# Patient Record
Sex: MALE | Race: WHITE | Employment: FULL TIME | URBAN - METROPOLITAN AREA
[De-identification: names, ages, dates, MRNs, and addresses within clinical notes are randomized per-mention and may not be internally consistent; named-entity substitution may affect disease eponyms.]

---

## 2017-01-06 ENCOUNTER — HOSPITAL ENCOUNTER (OUTPATIENT)
Dept: CV DIAGNOSTICS | Facility: HOSPITAL | Age: 52
Discharge: HOME | End: 2017-01-06
Attending: INTERNAL MEDICINE
Payer: COMMERCIAL

## 2017-01-06 DIAGNOSIS — R07.2 PRECORDIAL CHEST PAIN: ICD-10-CM

## 2017-01-06 DIAGNOSIS — I71.2 ASCENDING AORTIC ANEURYSM (HCC): ICD-10-CM

## 2017-01-06 PROCEDURE — 93306 TTE W/DOPPLER COMPLETE: CPT

## 2017-01-06 PROCEDURE — 93306 TTE W/DOPPLER COMPLETE: CPT | Performed by: INTERNAL MEDICINE

## 2017-01-27 ENCOUNTER — TELEPHONE (OUTPATIENT)
Dept: FAMILY MEDICINE CLINIC | Facility: CLINIC | Age: 52
End: 2017-01-27

## 2017-01-27 DIAGNOSIS — E78.5 HYPERLIPIDEMIA, UNSPECIFIED HYPERLIPIDEMIA TYPE: ICD-10-CM

## 2017-01-27 DIAGNOSIS — E10.8 TYPE I DIABETES MELLITUS WITH MANIFESTATIONS (HCC): ICD-10-CM

## 2017-01-27 DIAGNOSIS — Z13.29 SCREENING FOR THYROID DISORDER: ICD-10-CM

## 2017-01-27 DIAGNOSIS — Z13.0 SCREENING FOR DEFICIENCY ANEMIA: Primary | ICD-10-CM

## 2017-01-27 NOTE — TELEPHONE ENCOUNTER
patient asking for lab orders. has labs done every 4 months. please call when in the system      Please see attached message   Patient last had labs done October 2016

## 2017-01-31 ENCOUNTER — LAB ENCOUNTER (OUTPATIENT)
Dept: LAB | Age: 52
End: 2017-01-31
Attending: FAMILY MEDICINE
Payer: COMMERCIAL

## 2017-01-31 DIAGNOSIS — E78.5 HYPERLIPIDEMIA, UNSPECIFIED HYPERLIPIDEMIA TYPE: ICD-10-CM

## 2017-01-31 DIAGNOSIS — E10.8 TYPE I DIABETES MELLITUS WITH MANIFESTATIONS (HCC): ICD-10-CM

## 2017-01-31 DIAGNOSIS — Z13.29 SCREENING FOR THYROID DISORDER: ICD-10-CM

## 2017-01-31 DIAGNOSIS — Z13.0 SCREENING FOR DEFICIENCY ANEMIA: ICD-10-CM

## 2017-01-31 LAB
ALBUMIN SERPL-MCNC: 4.5 G/DL (ref 3.5–4.8)
ALP LIVER SERPL-CCNC: 66 U/L (ref 45–117)
ALT SERPL-CCNC: 52 U/L (ref 17–63)
AST SERPL-CCNC: 24 U/L (ref 15–41)
BASOPHILS # BLD AUTO: 0.07 X10(3) UL (ref 0–0.1)
BASOPHILS NFR BLD AUTO: 1.1 %
BILIRUB SERPL-MCNC: 0.8 MG/DL (ref 0.1–2)
BUN BLD-MCNC: 18 MG/DL (ref 8–20)
CALCIUM BLD-MCNC: 9.6 MG/DL (ref 8.3–10.3)
CHLORIDE: 104 MMOL/L (ref 101–111)
CHOLEST SMN-MCNC: 188 MG/DL (ref ?–200)
CO2: 30 MMOL/L (ref 22–32)
CREAT BLD-MCNC: 1.1 MG/DL (ref 0.7–1.3)
EOSINOPHIL # BLD AUTO: 0.28 X10(3) UL (ref 0–0.3)
EOSINOPHIL NFR BLD AUTO: 4.3 %
ERYTHROCYTE [DISTWIDTH] IN BLOOD BY AUTOMATED COUNT: 13 % (ref 11.5–16)
EST. AVERAGE GLUCOSE BLD GHB EST-MCNC: 174 MG/DL (ref 68–126)
GLUCOSE BLD-MCNC: 92 MG/DL (ref 70–99)
HBA1C MFR BLD HPLC: 7.7 % (ref ?–5.7)
HCT VFR BLD AUTO: 46.2 % (ref 37–53)
HDLC SERPL-MCNC: 56 MG/DL (ref 45–?)
HDLC SERPL: 3.36 {RATIO} (ref ?–4.97)
HGB BLD-MCNC: 15.5 G/DL (ref 13–17)
IMMATURE GRANULOCYTE COUNT: 0.01 X10(3) UL (ref 0–1)
IMMATURE GRANULOCYTE RATIO %: 0.2 %
LDLC SERPL CALC-MCNC: 115 MG/DL (ref ?–130)
LYMPHOCYTES # BLD AUTO: 2.03 X10(3) UL (ref 0.9–4)
LYMPHOCYTES NFR BLD AUTO: 31 %
M PROTEIN MFR SERPL ELPH: 7.8 G/DL (ref 6.1–8.3)
MCH RBC QN AUTO: 30.8 PG (ref 27–33.2)
MCHC RBC AUTO-ENTMCNC: 33.5 G/DL (ref 31–37)
MCV RBC AUTO: 91.7 FL (ref 80–99)
MONOCYTES # BLD AUTO: 0.51 X10(3) UL (ref 0.1–0.6)
MONOCYTES NFR BLD AUTO: 7.8 %
NEUTROPHIL ABS PRELIM: 3.65 X10 (3) UL (ref 1.3–6.7)
NEUTROPHILS # BLD AUTO: 3.65 X10(3) UL (ref 1.3–6.7)
NEUTROPHILS NFR BLD AUTO: 55.6 %
NONHDLC SERPL-MCNC: 132 MG/DL (ref ?–130)
PLATELET # BLD AUTO: 281 10(3)UL (ref 150–450)
POTASSIUM SERPL-SCNC: 4.1 MMOL/L (ref 3.6–5.1)
RBC # BLD AUTO: 5.04 X10(6)UL (ref 4.3–5.7)
RED CELL DISTRIBUTION WIDTH-SD: 43.2 FL (ref 35.1–46.3)
SODIUM SERPL-SCNC: 140 MMOL/L (ref 136–144)
TRIGLYCERIDES: 85 MG/DL (ref ?–150)
TSI SER-ACNC: 1.07 MIU/ML (ref 0.35–5.5)
VLDL: 17 MG/DL (ref 5–40)
WBC # BLD AUTO: 6.6 X10(3) UL (ref 4–13)

## 2017-01-31 PROCEDURE — 83036 HEMOGLOBIN GLYCOSYLATED A1C: CPT

## 2017-01-31 PROCEDURE — 36415 COLL VENOUS BLD VENIPUNCTURE: CPT

## 2017-01-31 PROCEDURE — 80061 LIPID PANEL: CPT

## 2017-01-31 PROCEDURE — 80053 COMPREHEN METABOLIC PANEL: CPT

## 2017-01-31 PROCEDURE — 84443 ASSAY THYROID STIM HORMONE: CPT

## 2017-01-31 PROCEDURE — 85025 COMPLETE CBC W/AUTO DIFF WBC: CPT

## 2017-02-03 ENCOUNTER — OFFICE VISIT (OUTPATIENT)
Dept: FAMILY MEDICINE CLINIC | Facility: CLINIC | Age: 52
End: 2017-02-03

## 2017-02-03 VITALS
TEMPERATURE: 97 F | HEART RATE: 91 BPM | SYSTOLIC BLOOD PRESSURE: 120 MMHG | WEIGHT: 190.19 LBS | OXYGEN SATURATION: 99 % | RESPIRATION RATE: 16 BRPM | BODY MASS INDEX: 26.33 KG/M2 | DIASTOLIC BLOOD PRESSURE: 78 MMHG | HEIGHT: 71.25 IN

## 2017-02-03 DIAGNOSIS — I77.810 THORACIC AORTIC ECTASIA (HCC): Primary | ICD-10-CM

## 2017-02-03 DIAGNOSIS — E29.1 HYPOGONADISM IN MALE: ICD-10-CM

## 2017-02-03 DIAGNOSIS — E10.8 TYPE 1 DIABETES MELLITUS WITH COMPLICATION (HCC): ICD-10-CM

## 2017-02-03 PROCEDURE — 99213 OFFICE O/P EST LOW 20 MIN: CPT | Performed by: FAMILY MEDICINE

## 2017-02-03 NOTE — PROGRESS NOTES
Here for 2 reasons one is to follow his chronic light nearly lifelong type 1 diabetes. Most recent A1c is 7.7. Second reason he is here is because of evolving sensation in his substernal area especially when lying backwards abruptly.   There is no cough

## 2017-02-06 ENCOUNTER — APPOINTMENT (OUTPATIENT)
Dept: LAB | Age: 52
End: 2017-02-06
Attending: FAMILY MEDICINE
Payer: COMMERCIAL

## 2017-02-06 DIAGNOSIS — E29.1 HYPOGONADISM IN MALE: ICD-10-CM

## 2017-02-06 LAB
FSH: 7.3 MIU/ML (ref 1.4–18.1)
LH: 4.1 MIU/ML (ref 1.5–9.3)
PROLACTIN: 7.8 NG/ML (ref 2.1–17.7)

## 2017-02-06 PROCEDURE — 84146 ASSAY OF PROLACTIN: CPT

## 2017-02-06 PROCEDURE — 36415 COLL VENOUS BLD VENIPUNCTURE: CPT

## 2017-02-06 PROCEDURE — 84403 ASSAY OF TOTAL TESTOSTERONE: CPT

## 2017-02-06 PROCEDURE — 83002 ASSAY OF GONADOTROPIN (LH): CPT

## 2017-02-06 PROCEDURE — 84402 ASSAY OF FREE TESTOSTERONE: CPT

## 2017-02-06 PROCEDURE — 83001 ASSAY OF GONADOTROPIN (FSH): CPT

## 2017-02-09 LAB
TESTOSTERONE TOTAL: 461 NG/DL
TESTOSTERONE, FREE -MS/MS: 95.9 PG/ML

## 2017-03-22 RX ORDER — INSULIN DETEMIR 100 [IU]/ML
INJECTION, SOLUTION SUBCUTANEOUS
Qty: 45 ML | Refills: 0 | Status: SHIPPED | OUTPATIENT
Start: 2017-03-22 | End: 2017-06-08

## 2017-05-31 RX ORDER — BLOOD SUGAR DIAGNOSTIC
STRIP MISCELLANEOUS
Qty: 100 STRIP | Refills: 0 | Status: SHIPPED | OUTPATIENT
Start: 2017-05-31 | End: 2017-06-08

## 2017-05-31 RX ORDER — PEN NEEDLE, DIABETIC 31 GX5/16"
NEEDLE, DISPOSABLE MISCELLANEOUS
Qty: 100 EACH | Refills: 3 | Status: SHIPPED | OUTPATIENT
Start: 2017-05-31 | End: 2018-02-06

## 2017-06-01 ENCOUNTER — LAB ENCOUNTER (OUTPATIENT)
Dept: LAB | Age: 52
End: 2017-06-01
Attending: FAMILY MEDICINE
Payer: COMMERCIAL

## 2017-06-01 ENCOUNTER — TELEPHONE (OUTPATIENT)
Dept: FAMILY MEDICINE CLINIC | Facility: CLINIC | Age: 52
End: 2017-06-01

## 2017-06-01 DIAGNOSIS — Z13.0 SCREENING FOR ENDOCRINE, NUTRITIONAL, METABOLIC AND IMMUNITY DISORDER: ICD-10-CM

## 2017-06-01 DIAGNOSIS — Z13.228 SCREENING FOR ENDOCRINE, NUTRITIONAL, METABOLIC AND IMMUNITY DISORDER: ICD-10-CM

## 2017-06-01 DIAGNOSIS — Z13.29 SCREENING FOR ENDOCRINE, NUTRITIONAL, METABOLIC AND IMMUNITY DISORDER: Primary | ICD-10-CM

## 2017-06-01 DIAGNOSIS — Z13.21 SCREENING FOR ENDOCRINE, NUTRITIONAL, METABOLIC AND IMMUNITY DISORDER: Primary | ICD-10-CM

## 2017-06-01 DIAGNOSIS — Z13.21 SCREENING FOR ENDOCRINE, NUTRITIONAL, METABOLIC AND IMMUNITY DISORDER: ICD-10-CM

## 2017-06-01 DIAGNOSIS — Z13.0 SCREENING FOR ENDOCRINE, NUTRITIONAL, METABOLIC AND IMMUNITY DISORDER: Primary | ICD-10-CM

## 2017-06-01 DIAGNOSIS — Z13.29 SCREENING FOR ENDOCRINE, NUTRITIONAL, METABOLIC AND IMMUNITY DISORDER: ICD-10-CM

## 2017-06-01 DIAGNOSIS — Z13.228 SCREENING FOR ENDOCRINE, NUTRITIONAL, METABOLIC AND IMMUNITY DISORDER: Primary | ICD-10-CM

## 2017-06-01 PROCEDURE — 83036 HEMOGLOBIN GLYCOSYLATED A1C: CPT

## 2017-06-01 PROCEDURE — 36415 COLL VENOUS BLD VENIPUNCTURE: CPT

## 2017-06-01 PROCEDURE — 80053 COMPREHEN METABOLIC PANEL: CPT

## 2017-06-01 PROCEDURE — 84443 ASSAY THYROID STIM HORMONE: CPT

## 2017-06-01 PROCEDURE — 82570 ASSAY OF URINE CREATININE: CPT

## 2017-06-01 PROCEDURE — 85025 COMPLETE CBC W/AUTO DIFF WBC: CPT

## 2017-06-01 PROCEDURE — 80061 LIPID PANEL: CPT

## 2017-06-01 PROCEDURE — 82043 UR ALBUMIN QUANTITATIVE: CPT

## 2017-06-01 NOTE — TELEPHONE ENCOUNTER
Lab called - can't release labs from 2/3/17 and pt there to have drawn. New labs re-entered. Task complete.

## 2017-06-02 ENCOUNTER — OFFICE VISIT (OUTPATIENT)
Dept: FAMILY MEDICINE CLINIC | Facility: CLINIC | Age: 52
End: 2017-06-02

## 2017-06-02 VITALS
BODY MASS INDEX: 26 KG/M2 | WEIGHT: 190 LBS | DIASTOLIC BLOOD PRESSURE: 88 MMHG | SYSTOLIC BLOOD PRESSURE: 126 MMHG | HEART RATE: 70 BPM | TEMPERATURE: 98 F | RESPIRATION RATE: 18 BRPM

## 2017-06-02 DIAGNOSIS — E10.8 TYPE 1 DIABETES MELLITUS WITH COMPLICATION (HCC): ICD-10-CM

## 2017-06-02 DIAGNOSIS — E10.9 TYPE 1 DIABETES MELLITUS WITHOUT COMPLICATION (HCC): Primary | ICD-10-CM

## 2017-06-02 PROCEDURE — 99213 OFFICE O/P EST LOW 20 MIN: CPT | Performed by: FAMILY MEDICINE

## 2017-06-02 NOTE — PROGRESS NOTES
Patient well-known to my practice here for reevaluation of his type 1 diabetes. He admittedly has put on 10 pounds over the past year from over eating and his blood sugars are starting to climb so his most recent A1c is 8.7.   The rest of the review of loren

## 2017-06-08 ENCOUNTER — TELEPHONE (OUTPATIENT)
Dept: FAMILY MEDICINE CLINIC | Facility: CLINIC | Age: 52
End: 2017-06-08

## 2017-06-08 RX ORDER — METFORMIN HYDROCHLORIDE 500 MG/1
1000 TABLET, EXTENDED RELEASE ORAL 2 TIMES DAILY WITH MEALS
Qty: 360 TABLET | Refills: 0 | Status: SHIPPED | OUTPATIENT
Start: 2017-06-08 | End: 2017-09-11

## 2017-06-09 RX ORDER — BLOOD SUGAR DIAGNOSTIC
STRIP MISCELLANEOUS
Qty: 100 STRIP | Refills: 0 | Status: SHIPPED | OUTPATIENT
Start: 2017-06-09 | End: 2017-07-06

## 2017-06-09 RX ORDER — INSULIN ASPART 100 [IU]/ML
INJECTION, SOLUTION INTRAVENOUS; SUBCUTANEOUS
Qty: 45 ML | Refills: 0 | Status: SHIPPED | OUTPATIENT
Start: 2017-06-09 | End: 2017-06-14

## 2017-06-09 RX ORDER — INSULIN DETEMIR 100 [IU]/ML
INJECTION, SOLUTION SUBCUTANEOUS
Qty: 45 ML | Refills: 0 | Status: SHIPPED | OUTPATIENT
Start: 2017-06-09 | End: 2017-11-10

## 2017-06-13 ENCOUNTER — TELEPHONE (OUTPATIENT)
Dept: FAMILY MEDICINE CLINIC | Facility: CLINIC | Age: 52
End: 2017-06-13

## 2017-06-14 ENCOUNTER — TELEPHONE (OUTPATIENT)
Dept: FAMILY MEDICINE CLINIC | Facility: CLINIC | Age: 52
End: 2017-06-14

## 2017-06-15 ENCOUNTER — TELEPHONE (OUTPATIENT)
Dept: FAMILY MEDICINE CLINIC | Facility: CLINIC | Age: 52
End: 2017-06-15

## 2017-06-15 NOTE — TELEPHONE ENCOUNTER
Patient calling about a refill of Novolog flexpen. pharmacy had Humilog. did doctor change to the humilog?

## 2017-07-06 RX ORDER — BLOOD SUGAR DIAGNOSTIC
STRIP MISCELLANEOUS
Qty: 100 STRIP | Refills: 0 | Status: SHIPPED | OUTPATIENT
Start: 2017-07-06 | End: 2017-08-01

## 2017-08-01 RX ORDER — BLOOD SUGAR DIAGNOSTIC
STRIP MISCELLANEOUS
Qty: 100 STRIP | Refills: 0 | Status: SHIPPED | OUTPATIENT
Start: 2017-08-01 | End: 2020-02-07 | Stop reason: ALTCHOICE

## 2017-09-12 RX ORDER — METFORMIN HYDROCHLORIDE 500 MG/1
TABLET, EXTENDED RELEASE ORAL
Qty: 360 TABLET | Refills: 0 | Status: SHIPPED | OUTPATIENT
Start: 2017-09-12 | End: 2017-12-09

## 2017-09-12 RX ORDER — INSULIN LISPRO 100 [IU]/ML
INJECTION, SOLUTION INTRAVENOUS; SUBCUTANEOUS
Qty: 45 ML | Refills: 0 | Status: SHIPPED | OUTPATIENT
Start: 2017-09-12 | End: 2020-02-07

## 2017-09-25 ENCOUNTER — MED REC SCAN ONLY (OUTPATIENT)
Dept: FAMILY MEDICINE CLINIC | Facility: CLINIC | Age: 52
End: 2017-09-25

## 2017-10-18 ENCOUNTER — TELEPHONE (OUTPATIENT)
Dept: FAMILY MEDICINE CLINIC | Facility: CLINIC | Age: 52
End: 2017-10-18

## 2017-10-18 DIAGNOSIS — E10.8 CONTROLLED DIABETES MELLITUS TYPE 1 WITH COMPLICATIONS (HCC): ICD-10-CM

## 2017-10-18 DIAGNOSIS — E78.5 HYPERLIPIDEMIA WITH TARGET LDL LESS THAN 100: Primary | ICD-10-CM

## 2017-10-18 NOTE — TELEPHONE ENCOUNTER
Pt calling to see if orders are in yet. He was told we are waiting for a response from the doctor.  He is in with patients

## 2017-10-24 ENCOUNTER — LAB ENCOUNTER (OUTPATIENT)
Dept: LAB | Age: 52
End: 2017-10-24
Attending: FAMILY MEDICINE
Payer: COMMERCIAL

## 2017-10-24 DIAGNOSIS — E10.8 CONTROLLED DIABETES MELLITUS TYPE 1 WITH COMPLICATIONS (HCC): ICD-10-CM

## 2017-10-24 DIAGNOSIS — E78.5 HYPERLIPIDEMIA WITH TARGET LDL LESS THAN 100: ICD-10-CM

## 2017-10-24 PROCEDURE — 36415 COLL VENOUS BLD VENIPUNCTURE: CPT

## 2017-10-24 PROCEDURE — 84443 ASSAY THYROID STIM HORMONE: CPT

## 2017-10-24 PROCEDURE — 80061 LIPID PANEL: CPT

## 2017-10-24 PROCEDURE — 85025 COMPLETE CBC W/AUTO DIFF WBC: CPT

## 2017-10-24 PROCEDURE — 83036 HEMOGLOBIN GLYCOSYLATED A1C: CPT

## 2017-10-24 PROCEDURE — 80053 COMPREHEN METABOLIC PANEL: CPT

## 2017-10-26 ENCOUNTER — TELEPHONE (OUTPATIENT)
Dept: SURGERY | Facility: CLINIC | Age: 52
End: 2017-10-26

## 2017-10-26 DIAGNOSIS — Z86.010 HX OF COLONIC POLYPS: Primary | ICD-10-CM

## 2017-10-27 RX ORDER — POLYETHYLENE GLYCOL 3350, SODIUM CHLORIDE, SODIUM BICARBONATE, POTASSIUM CHLORIDE 420; 11.2; 5.72; 1.48 G/4L; G/4L; G/4L; G/4L
POWDER, FOR SOLUTION ORAL
Qty: 1 BOTTLE | Refills: 0 | Status: SHIPPED | OUTPATIENT
Start: 2017-10-27 | End: 2017-11-08

## 2017-11-08 ENCOUNTER — OFFICE VISIT (OUTPATIENT)
Dept: FAMILY MEDICINE CLINIC | Facility: CLINIC | Age: 52
End: 2017-11-08

## 2017-11-08 VITALS
BODY MASS INDEX: 26.48 KG/M2 | TEMPERATURE: 99 F | SYSTOLIC BLOOD PRESSURE: 126 MMHG | HEIGHT: 70 IN | RESPIRATION RATE: 16 BRPM | HEART RATE: 92 BPM | DIASTOLIC BLOOD PRESSURE: 84 MMHG | WEIGHT: 185 LBS

## 2017-11-08 DIAGNOSIS — I77.819 AORTIC ECTASIA (HCC): Primary | ICD-10-CM

## 2017-11-08 DIAGNOSIS — E10.59 TYPE 1 DIABETES MELLITUS WITH OTHER CIRCULATORY COMPLICATION (HCC): ICD-10-CM

## 2017-11-08 PROCEDURE — 99213 OFFICE O/P EST LOW 20 MIN: CPT | Performed by: FAMILY MEDICINE

## 2017-11-08 NOTE — PROGRESS NOTES
Aortic ectasia. Aortic ectasia. The plan he is on a statin he is on aspirinPatient presents for his type I diabetic update. He is a hard-working gentleman travels a great deal is familiar with his medications and their usages.   He uses To help monitor

## 2017-11-09 ENCOUNTER — NURSE ONLY (OUTPATIENT)
Dept: FAMILY MEDICINE CLINIC | Facility: CLINIC | Age: 52
End: 2017-11-09

## 2017-11-09 DIAGNOSIS — Z23 NEED FOR VACCINATION: ICD-10-CM

## 2017-11-09 PROCEDURE — 90471 IMMUNIZATION ADMIN: CPT | Performed by: FAMILY MEDICINE

## 2017-11-09 PROCEDURE — 90686 IIV4 VACC NO PRSV 0.5 ML IM: CPT | Performed by: FAMILY MEDICINE

## 2017-11-10 RX ORDER — INSULIN DETEMIR 100 [IU]/ML
INJECTION, SOLUTION SUBCUTANEOUS
Qty: 45 ML | Refills: 0 | Status: SHIPPED | OUTPATIENT
Start: 2017-11-10 | End: 2017-12-26

## 2017-11-29 ENCOUNTER — TELEPHONE (OUTPATIENT)
Dept: SURGERY | Facility: CLINIC | Age: 52
End: 2017-11-29

## 2017-11-29 DIAGNOSIS — Z86.010 HX OF COLONIC POLYPS: Primary | ICD-10-CM

## 2017-12-11 RX ORDER — INSULIN LISPRO 100 [IU]/ML
INJECTION, SOLUTION INTRAVENOUS; SUBCUTANEOUS
Qty: 45 ML | Refills: 0 | Status: SHIPPED | OUTPATIENT
Start: 2017-12-11 | End: 2018-02-13

## 2017-12-11 RX ORDER — METFORMIN HYDROCHLORIDE 500 MG/1
TABLET, EXTENDED RELEASE ORAL
Qty: 360 TABLET | Refills: 0 | Status: SHIPPED | OUTPATIENT
Start: 2017-12-11 | End: 2017-12-14 | Stop reason: DRUGHIGH

## 2017-12-18 ENCOUNTER — ANESTHESIA EVENT (OUTPATIENT)
Dept: ENDOSCOPY | Facility: HOSPITAL | Age: 52
End: 2017-12-18

## 2017-12-18 NOTE — OR PREOP
Chart reviewed by , anesthesia for abnormal EKG. Order received to obtain cardiac clearance. I faxed to and spoke with Chalo Anthony in 's office regarding need for cardiac clearance. I faxed FYI to surgeon. Fax confirmations received.  I left mess

## 2017-12-26 RX ORDER — INSULIN DETEMIR 100 [IU]/ML
INJECTION, SOLUTION SUBCUTANEOUS
Qty: 45 ML | Refills: 0 | Status: SHIPPED | OUTPATIENT
Start: 2017-12-26 | End: 2018-10-29

## 2018-01-08 ENCOUNTER — ANESTHESIA (OUTPATIENT)
Dept: ENDOSCOPY | Facility: HOSPITAL | Age: 53
End: 2018-01-08

## 2018-01-08 ENCOUNTER — HOSPITAL ENCOUNTER (OUTPATIENT)
Facility: HOSPITAL | Age: 53
Setting detail: HOSPITAL OUTPATIENT SURGERY
Discharge: HOME | End: 2018-01-08
Attending: SURGERY | Admitting: SURGERY
Payer: COMMERCIAL

## 2018-01-08 ENCOUNTER — SURGERY (OUTPATIENT)
Age: 53
End: 2018-01-08

## 2018-01-08 VITALS
SYSTOLIC BLOOD PRESSURE: 142 MMHG | WEIGHT: 184.25 LBS | BODY MASS INDEX: 26.38 KG/M2 | HEART RATE: 59 BPM | TEMPERATURE: 98 F | HEIGHT: 70 IN | DIASTOLIC BLOOD PRESSURE: 78 MMHG | RESPIRATION RATE: 16 BRPM | OXYGEN SATURATION: 99 %

## 2018-01-08 DIAGNOSIS — Z86.010 HX OF COLONIC POLYPS: ICD-10-CM

## 2018-01-08 LAB — GLUCOSE BLD-MCNC: 193 MG/DL (ref 65–99)

## 2018-01-08 PROCEDURE — 45385 COLONOSCOPY W/LESION REMOVAL: CPT | Performed by: SURGERY

## 2018-01-08 PROCEDURE — 0DBL8ZX EXCISION OF TRANSVERSE COLON, VIA NATURAL OR ARTIFICIAL OPENING ENDOSCOPIC, DIAGNOSTIC: ICD-10-PCS | Performed by: SURGERY

## 2018-01-08 RX ORDER — DEXTROSE MONOHYDRATE 25 G/50ML
50 INJECTION, SOLUTION INTRAVENOUS
Status: DISCONTINUED | OUTPATIENT
Start: 2018-01-08 | End: 2018-01-08

## 2018-01-08 RX ORDER — SODIUM CHLORIDE, SODIUM LACTATE, POTASSIUM CHLORIDE, CALCIUM CHLORIDE 600; 310; 30; 20 MG/100ML; MG/100ML; MG/100ML; MG/100ML
INJECTION, SOLUTION INTRAVENOUS CONTINUOUS
Status: DISCONTINUED | OUTPATIENT
Start: 2018-01-08 | End: 2018-01-08

## 2018-01-08 RX ORDER — NALOXONE HYDROCHLORIDE 0.4 MG/ML
80 INJECTION, SOLUTION INTRAMUSCULAR; INTRAVENOUS; SUBCUTANEOUS AS NEEDED
Status: CANCELLED | OUTPATIENT
Start: 2018-01-08 | End: 2018-01-08

## 2018-01-08 RX ORDER — SODIUM CHLORIDE, SODIUM LACTATE, POTASSIUM CHLORIDE, CALCIUM CHLORIDE 600; 310; 30; 20 MG/100ML; MG/100ML; MG/100ML; MG/100ML
INJECTION, SOLUTION INTRAVENOUS CONTINUOUS
Status: CANCELLED | OUTPATIENT
Start: 2018-01-08

## 2018-01-08 RX ORDER — DEXTROSE MONOHYDRATE 25 G/50ML
50 INJECTION, SOLUTION INTRAVENOUS
Status: CANCELLED | OUTPATIENT
Start: 2018-01-08

## 2018-01-08 RX ORDER — ONDANSETRON 2 MG/ML
4 INJECTION INTRAMUSCULAR; INTRAVENOUS AS NEEDED
Status: CANCELLED | OUTPATIENT
Start: 2018-01-08 | End: 2018-01-08

## 2018-01-08 NOTE — ANESTHESIA PREPROCEDURE EVALUATION
PRE-OP EVALUATION    Patient Name: Leatha Peres    Pre-op Diagnosis: Hx of colonic polyps [Z86.010]    Procedure(s):  COLONOSCOPY    Surgeon(s) and Role:     * Mulugeta Cody MD - Primary    Pre-op vitals reviewed.         Body mass index is 26.44 kg/ GI/Hepatic/Renal                                 Cardiovascular  Comment: 1. Left ventricle: The cavity size was normal. Wall thickness was normal.     Systolic function was normal. The estimated ejection fraction was 55-60%.      There was no diagnostic ev oz/week     Comment: SOCIAL       Drug use: No     Available pre-op labs reviewed.     Lab Results  Component Value Date   WBC 6.5 10/24/2017   RBC 5.28 10/24/2017   HGB 16.2 10/24/2017   HCT 47.1 10/24/2017   MCV 89.2 10/24/2017   MCH 30.7 10/24/2017   MCH

## 2018-01-08 NOTE — OPERATIVE REPORT
COLONOSCOPY REPORT      PREOPERATIVE DIAGNOSIS:  History of colon polyp  POSTOPERATIVE DIAGNOSIS:  Colon polyp at hepatic flexure; internal hemorrhoids  PROCEDURE PERFORMED: Colonoscopy  with collection of specimen with cold forceps.     ASSISTANT: OR staff abnormalities. PROCEDURE: The patient is brought to the endoscopy suite and after vital signs are obtained the patient is placed in the lateral decubitus position.  Digital rectal examination is performed, which reveals normal rectal tone, no masses, and

## 2018-01-08 NOTE — ANESTHESIA POSTPROCEDURE EVALUATION
[address] Patient Status:  Hospital Outpatient Surgery   Age/Gender 47year old male MRN [id number]   Location [facility]. Attending Juan José Ortez, Kelly Rey MD   Hosp Day # 0 PCP Malini Tijerina DO       Anesthesia Post-op

## 2018-01-08 NOTE — H&P
[facility]  Progress Note    Delphine Leandrokirstin Patient Status:  Hospital Outpatient Surgery    1965 MRN [id number]   [facility] ENDOSCOPY Attending Nixon Palomo MD   Hosp Day # 0 PCP Leon Sorenson DO     Subjective:   The pat 1, controlled ([facility] [address]     Thoracic aortic ectasia (HCC)     Ascending aortic aneurysm [facility])     Cardiovascular function study, abnormal     Encounter for screening colonoscopy     Pre-operative cardiovascular examination     Hyperlipidemia with target LDL le

## 2018-01-10 NOTE — PROGRESS NOTES
Normal results. Notify patient and forward copy of results. Needs repeat colonoscopy in three years. Please inform.   PP

## 2018-01-31 ENCOUNTER — APPOINTMENT (OUTPATIENT)
Dept: LAB | Age: 53
End: 2018-01-31
Attending: FAMILY MEDICINE
Payer: COMMERCIAL

## 2018-01-31 DIAGNOSIS — I77.819 AORTIC ECTASIA (HCC): ICD-10-CM

## 2018-01-31 LAB
25-HYDROXYVITAMIN D (TOTAL): 32.2 NG/ML (ref 30–100)
ALBUMIN SERPL-MCNC: 4.4 G/DL (ref 3.5–4.8)
ALP LIVER SERPL-CCNC: 75 U/L (ref 45–117)
ALT SERPL-CCNC: 42 U/L (ref 17–63)
AST SERPL-CCNC: 19 U/L (ref 15–41)
BILIRUB SERPL-MCNC: 0.6 MG/DL (ref 0.1–2)
BUN BLD-MCNC: 22 MG/DL (ref 8–20)
CALCIUM BLD-MCNC: 9 MG/DL (ref 8.3–10.3)
CHLORIDE: 103 MMOL/L (ref 101–111)
CHOLEST SMN-MCNC: 180 MG/DL (ref ?–200)
CO2: 27 MMOL/L (ref 22–32)
CREAT BLD-MCNC: 1.1 MG/DL (ref 0.7–1.3)
EST. AVERAGE GLUCOSE BLD GHB EST-MCNC: 174 MG/DL (ref 68–126)
GLUCOSE BLD-MCNC: 140 MG/DL (ref 70–99)
HBA1C MFR BLD HPLC: 7.7 % (ref ?–5.7)
HDLC SERPL-MCNC: 41 MG/DL (ref 45–?)
HDLC SERPL: 4.39 {RATIO} (ref ?–4.97)
LDLC SERPL CALC-MCNC: 112 MG/DL (ref ?–130)
M PROTEIN MFR SERPL ELPH: 7.6 G/DL (ref 6.1–8.3)
NONHDLC SERPL-MCNC: 139 MG/DL (ref ?–130)
POTASSIUM SERPL-SCNC: 4 MMOL/L (ref 3.6–5.1)
SODIUM SERPL-SCNC: 138 MMOL/L (ref 136–144)
TRIGL SERPL-MCNC: 136 MG/DL (ref ?–150)
VLDLC SERPL CALC-MCNC: 27 MG/DL (ref 5–40)

## 2018-01-31 PROCEDURE — 36415 COLL VENOUS BLD VENIPUNCTURE: CPT | Performed by: FAMILY MEDICINE

## 2018-01-31 PROCEDURE — 82306 VITAMIN D 25 HYDROXY: CPT | Performed by: FAMILY MEDICINE

## 2018-01-31 PROCEDURE — 80053 COMPREHEN METABOLIC PANEL: CPT | Performed by: FAMILY MEDICINE

## 2018-01-31 PROCEDURE — 83036 HEMOGLOBIN GLYCOSYLATED A1C: CPT | Performed by: FAMILY MEDICINE

## 2018-01-31 PROCEDURE — 80061 LIPID PANEL: CPT | Performed by: FAMILY MEDICINE

## 2018-02-13 ENCOUNTER — OFFICE VISIT (OUTPATIENT)
Dept: FAMILY MEDICINE CLINIC | Facility: CLINIC | Age: 53
End: 2018-02-13

## 2018-02-13 ENCOUNTER — HOSPITAL ENCOUNTER (OUTPATIENT)
Dept: GENERAL RADIOLOGY | Age: 53
Discharge: HOME | End: 2018-02-13
Attending: FAMILY MEDICINE
Payer: COMMERCIAL

## 2018-02-13 VITALS
RESPIRATION RATE: 18 BRPM | SYSTOLIC BLOOD PRESSURE: 110 MMHG | BODY MASS INDEX: 26.48 KG/M2 | HEART RATE: 101 BPM | WEIGHT: 185 LBS | HEIGHT: 70 IN | TEMPERATURE: 97 F | DIASTOLIC BLOOD PRESSURE: 86 MMHG | OXYGEN SATURATION: 98 %

## 2018-02-13 DIAGNOSIS — M70.61 TROCHANTERIC BURSITIS OF BOTH HIPS: ICD-10-CM

## 2018-02-13 DIAGNOSIS — M70.62 TROCHANTERIC BURSITIS OF BOTH HIPS: ICD-10-CM

## 2018-02-13 DIAGNOSIS — E10.65 TYPE 1 DIABETES MELLITUS WITH HYPERGLYCEMIA (HCC): Primary | ICD-10-CM

## 2018-02-13 PROCEDURE — 99214 OFFICE O/P EST MOD 30 MIN: CPT | Performed by: FAMILY MEDICINE

## 2018-02-13 PROCEDURE — 73523 X-RAY EXAM HIPS BI 5/> VIEWS: CPT | Performed by: FAMILY MEDICINE

## 2018-02-13 RX ORDER — METFORMIN HYDROCHLORIDE 500 MG/1
1000 TABLET, EXTENDED RELEASE ORAL 2 TIMES DAILY WITH MEALS
COMMUNITY
End: 2019-08-16

## 2018-02-13 NOTE — PROGRESS NOTES
Assessment #1 trochanteric bursitis left hip    Assessment #2 type 1 diabetes uncontrolled    The plan introduce the Manfreduvia is told he was advised as part far as possible side effects.   We will see him back in 2 months I would like to see him in physi

## 2018-03-13 PROBLEM — M70.62 TROCHANTERIC BURSITIS, LEFT HIP: Status: ACTIVE | Noted: 2018-03-13

## 2018-06-21 ENCOUNTER — TELEPHONE (OUTPATIENT)
Dept: FAMILY MEDICINE CLINIC | Facility: CLINIC | Age: 53
End: 2018-06-21

## 2018-06-21 DIAGNOSIS — E10.9 TYPE 1 DIABETES MELLITUS NOT AT GOAL (HCC): ICD-10-CM

## 2018-06-21 DIAGNOSIS — E78.5 HYPERLIPIDEMIA, UNSPECIFIED HYPERLIPIDEMIA TYPE: Primary | ICD-10-CM

## 2018-07-13 ENCOUNTER — APPOINTMENT (OUTPATIENT)
Dept: LAB | Age: 53
End: 2018-07-13
Attending: FAMILY MEDICINE
Payer: COMMERCIAL

## 2018-07-13 DIAGNOSIS — E78.5 HYPERLIPIDEMIA, UNSPECIFIED HYPERLIPIDEMIA TYPE: ICD-10-CM

## 2018-07-13 DIAGNOSIS — E10.9 TYPE 1 DIABETES MELLITUS NOT AT GOAL (HCC): ICD-10-CM

## 2018-07-13 LAB
ALBUMIN SERPL-MCNC: 4 G/DL (ref 3.5–4.8)
ALP LIVER SERPL-CCNC: 62 U/L (ref 45–117)
ALT SERPL-CCNC: 35 U/L (ref 17–63)
AST SERPL-CCNC: 15 U/L (ref 15–41)
BILIRUB SERPL-MCNC: 0.6 MG/DL (ref 0.1–2)
BUN BLD-MCNC: 11 MG/DL (ref 8–20)
CALCIUM BLD-MCNC: 9.3 MG/DL (ref 8.3–10.3)
CHLORIDE: 108 MMOL/L (ref 101–111)
CHOLEST SMN-MCNC: 116 MG/DL (ref ?–200)
CO2: 27 MMOL/L (ref 22–32)
CREAT BLD-MCNC: 0.92 MG/DL (ref 0.7–1.3)
EST. AVERAGE GLUCOSE BLD GHB EST-MCNC: 154 MG/DL (ref 68–126)
GLUCOSE BLD-MCNC: 89 MG/DL (ref 70–99)
HBA1C MFR BLD HPLC: 7 % (ref ?–5.7)
HDLC SERPL-MCNC: 49 MG/DL (ref 45–?)
HDLC SERPL: 2.37 {RATIO} (ref ?–4.97)
LDLC SERPL CALC-MCNC: 58 MG/DL (ref ?–130)
M PROTEIN MFR SERPL ELPH: 7.1 G/DL (ref 6.1–8.3)
NONHDLC SERPL-MCNC: 67 MG/DL (ref ?–130)
POTASSIUM SERPL-SCNC: 4 MMOL/L (ref 3.6–5.1)
SODIUM SERPL-SCNC: 142 MMOL/L (ref 136–144)
TRIGL SERPL-MCNC: 43 MG/DL (ref ?–150)
VLDLC SERPL CALC-MCNC: 9 MG/DL (ref 5–40)

## 2018-07-13 PROCEDURE — 83036 HEMOGLOBIN GLYCOSYLATED A1C: CPT | Performed by: FAMILY MEDICINE

## 2018-07-13 PROCEDURE — 80053 COMPREHEN METABOLIC PANEL: CPT | Performed by: FAMILY MEDICINE

## 2018-07-13 PROCEDURE — 80061 LIPID PANEL: CPT | Performed by: FAMILY MEDICINE

## 2018-07-13 PROCEDURE — 36415 COLL VENOUS BLD VENIPUNCTURE: CPT | Performed by: FAMILY MEDICINE

## 2018-07-19 ENCOUNTER — OFFICE VISIT (OUTPATIENT)
Dept: FAMILY MEDICINE CLINIC | Facility: CLINIC | Age: 53
End: 2018-07-19
Payer: COMMERCIAL

## 2018-07-19 VITALS
SYSTOLIC BLOOD PRESSURE: 130 MMHG | OXYGEN SATURATION: 97 % | BODY MASS INDEX: 27 KG/M2 | RESPIRATION RATE: 16 BRPM | DIASTOLIC BLOOD PRESSURE: 80 MMHG | HEART RATE: 82 BPM | WEIGHT: 188 LBS

## 2018-07-19 DIAGNOSIS — Z12.5 ENCOUNTER FOR SCREENING FOR MALIGNANT NEOPLASM OF PROSTATE: ICD-10-CM

## 2018-07-19 DIAGNOSIS — E78.5 HYPERLIPIDEMIA WITH TARGET LDL LESS THAN 100: ICD-10-CM

## 2018-07-19 DIAGNOSIS — E10.65 UNCONTROLLED TYPE 1 DIABETES MELLITUS WITH COMPLICATION (HCC): Primary | ICD-10-CM

## 2018-07-19 DIAGNOSIS — Z13.29 SCREENING FOR THYROID DISORDER: ICD-10-CM

## 2018-07-19 DIAGNOSIS — E10.8 UNCONTROLLED TYPE 1 DIABETES MELLITUS WITH COMPLICATION (HCC): Primary | ICD-10-CM

## 2018-07-19 PROCEDURE — 99214 OFFICE O/P EST MOD 30 MIN: CPT | Performed by: FAMILY MEDICINE

## 2018-07-19 NOTE — PROGRESS NOTES
Type I diabetic here for recheck. Father just  this past year of the complications from poorly controlled diabetes.   This patient has been working very hard is witnessed by an A1c of 7.0 total cholesterol of 154 GFR of 95 triglycerides of 43 and LDL o

## 2018-10-02 ENCOUNTER — LAB ENCOUNTER (OUTPATIENT)
Dept: LAB | Age: 53
End: 2018-10-02
Attending: FAMILY MEDICINE
Payer: COMMERCIAL

## 2018-10-02 DIAGNOSIS — E78.5 HYPERLIPIDEMIA WITH TARGET LDL LESS THAN 100: ICD-10-CM

## 2018-10-02 DIAGNOSIS — E10.65 UNCONTROLLED TYPE 1 DIABETES MELLITUS WITH COMPLICATION (HCC): ICD-10-CM

## 2018-10-02 DIAGNOSIS — Z13.29 SCREENING FOR THYROID DISORDER: ICD-10-CM

## 2018-10-02 DIAGNOSIS — Z12.5 ENCOUNTER FOR SCREENING FOR MALIGNANT NEOPLASM OF PROSTATE: ICD-10-CM

## 2018-10-02 DIAGNOSIS — E10.8 UNCONTROLLED TYPE 1 DIABETES MELLITUS WITH COMPLICATION (HCC): ICD-10-CM

## 2018-10-02 PROCEDURE — 80061 LIPID PANEL: CPT | Performed by: FAMILY MEDICINE

## 2018-10-02 PROCEDURE — 84153 ASSAY OF PSA TOTAL: CPT | Performed by: FAMILY MEDICINE

## 2018-10-02 PROCEDURE — 36415 COLL VENOUS BLD VENIPUNCTURE: CPT | Performed by: FAMILY MEDICINE

## 2018-10-02 PROCEDURE — 82570 ASSAY OF URINE CREATININE: CPT | Performed by: FAMILY MEDICINE

## 2018-10-02 PROCEDURE — 80050 GENERAL HEALTH PANEL: CPT | Performed by: FAMILY MEDICINE

## 2018-10-02 PROCEDURE — 83036 HEMOGLOBIN GLYCOSYLATED A1C: CPT | Performed by: FAMILY MEDICINE

## 2018-10-02 PROCEDURE — 82043 UR ALBUMIN QUANTITATIVE: CPT | Performed by: FAMILY MEDICINE

## 2018-10-04 ENCOUNTER — OFFICE VISIT (OUTPATIENT)
Dept: FAMILY MEDICINE CLINIC | Facility: CLINIC | Age: 53
End: 2018-10-04
Payer: COMMERCIAL

## 2018-10-04 VITALS
SYSTOLIC BLOOD PRESSURE: 118 MMHG | WEIGHT: 186 LBS | BODY MASS INDEX: 26.63 KG/M2 | HEIGHT: 70 IN | OXYGEN SATURATION: 98 % | RESPIRATION RATE: 16 BRPM | DIASTOLIC BLOOD PRESSURE: 84 MMHG | HEART RATE: 82 BPM

## 2018-10-04 DIAGNOSIS — Z23 NEED FOR VACCINATION: ICD-10-CM

## 2018-10-04 PROCEDURE — 90471 IMMUNIZATION ADMIN: CPT | Performed by: FAMILY MEDICINE

## 2018-10-04 PROCEDURE — 99214 OFFICE O/P EST MOD 30 MIN: CPT | Performed by: FAMILY MEDICINE

## 2018-10-04 PROCEDURE — 90686 IIV4 VACC NO PRSV 0.5 ML IM: CPT | Performed by: FAMILY MEDICINE

## 2018-10-04 NOTE — PROGRESS NOTES
This is his routine visit to determine overall control of his type 1 diabetes.   I also took advantage of his appointment to redo his diabetic foot exam which was totally normal.Bilateral barefoot skin diabetic exam is normal, visualized feet and the appear

## 2018-10-19 ENCOUNTER — OFFICE VISIT (OUTPATIENT)
Dept: FAMILY MEDICINE CLINIC | Facility: CLINIC | Age: 53
End: 2018-10-19
Payer: COMMERCIAL

## 2018-10-19 VITALS
DIASTOLIC BLOOD PRESSURE: 86 MMHG | SYSTOLIC BLOOD PRESSURE: 116 MMHG | WEIGHT: 189 LBS | OXYGEN SATURATION: 98 % | HEART RATE: 86 BPM | RESPIRATION RATE: 16 BRPM | BODY MASS INDEX: 27 KG/M2

## 2018-10-19 DIAGNOSIS — E10.9 TYPE 1 DIABETES MELLITUS WITHOUT COMPLICATION (HCC): Primary | ICD-10-CM

## 2018-10-19 PROCEDURE — 99213 OFFICE O/P EST LOW 20 MIN: CPT | Performed by: FAMILY MEDICINE

## 2018-10-19 NOTE — PROGRESS NOTES
Here on my request to further run down the early experience with him using Januvia. He finds that it has been enhancing the effectiveness of his Januvia and not necessarily increasing his frequency of hypoglycemic events.   We spent a great deal of time in

## 2018-10-29 ENCOUNTER — HOSPITAL ENCOUNTER (OUTPATIENT)
Dept: CV DIAGNOSTICS | Facility: HOSPITAL | Age: 53
Discharge: HOME | End: 2018-10-29
Attending: INTERNAL MEDICINE
Payer: COMMERCIAL

## 2018-10-29 DIAGNOSIS — I71.2 ASCENDING AORTIC ANEURYSM (HCC): ICD-10-CM

## 2018-10-29 DIAGNOSIS — R07.2 PRECORDIAL CHEST PAIN: ICD-10-CM

## 2018-10-29 DIAGNOSIS — E78.5 HYPERLIPIDEMIA WITH TARGET LDL LESS THAN 100: ICD-10-CM

## 2018-10-29 PROCEDURE — 93306 TTE W/DOPPLER COMPLETE: CPT | Performed by: INTERNAL MEDICINE

## 2018-10-29 RX ORDER — INSULIN DETEMIR 100 [IU]/ML
INJECTION, SOLUTION SUBCUTANEOUS
Qty: 45 ML | Refills: 0 | Status: SHIPPED | OUTPATIENT
Start: 2018-10-29 | End: 2018-12-17

## 2018-11-13 ENCOUNTER — HOSPITAL ENCOUNTER (OUTPATIENT)
Dept: CT IMAGING | Facility: HOSPITAL | Age: 53
Discharge: HOME | End: 2018-11-13
Attending: INTERNAL MEDICINE
Payer: COMMERCIAL

## 2018-11-13 DIAGNOSIS — R07.2 PRECORDIAL CHEST PAIN: ICD-10-CM

## 2018-11-13 DIAGNOSIS — E78.5 HYPERLIPIDEMIA WITH TARGET LDL LESS THAN 100: ICD-10-CM

## 2018-11-13 DIAGNOSIS — I71.2 ASCENDING AORTIC ANEURYSM (HCC): ICD-10-CM

## 2018-11-13 PROCEDURE — 71275 CT ANGIOGRAPHY CHEST: CPT | Performed by: INTERNAL MEDICINE

## 2018-11-13 PROCEDURE — 82565 ASSAY OF CREATININE: CPT

## 2018-11-13 RX ORDER — NITROGLYCERIN 0.4 MG/1
TABLET SUBLINGUAL
Status: COMPLETED
Start: 2018-11-13 | End: 2018-11-13

## 2018-11-13 RX ADMIN — NITROGLYCERIN 0.4 MG: 0.4 TABLET SUBLINGUAL at 09:05:00

## 2018-12-17 RX ORDER — INSULIN DETEMIR 100 [IU]/ML
INJECTION, SOLUTION SUBCUTANEOUS
Qty: 45 ML | Refills: 3 | Status: SHIPPED | OUTPATIENT
Start: 2018-12-17 | End: 2019-10-04

## 2019-01-11 ENCOUNTER — OFFICE VISIT (OUTPATIENT)
Dept: FAMILY MEDICINE CLINIC | Facility: CLINIC | Age: 54
End: 2019-01-11
Payer: COMMERCIAL

## 2019-01-11 VITALS
WEIGHT: 186 LBS | TEMPERATURE: 98 F | HEART RATE: 86 BPM | DIASTOLIC BLOOD PRESSURE: 74 MMHG | SYSTOLIC BLOOD PRESSURE: 118 MMHG | HEIGHT: 70 IN | RESPIRATION RATE: 16 BRPM | BODY MASS INDEX: 26.63 KG/M2 | OXYGEN SATURATION: 98 %

## 2019-01-11 DIAGNOSIS — L91.8 SKIN TAG, ACQUIRED: Primary | ICD-10-CM

## 2019-01-11 PROBLEM — D22.9 NEVUS: Status: ACTIVE | Noted: 2019-01-11

## 2019-01-11 PROCEDURE — 88305 TISSUE EXAM BY PATHOLOGIST: CPT | Performed by: FAMILY MEDICINE

## 2019-01-11 PROCEDURE — 11302 SHAVE SKIN LESION 1.1-2.0 CM: CPT | Performed by: FAMILY MEDICINE

## 2019-01-11 NOTE — PROGRESS NOTES
Here for removal of 2 enlarging chronic lesions both are well demarcated flesh-colored both in the upper anterior chest wall.   The one on the left of midline is a mulberry nevus the one to the right is a simple flat skin tag    He is up-to-date on tetanus

## 2019-02-07 ENCOUNTER — PATIENT OUTREACH (OUTPATIENT)
Dept: FAMILY MEDICINE CLINIC | Facility: CLINIC | Age: 54
End: 2019-02-07

## 2019-03-15 RX ORDER — SITAGLIPTIN 100 MG/1
TABLET, FILM COATED ORAL
Qty: 90 TABLET | Refills: 0 | Status: SHIPPED | OUTPATIENT
Start: 2019-03-15 | End: 2019-08-20

## 2019-05-14 ENCOUNTER — OFFICE VISIT (OUTPATIENT)
Dept: FAMILY MEDICINE CLINIC | Facility: CLINIC | Age: 54
End: 2019-05-14
Payer: COMMERCIAL

## 2019-05-14 VITALS
SYSTOLIC BLOOD PRESSURE: 124 MMHG | HEART RATE: 83 BPM | BODY MASS INDEX: 26.92 KG/M2 | OXYGEN SATURATION: 98 % | HEIGHT: 70 IN | DIASTOLIC BLOOD PRESSURE: 80 MMHG | RESPIRATION RATE: 18 BRPM | WEIGHT: 188 LBS

## 2019-05-14 DIAGNOSIS — E10.9 TYPE 1 DIABETES MELLITUS WITHOUT COMPLICATION (HCC): Primary | ICD-10-CM

## 2019-05-14 PROCEDURE — 99213 OFFICE O/P EST LOW 20 MIN: CPT | Performed by: FAMILY MEDICINE

## 2019-05-14 RX ORDER — FLASH GLUCOSE SENSOR
1 KIT MISCELLANEOUS
Qty: 6 EACH | Refills: 3 | Status: SHIPPED | OUTPATIENT
Start: 2019-05-14 | End: 2019-11-26

## 2019-05-14 RX ORDER — FLASH GLUCOSE SCANNING READER
1 EACH MISCELLANEOUS
Qty: 1 DEVICE | Refills: 0 | Status: SHIPPED | OUTPATIENT
Start: 2019-05-14 | End: 2020-02-07 | Stop reason: ALTCHOICE

## 2019-05-14 NOTE — PROGRESS NOTES
Patient is here for discussion and management of his type 1 diabetes. He is still trying hard but the numbers are varying up and down. It seems most mostly related to his lifestyle habits.   It also seemed clear that he would like a vacation from his dise

## 2019-06-14 RX ORDER — METFORMIN HYDROCHLORIDE 500 MG/1
TABLET, EXTENDED RELEASE ORAL
Qty: 360 TABLET | Refills: 0 | Status: SHIPPED | OUTPATIENT
Start: 2019-06-14 | End: 2020-03-06

## 2019-08-09 ENCOUNTER — APPOINTMENT (OUTPATIENT)
Dept: LAB | Age: 54
End: 2019-08-09
Attending: FAMILY MEDICINE
Payer: COMMERCIAL

## 2019-08-09 ENCOUNTER — TELEPHONE (OUTPATIENT)
Dept: FAMILY MEDICINE CLINIC | Facility: CLINIC | Age: 54
End: 2019-08-09

## 2019-08-09 DIAGNOSIS — Z12.5 ENCOUNTER FOR SCREENING FOR MALIGNANT NEOPLASM OF PROSTATE: ICD-10-CM

## 2019-08-09 DIAGNOSIS — Z13.29 SCREENING FOR THYROID DISORDER: ICD-10-CM

## 2019-08-09 DIAGNOSIS — E78.5 HYPERLIPIDEMIA WITH TARGET LDL LESS THAN 100: ICD-10-CM

## 2019-08-09 DIAGNOSIS — E10.9 TYPE 1 DIABETES MELLITUS WITHOUT COMPLICATION (HCC): Primary | ICD-10-CM

## 2019-08-09 LAB
ALBUMIN SERPL-MCNC: 4.4 G/DL (ref 3.4–5)
ALBUMIN/GLOB SERPL: 1.3 {RATIO} (ref 1–2)
ALP LIVER SERPL-CCNC: 62 U/L (ref 45–117)
ALT SERPL-CCNC: 33 U/L (ref 16–61)
ANION GAP SERPL CALC-SCNC: 7 MMOL/L (ref 0–18)
AST SERPL-CCNC: 18 U/L (ref 15–37)
BASOPHILS # BLD AUTO: 0.04 X10(3) UL (ref 0–0.2)
BASOPHILS NFR BLD AUTO: 0.7 %
BILIRUB SERPL-MCNC: 1 MG/DL (ref 0.1–2)
BUN BLD-MCNC: 17 MG/DL (ref 7–18)
BUN/CREAT SERPL: 13.8 (ref 10–20)
CALCIUM BLD-MCNC: 9.4 MG/DL (ref 8.5–10.1)
CHLORIDE SERPL-SCNC: 105 MMOL/L (ref 98–112)
CHOLEST SMN-MCNC: 137 MG/DL (ref ?–200)
CO2 SERPL-SCNC: 26 MMOL/L (ref 21–32)
COMPLEXED PSA SERPL-MCNC: 0.94 NG/ML (ref ?–4)
CREAT BLD-MCNC: 1.23 MG/DL (ref 0.7–1.3)
CREAT UR-SCNC: 177 MG/DL
DEPRECATED RDW RBC AUTO: 44.7 FL (ref 35.1–46.3)
EOSINOPHIL # BLD AUTO: 0.13 X10(3) UL (ref 0–0.7)
EOSINOPHIL NFR BLD AUTO: 2.2 %
ERYTHROCYTE [DISTWIDTH] IN BLOOD BY AUTOMATED COUNT: 13.2 % (ref 11–15)
EST. AVERAGE GLUCOSE BLD GHB EST-MCNC: 169 MG/DL (ref 68–126)
GLOBULIN PLAS-MCNC: 3.4 G/DL (ref 2.8–4.4)
GLUCOSE BLD-MCNC: 151 MG/DL (ref 70–99)
HBA1C MFR BLD HPLC: 7.5 % (ref ?–5.7)
HCT VFR BLD AUTO: 49.7 % (ref 39–53)
HDLC SERPL-MCNC: 40 MG/DL (ref 40–59)
HGB BLD-MCNC: 16.5 G/DL (ref 13–17.5)
IMM GRANULOCYTES # BLD AUTO: 0.01 X10(3) UL (ref 0–1)
IMM GRANULOCYTES NFR BLD: 0.2 %
LDLC SERPL CALC-MCNC: 82 MG/DL (ref ?–100)
LYMPHOCYTES # BLD AUTO: 1.62 X10(3) UL (ref 1–4)
LYMPHOCYTES NFR BLD AUTO: 27.5 %
M PROTEIN MFR SERPL ELPH: 7.8 G/DL (ref 6.4–8.2)
MCH RBC QN AUTO: 30.6 PG (ref 26–34)
MCHC RBC AUTO-ENTMCNC: 33.2 G/DL (ref 31–37)
MCV RBC AUTO: 92 FL (ref 80–100)
MICROALBUMIN UR-MCNC: 4.89 MG/DL
MICROALBUMIN/CREAT 24H UR-RTO: 27.6 UG/MG (ref ?–30)
MONOCYTES # BLD AUTO: 0.51 X10(3) UL (ref 0.1–1)
MONOCYTES NFR BLD AUTO: 8.7 %
NEUTROPHILS # BLD AUTO: 3.58 X10 (3) UL (ref 1.5–7.7)
NEUTROPHILS # BLD AUTO: 3.58 X10(3) UL (ref 1.5–7.7)
NEUTROPHILS NFR BLD AUTO: 60.7 %
NONHDLC SERPL-MCNC: 97 MG/DL (ref ?–130)
OSMOLALITY SERPL CALC.SUM OF ELEC: 290 MOSM/KG (ref 275–295)
PLATELET # BLD AUTO: 296 10(3)UL (ref 150–450)
POTASSIUM SERPL-SCNC: 4.1 MMOL/L (ref 3.5–5.1)
RBC # BLD AUTO: 5.4 X10(6)UL (ref 4.3–5.7)
SODIUM SERPL-SCNC: 138 MMOL/L (ref 136–145)
TRIGL SERPL-MCNC: 73 MG/DL (ref 30–149)
TSI SER-ACNC: 0.85 MIU/ML (ref 0.36–3.74)
VLDLC SERPL CALC-MCNC: 15 MG/DL (ref 0–30)
WBC # BLD AUTO: 5.9 X10(3) UL (ref 4–11)

## 2019-08-09 PROCEDURE — 36415 COLL VENOUS BLD VENIPUNCTURE: CPT | Performed by: FAMILY MEDICINE

## 2019-08-09 PROCEDURE — 82043 UR ALBUMIN QUANTITATIVE: CPT | Performed by: FAMILY MEDICINE

## 2019-08-09 PROCEDURE — 85025 COMPLETE CBC W/AUTO DIFF WBC: CPT | Performed by: FAMILY MEDICINE

## 2019-08-09 PROCEDURE — 84443 ASSAY THYROID STIM HORMONE: CPT | Performed by: FAMILY MEDICINE

## 2019-08-09 PROCEDURE — 80061 LIPID PANEL: CPT | Performed by: FAMILY MEDICINE

## 2019-08-09 PROCEDURE — 82570 ASSAY OF URINE CREATININE: CPT | Performed by: FAMILY MEDICINE

## 2019-08-09 PROCEDURE — 80053 COMPREHEN METABOLIC PANEL: CPT | Performed by: FAMILY MEDICINE

## 2019-08-09 PROCEDURE — 83036 HEMOGLOBIN GLYCOSYLATED A1C: CPT | Performed by: FAMILY MEDICINE

## 2019-08-16 ENCOUNTER — OFFICE VISIT (OUTPATIENT)
Dept: FAMILY MEDICINE CLINIC | Facility: CLINIC | Age: 54
End: 2019-08-16
Payer: COMMERCIAL

## 2019-08-16 VITALS
WEIGHT: 187 LBS | RESPIRATION RATE: 16 BRPM | HEART RATE: 86 BPM | OXYGEN SATURATION: 98 % | HEIGHT: 70 IN | TEMPERATURE: 99 F | SYSTOLIC BLOOD PRESSURE: 132 MMHG | DIASTOLIC BLOOD PRESSURE: 86 MMHG | BODY MASS INDEX: 26.77 KG/M2

## 2019-08-16 DIAGNOSIS — E10.8 CONTROLLED DIABETES MELLITUS TYPE 1 WITH COMPLICATIONS (HCC): Primary | ICD-10-CM

## 2019-08-16 DIAGNOSIS — E78.5 HYPERLIPIDEMIA WITH TARGET LDL LESS THAN 100: ICD-10-CM

## 2019-08-16 PROCEDURE — 99213 OFFICE O/P EST LOW 20 MIN: CPT | Performed by: FAMILY MEDICINE

## 2019-08-16 NOTE — PROGRESS NOTES
Tabetic here for diabetic follow-up. Unfortunately and in spite of his hard work and the use of the transdermal system his A1c's have been disappointing. Most recent of which was 7.5. His fastings are generally around less than 150.   He has understand

## 2019-08-20 RX ORDER — SITAGLIPTIN 100 MG/1
TABLET, FILM COATED ORAL
Qty: 90 TABLET | Refills: 0 | Status: SHIPPED | OUTPATIENT
Start: 2019-08-20 | End: 2020-04-29

## 2019-09-18 ENCOUNTER — TELEPHONE (OUTPATIENT)
Dept: FAMILY MEDICINE CLINIC | Facility: CLINIC | Age: 54
End: 2019-09-18

## 2019-09-18 DIAGNOSIS — E10.8 CONTROLLED DIABETES MELLITUS TYPE 1 WITH COMPLICATIONS (HCC): Primary | ICD-10-CM

## 2019-09-18 NOTE — TELEPHONE ENCOUNTER
Pt states he has stopped using Freestyle She due to faulty readings and would like Dexcom ordered ,pt states he contacted insurance and they stated it was covered. Okay to order Dexcom continuous monitor? Please advise.

## 2019-09-18 NOTE — TELEPHONE ENCOUNTER
Patient called requesting if a new script can be sent to [facility] for the Dexcom continuous glucose monitor. Please advise.

## 2019-09-30 RX ORDER — BLOOD-GLUCOSE SENSOR
1 EACH MISCELLANEOUS CONTINUOUS
Qty: 3 EACH | Refills: 3 | Status: SHIPPED | OUTPATIENT
Start: 2019-09-30 | End: 2019-10-30 | Stop reason: WASHOUT

## 2019-09-30 RX ORDER — BLOOD-GLUCOSE TRANSMITTER
1 EACH MISCELLANEOUS CONTINUOUS
Qty: 1 EACH | Refills: 0 | Status: SHIPPED | OUTPATIENT
Start: 2019-09-30 | End: 2019-11-26

## 2019-09-30 RX ORDER — BLOOD-GLUCOSE,RECEIVER,CONT
1 EACH MISCELLANEOUS CONTINUOUS
Qty: 1 DEVICE | Refills: 0 | Status: SHIPPED | OUTPATIENT
Start: 2019-09-30 | End: 2021-04-25 | Stop reason: ALTCHOICE

## 2019-10-01 ENCOUNTER — TELEPHONE (OUTPATIENT)
Dept: FAMILY MEDICINE CLINIC | Facility: CLINIC | Age: 54
End: 2019-10-01

## 2019-10-04 RX ORDER — INSULIN DETEMIR 100 [IU]/ML
INJECTION, SOLUTION SUBCUTANEOUS
Qty: 45 ML | Refills: 3 | Status: SHIPPED | OUTPATIENT
Start: 2019-10-04 | End: 2020-03-09

## 2019-11-15 ENCOUNTER — OFFICE VISIT (OUTPATIENT)
Dept: FAMILY MEDICINE CLINIC | Facility: CLINIC | Age: 54
End: 2019-11-15
Payer: COMMERCIAL

## 2019-11-15 VITALS
RESPIRATION RATE: 16 BRPM | BODY MASS INDEX: 27.63 KG/M2 | HEART RATE: 80 BPM | TEMPERATURE: 98 F | DIASTOLIC BLOOD PRESSURE: 80 MMHG | HEIGHT: 70 IN | SYSTOLIC BLOOD PRESSURE: 128 MMHG | OXYGEN SATURATION: 98 % | WEIGHT: 193 LBS

## 2019-11-15 DIAGNOSIS — E78.5 HYPERLIPIDEMIA WITH TARGET LDL LESS THAN 100: ICD-10-CM

## 2019-11-15 DIAGNOSIS — Z12.5 SCREENING FOR MALIGNANT NEOPLASM OF PROSTATE: ICD-10-CM

## 2019-11-15 DIAGNOSIS — E55.9 HYPOVITAMINOSIS D: ICD-10-CM

## 2019-11-15 DIAGNOSIS — Z13.29 SCREENING FOR THYROID DISORDER: ICD-10-CM

## 2019-11-15 DIAGNOSIS — E10.9 TYPE 1 DIABETES MELLITUS WITHOUT COMPLICATION (HCC): Primary | ICD-10-CM

## 2019-11-15 PROCEDURE — 99213 OFFICE O/P EST LOW 20 MIN: CPT | Performed by: FAMILY MEDICINE

## 2019-11-15 PROCEDURE — 90686 IIV4 VACC NO PRSV 0.5 ML IM: CPT | Performed by: FAMILY MEDICINE

## 2019-11-15 PROCEDURE — 90471 IMMUNIZATION ADMIN: CPT | Performed by: FAMILY MEDICINE

## 2019-11-15 NOTE — PROGRESS NOTES
Patient is a type I diabetic here for his usual evaluation. He has no complaints. Socially he may be moving out [address] is going reasonably well.   Non-smoker very modest drinker today's exam complete diabetic foot exam was performed and found to

## 2019-11-26 DIAGNOSIS — E10.8 CONTROLLED DIABETES MELLITUS TYPE 1 WITH COMPLICATIONS (HCC): ICD-10-CM

## 2019-11-26 RX ORDER — BLOOD-GLUCOSE SENSOR
EACH MISCELLANEOUS
Qty: 9 EACH | Refills: 3 | Status: SHIPPED | OUTPATIENT
Start: 2019-11-26 | End: 2020-10-26

## 2019-11-26 RX ORDER — BLOOD-GLUCOSE TRANSMITTER
EACH MISCELLANEOUS
Qty: 1 EACH | Refills: 0 | Status: SHIPPED | OUTPATIENT
Start: 2019-11-26 | End: 2020-04-06

## 2019-11-28 ENCOUNTER — HOSPITAL ENCOUNTER (EMERGENCY)
Facility: HOSPITAL | Age: 54
Discharge: HOME | End: 2019-11-28
Attending: EMERGENCY MEDICINE
Payer: COMMERCIAL

## 2019-11-28 ENCOUNTER — APPOINTMENT (OUTPATIENT)
Dept: ULTRASOUND IMAGING | Facility: HOSPITAL | Age: 54
End: 2019-11-28
Attending: EMERGENCY MEDICINE
Payer: COMMERCIAL

## 2019-11-28 ENCOUNTER — APPOINTMENT (OUTPATIENT)
Dept: CT IMAGING | Facility: HOSPITAL | Age: 54
End: 2019-11-28
Attending: EMERGENCY MEDICINE
Payer: COMMERCIAL

## 2019-11-28 VITALS
SYSTOLIC BLOOD PRESSURE: 130 MMHG | HEART RATE: 78 BPM | HEIGHT: 70 IN | BODY MASS INDEX: 26.48 KG/M2 | TEMPERATURE: 98 F | RESPIRATION RATE: 18 BRPM | OXYGEN SATURATION: 97 % | WEIGHT: 185 LBS | DIASTOLIC BLOOD PRESSURE: 78 MMHG

## 2019-11-28 DIAGNOSIS — K80.20 CALCULUS OF GALLBLADDER WITHOUT CHOLECYSTITIS WITHOUT OBSTRUCTION: ICD-10-CM

## 2019-11-28 DIAGNOSIS — R10.9 ABDOMINAL PAIN OF UNKNOWN ETIOLOGY: Primary | ICD-10-CM

## 2019-11-28 PROCEDURE — 99284 EMERGENCY DEPT VISIT MOD MDM: CPT

## 2019-11-28 PROCEDURE — 81001 URINALYSIS AUTO W/SCOPE: CPT | Performed by: EMERGENCY MEDICINE

## 2019-11-28 PROCEDURE — 74176 CT ABD & PELVIS W/O CONTRAST: CPT | Performed by: EMERGENCY MEDICINE

## 2019-11-28 PROCEDURE — 80053 COMPREHEN METABOLIC PANEL: CPT | Performed by: EMERGENCY MEDICINE

## 2019-11-28 PROCEDURE — 85025 COMPLETE CBC W/AUTO DIFF WBC: CPT | Performed by: EMERGENCY MEDICINE

## 2019-11-28 PROCEDURE — 76700 US EXAM ABDOM COMPLETE: CPT | Performed by: EMERGENCY MEDICINE

## 2019-11-28 PROCEDURE — 96360 HYDRATION IV INFUSION INIT: CPT

## 2019-11-28 PROCEDURE — 96361 HYDRATE IV INFUSION ADD-ON: CPT

## 2019-11-28 PROCEDURE — 83690 ASSAY OF LIPASE: CPT | Performed by: EMERGENCY MEDICINE

## 2019-11-28 RX ORDER — SODIUM CHLORIDE 9 MG/ML
INJECTION, SOLUTION INTRAVENOUS CONTINUOUS
Status: DISCONTINUED | OUTPATIENT
Start: 2019-11-28 | End: 2019-11-28

## 2019-11-28 NOTE — ED PROVIDER NOTES
Patient Seen in: [facility] Emergency Department      History   Patient presents with:  Abdomen/Flank Pain (GI/)    Stated Complaint: pt states he has mid right abd pain. HPI    50-year-old male presents with abdominal pain.   He reports sharp, History    Tobacco Use      Smoking status: Never Smoker      Smokeless tobacco: Never Used    Alcohol use: Yes      Alcohol/week: 0.0 standard drinks      Comment: SOCIAL    Drug use:  No             Review of Systems    Positive for stated complaint: pt s DIFFERENTIAL WITH PLATELET.   Procedure                               Abnormality         Status                     ---------                               -----------         ------                     CBC W/ DIFFERENTIAL[[id number]] Abdomen+pelvis Kidneystone 2d Rndr(no Iv,no Oral)(cpt=74176)    Result Date: 11/28/2019  PROCEDURE:  CT ABDOMEN/PELVIS KIDNEYSTONE 2D RNDR (NO IV,NO ORAL) (CPT=74176)  COMPARISON:  DASHAWN MELARA, CT ABDOMEN/PELVIS KIDNEYSTONE WITH 3D (KOU=17364/92154), 8/07/ of the prostate with some central calcifications. There is calcifications within the base of the penis. LUNG BASES:  No visible pulmonary or pleural disease. OTHER:  Negative. CONCLUSION:  1. Uncomplicated nephrolithiasis.  2. Cholelithiasis without

## 2020-02-05 ENCOUNTER — LAB ENCOUNTER (OUTPATIENT)
Dept: LAB | Age: 55
End: 2020-02-05
Attending: FAMILY MEDICINE
Payer: COMMERCIAL

## 2020-02-05 DIAGNOSIS — E55.9 HYPOVITAMINOSIS D: ICD-10-CM

## 2020-02-05 DIAGNOSIS — Z12.5 SCREENING FOR MALIGNANT NEOPLASM OF PROSTATE: ICD-10-CM

## 2020-02-05 DIAGNOSIS — E78.5 HYPERLIPIDEMIA WITH TARGET LDL LESS THAN 100: ICD-10-CM

## 2020-02-05 DIAGNOSIS — Z13.29 SCREENING FOR THYROID DISORDER: ICD-10-CM

## 2020-02-05 DIAGNOSIS — E10.8 CONTROLLED DIABETES MELLITUS TYPE 1 WITH COMPLICATIONS (HCC): ICD-10-CM

## 2020-02-05 DIAGNOSIS — E10.9 TYPE 1 DIABETES MELLITUS WITHOUT COMPLICATION (HCC): ICD-10-CM

## 2020-02-05 LAB
ALBUMIN SERPL-MCNC: 4.1 G/DL (ref 3.4–5)
ALBUMIN/GLOB SERPL: 1.2 {RATIO} (ref 1–2)
ALP LIVER SERPL-CCNC: 88 U/L (ref 45–117)
ALT SERPL-CCNC: 42 U/L (ref 16–61)
ANION GAP SERPL CALC-SCNC: 4 MMOL/L (ref 0–18)
AST SERPL-CCNC: 18 U/L (ref 15–37)
BASOPHILS # BLD AUTO: 0.06 X10(3) UL (ref 0–0.2)
BASOPHILS NFR BLD AUTO: 1.1 %
BILIRUB SERPL-MCNC: 0.5 MG/DL (ref 0.1–2)
BUN BLD-MCNC: 21 MG/DL (ref 7–18)
BUN/CREAT SERPL: 19.6 (ref 10–20)
CALCIUM BLD-MCNC: 10.7 MG/DL (ref 8.5–10.1)
CHLORIDE SERPL-SCNC: 106 MMOL/L (ref 98–112)
CHOLEST SMN-MCNC: 169 MG/DL (ref ?–200)
CO2 SERPL-SCNC: 27 MMOL/L (ref 21–32)
COMPLEXED PSA SERPL-MCNC: 0.94 NG/ML (ref ?–4)
CREAT BLD-MCNC: 1.07 MG/DL (ref 0.7–1.3)
DEPRECATED RDW RBC AUTO: 44.1 FL (ref 35.1–46.3)
EOSINOPHIL # BLD AUTO: 0.14 X10(3) UL (ref 0–0.7)
EOSINOPHIL NFR BLD AUTO: 2.6 %
ERYTHROCYTE [DISTWIDTH] IN BLOOD BY AUTOMATED COUNT: 13.2 % (ref 11–15)
EST. AVERAGE GLUCOSE BLD GHB EST-MCNC: 146 MG/DL (ref 68–126)
GLOBULIN PLAS-MCNC: 3.4 G/DL (ref 2.8–4.4)
GLUCOSE BLD-MCNC: 181 MG/DL (ref 70–99)
HBA1C MFR BLD HPLC: 6.7 % (ref ?–5.7)
HCT VFR BLD AUTO: 49.4 % (ref 39–53)
HDLC SERPL-MCNC: 38 MG/DL (ref 40–59)
HGB BLD-MCNC: 16.8 G/DL (ref 13–17.5)
IMM GRANULOCYTES # BLD AUTO: 0.01 X10(3) UL (ref 0–1)
IMM GRANULOCYTES NFR BLD: 0.2 %
LDLC SERPL CALC-MCNC: 111 MG/DL (ref ?–100)
LYMPHOCYTES # BLD AUTO: 1.41 X10(3) UL (ref 1–4)
LYMPHOCYTES NFR BLD AUTO: 26.1 %
M PROTEIN MFR SERPL ELPH: 7.5 G/DL (ref 6.4–8.2)
MCH RBC QN AUTO: 30.8 PG (ref 26–34)
MCHC RBC AUTO-ENTMCNC: 34 G/DL (ref 31–37)
MCV RBC AUTO: 90.6 FL (ref 80–100)
MONOCYTES # BLD AUTO: 0.41 X10(3) UL (ref 0.1–1)
MONOCYTES NFR BLD AUTO: 7.6 %
NEUTROPHILS # BLD AUTO: 3.38 X10 (3) UL (ref 1.5–7.7)
NEUTROPHILS # BLD AUTO: 3.38 X10(3) UL (ref 1.5–7.7)
NEUTROPHILS NFR BLD AUTO: 62.4 %
NONHDLC SERPL-MCNC: 131 MG/DL (ref ?–130)
OSMOLALITY SERPL CALC.SUM OF ELEC: 292 MOSM/KG (ref 275–295)
PATIENT FASTING Y/N/NP: YES
PATIENT FASTING Y/N/NP: YES
PLATELET # BLD AUTO: 282 10(3)UL (ref 150–450)
POTASSIUM SERPL-SCNC: 3.9 MMOL/L (ref 3.5–5.1)
RBC # BLD AUTO: 5.45 X10(6)UL (ref 4.3–5.7)
SODIUM SERPL-SCNC: 137 MMOL/L (ref 136–145)
TRIGL SERPL-MCNC: 101 MG/DL (ref 30–149)
TSI SER-ACNC: 1.13 MIU/ML (ref 0.36–3.74)
VIT D+METAB SERPL-MCNC: 27.1 NG/ML (ref 30–100)
VLDLC SERPL CALC-MCNC: 20 MG/DL (ref 0–30)
WBC # BLD AUTO: 5.4 X10(3) UL (ref 4–11)

## 2020-02-05 PROCEDURE — 82306 VITAMIN D 25 HYDROXY: CPT

## 2020-02-05 PROCEDURE — 80061 LIPID PANEL: CPT

## 2020-02-05 PROCEDURE — 84443 ASSAY THYROID STIM HORMONE: CPT

## 2020-02-05 PROCEDURE — 83036 HEMOGLOBIN GLYCOSYLATED A1C: CPT

## 2020-02-05 PROCEDURE — 36415 COLL VENOUS BLD VENIPUNCTURE: CPT

## 2020-02-05 PROCEDURE — 80053 COMPREHEN METABOLIC PANEL: CPT

## 2020-02-05 PROCEDURE — 85025 COMPLETE CBC W/AUTO DIFF WBC: CPT

## 2020-02-07 ENCOUNTER — OFFICE VISIT (OUTPATIENT)
Dept: FAMILY MEDICINE CLINIC | Facility: CLINIC | Age: 55
End: 2020-02-07
Payer: COMMERCIAL

## 2020-02-07 VITALS
HEART RATE: 84 BPM | TEMPERATURE: 98 F | BODY MASS INDEX: 27.92 KG/M2 | HEIGHT: 70 IN | OXYGEN SATURATION: 98 % | SYSTOLIC BLOOD PRESSURE: 118 MMHG | WEIGHT: 195 LBS | RESPIRATION RATE: 16 BRPM | DIASTOLIC BLOOD PRESSURE: 78 MMHG

## 2020-02-07 DIAGNOSIS — E10.9 TYPE 1 DIABETES MELLITUS WITHOUT COMPLICATION (HCC): Primary | ICD-10-CM

## 2020-02-07 PROCEDURE — 99213 OFFICE O/P EST LOW 20 MIN: CPT | Performed by: FAMILY MEDICINE

## 2020-02-07 NOTE — PROGRESS NOTES
Patient presents for follow-up of his type 1 diabetes he continues to work hard at this. 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 This challenges his nutrition but for the first time in quite some time his A1c level is 6.7.   He has no other chi

## 2020-02-12 DIAGNOSIS — R07.2 PRECORDIAL CHEST PAIN: ICD-10-CM

## 2020-02-12 DIAGNOSIS — I71.2 ASCENDING AORTIC ANEURYSM (HCC): ICD-10-CM

## 2020-02-12 DIAGNOSIS — E78.5 HYPERLIPIDEMIA WITH TARGET LDL LESS THAN 100: ICD-10-CM

## 2020-02-12 RX ORDER — SIMVASTATIN 10 MG
TABLET ORAL
Qty: 90 TABLET | Refills: 3 | Status: SHIPPED | OUTPATIENT
Start: 2020-02-12 | End: 2020-10-13 | Stop reason: ALTCHOICE

## 2020-03-06 RX ORDER — METFORMIN HYDROCHLORIDE 500 MG/1
TABLET, EXTENDED RELEASE ORAL
Qty: 120 TABLET | Refills: 3 | Status: SHIPPED | OUTPATIENT
Start: 2020-03-06 | End: 2020-06-01

## 2020-03-09 RX ORDER — INSULIN DETEMIR 100 [IU]/ML
INJECTION, SOLUTION SUBCUTANEOUS
Qty: 60 PEN | Refills: 3 | Status: SHIPPED | OUTPATIENT
Start: 2020-03-09 | End: 2021-04-09

## 2020-04-06 DIAGNOSIS — E10.8 CONTROLLED DIABETES MELLITUS TYPE 1 WITH COMPLICATIONS (HCC): ICD-10-CM

## 2020-04-06 RX ORDER — BLOOD-GLUCOSE TRANSMITTER
EACH MISCELLANEOUS
Qty: 1 EACH | Refills: 0 | Status: SHIPPED | OUTPATIENT
Start: 2020-04-06 | End: 2020-07-10

## 2020-04-29 RX ORDER — SITAGLIPTIN 100 MG/1
TABLET, FILM COATED ORAL
Qty: 90 TABLET | Refills: 0 | Status: SHIPPED | OUTPATIENT
Start: 2020-04-29 | End: 2020-07-21

## 2020-06-01 RX ORDER — METFORMIN HYDROCHLORIDE 500 MG/1
TABLET, EXTENDED RELEASE ORAL
Qty: 360 TABLET | Refills: 1 | Status: SHIPPED | OUTPATIENT
Start: 2020-06-01 | End: 2021-08-30

## 2020-06-05 ENCOUNTER — OFFICE VISIT (OUTPATIENT)
Dept: FAMILY MEDICINE CLINIC | Facility: CLINIC | Age: 55
End: 2020-06-05
Payer: COMMERCIAL

## 2020-06-05 VITALS
TEMPERATURE: 98 F | HEART RATE: 70 BPM | RESPIRATION RATE: 18 BRPM | SYSTOLIC BLOOD PRESSURE: 128 MMHG | HEIGHT: 70 IN | DIASTOLIC BLOOD PRESSURE: 78 MMHG | WEIGHT: 197 LBS | OXYGEN SATURATION: 98 % | BODY MASS INDEX: 28.2 KG/M2

## 2020-06-05 DIAGNOSIS — N20.0 KIDNEY STONE: Primary | ICD-10-CM

## 2020-06-05 PROCEDURE — 99213 OFFICE O/P EST LOW 20 MIN: CPT | Performed by: FAMILY MEDICINE

## 2020-06-05 NOTE — PROGRESS NOTES
Patient well-known to me is here for his type I diabetic check and to discuss management of his recurrent kidney stones he is currently asymptomatic taking his usual excellent care to monitor his blood sugars.     Diabetic foot exam was normal.Bilateral bar

## 2020-07-10 ENCOUNTER — TELEPHONE (OUTPATIENT)
Dept: FAMILY MEDICINE CLINIC | Facility: CLINIC | Age: 55
End: 2020-07-10

## 2020-07-10 DIAGNOSIS — E10.8 CONTROLLED DIABETES MELLITUS TYPE 1 WITH COMPLICATIONS (HCC): ICD-10-CM

## 2020-07-10 RX ORDER — INSULIN LISPRO 100 [IU]/ML
INJECTION, SOLUTION INTRAVENOUS; SUBCUTANEOUS
Qty: 30 PEN | Refills: 5 | Status: SHIPPED | OUTPATIENT
Start: 2020-07-10 | End: 2021-08-30

## 2020-07-10 RX ORDER — BLOOD-GLUCOSE TRANSMITTER
EACH MISCELLANEOUS
Qty: 1 EACH | Refills: 0 | Status: SHIPPED | OUTPATIENT
Start: 2020-07-10 | End: 2020-10-26

## 2020-07-21 RX ORDER — SITAGLIPTIN 100 MG/1
TABLET, FILM COATED ORAL
Qty: 90 TABLET | Refills: 0 | Status: SHIPPED | OUTPATIENT
Start: 2020-07-21 | End: 2021-07-30

## 2020-07-29 PROBLEM — N20.0 KIDNEY STONE: Status: ACTIVE | Noted: 2020-07-29

## 2020-08-12 ENCOUNTER — TELEPHONE (OUTPATIENT)
Dept: FAMILY MEDICINE CLINIC | Facility: CLINIC | Age: 55
End: 2020-08-12

## 2020-08-12 NOTE — TELEPHONE ENCOUNTER
RECEIVED A FAX PT IS HAVING SURGERY BUT THERE IS NO SURGERY DATE. LVM FOR THEIR SURGERY SCHEDULER TO REFAX OVER A CORRECTED COPY WITH THE SURGERY DATE.     FAX IN  TRIAGE BIN

## 2020-08-13 NOTE — TELEPHONE ENCOUNTER
Received corrected fax. Called patient and scheduled with [employer] for his presurgical visit.     Put paperwork in [employer] upcoming appt folder

## 2020-09-08 ENCOUNTER — LAB ENCOUNTER (OUTPATIENT)
Dept: LAB | Age: 55
End: 2020-09-08
Attending: FAMILY MEDICINE
Payer: COMMERCIAL

## 2020-09-08 DIAGNOSIS — N20.0 KIDNEY STONE: ICD-10-CM

## 2020-09-08 DIAGNOSIS — E10.65 UNCONTROLLED TYPE 1 DIABETES MELLITUS WITH HYPERGLYCEMIA (HCC): ICD-10-CM

## 2020-09-08 DIAGNOSIS — E10.9 TYPE 1 DIABETES MELLITUS WITHOUT COMPLICATION (HCC): ICD-10-CM

## 2020-09-08 LAB
ALBUMIN SERPL-MCNC: 3.8 G/DL (ref 3.4–5)
ALBUMIN/GLOB SERPL: 1.2 {RATIO} (ref 1–2)
ALP LIVER SERPL-CCNC: 87 U/L (ref 45–117)
ALT SERPL-CCNC: 49 U/L (ref 16–61)
ANION GAP SERPL CALC-SCNC: 6 MMOL/L (ref 0–18)
AST SERPL-CCNC: 24 U/L (ref 15–37)
BASOPHILS # BLD AUTO: 0.05 X10(3) UL (ref 0–0.2)
BASOPHILS NFR BLD AUTO: 0.8 %
BILIRUB SERPL-MCNC: 0.7 MG/DL (ref 0.1–2)
BUN BLD-MCNC: 19 MG/DL (ref 7–18)
BUN/CREAT SERPL: 17.8 (ref 10–20)
CALCIUM BLD-MCNC: 10.6 MG/DL (ref 8.5–10.1)
CHLORIDE SERPL-SCNC: 104 MMOL/L (ref 98–112)
CO2 SERPL-SCNC: 27 MMOL/L (ref 21–32)
CREAT BLD-MCNC: 1.07 MG/DL (ref 0.7–1.3)
CREAT UR-SCNC: 124 MG/DL
DEPRECATED RDW RBC AUTO: 44.9 FL (ref 35.1–46.3)
EOSINOPHIL # BLD AUTO: 0.21 X10(3) UL (ref 0–0.7)
EOSINOPHIL NFR BLD AUTO: 3.4 %
ERYTHROCYTE [DISTWIDTH] IN BLOOD BY AUTOMATED COUNT: 13.4 % (ref 11–15)
EST. AVERAGE GLUCOSE BLD GHB EST-MCNC: 169 MG/DL (ref 68–126)
GLOBULIN PLAS-MCNC: 3.3 G/DL (ref 2.8–4.4)
GLUCOSE BLD-MCNC: 146 MG/DL (ref 70–99)
HBA1C MFR BLD HPLC: 7.5 % (ref ?–5.7)
HCT VFR BLD AUTO: 47.8 % (ref 39–53)
HGB BLD-MCNC: 15.9 G/DL (ref 13–17.5)
IMM GRANULOCYTES # BLD AUTO: 0.02 X10(3) UL (ref 0–1)
IMM GRANULOCYTES NFR BLD: 0.3 %
LYMPHOCYTES # BLD AUTO: 1.45 X10(3) UL (ref 1–4)
LYMPHOCYTES NFR BLD AUTO: 23.4 %
M PROTEIN MFR SERPL ELPH: 7.1 G/DL (ref 6.4–8.2)
MCH RBC QN AUTO: 30.3 PG (ref 26–34)
MCHC RBC AUTO-ENTMCNC: 33.3 G/DL (ref 31–37)
MCV RBC AUTO: 91.2 FL (ref 80–100)
MICROALBUMIN UR-MCNC: 4.92 MG/DL
MICROALBUMIN/CREAT 24H UR-RTO: 39.7 UG/MG (ref ?–30)
MONOCYTES # BLD AUTO: 0.44 X10(3) UL (ref 0.1–1)
MONOCYTES NFR BLD AUTO: 7.1 %
NEUTROPHILS # BLD AUTO: 4.03 X10 (3) UL (ref 1.5–7.7)
NEUTROPHILS # BLD AUTO: 4.03 X10(3) UL (ref 1.5–7.7)
NEUTROPHILS NFR BLD AUTO: 65 %
OSMOLALITY SERPL CALC.SUM OF ELEC: 289 MOSM/KG (ref 275–295)
PATIENT FASTING Y/N/NP: YES
PLATELET # BLD AUTO: 290 10(3)UL (ref 150–450)
POTASSIUM SERPL-SCNC: 4.2 MMOL/L (ref 3.5–5.1)
RBC # BLD AUTO: 5.24 X10(6)UL (ref 4.3–5.7)
SODIUM SERPL-SCNC: 137 MMOL/L (ref 136–145)
WBC # BLD AUTO: 6.2 X10(3) UL (ref 4–11)

## 2020-09-08 PROCEDURE — 82570 ASSAY OF URINE CREATININE: CPT

## 2020-09-08 PROCEDURE — 36415 COLL VENOUS BLD VENIPUNCTURE: CPT

## 2020-09-08 PROCEDURE — 80053 COMPREHEN METABOLIC PANEL: CPT

## 2020-09-08 PROCEDURE — 83036 HEMOGLOBIN GLYCOSYLATED A1C: CPT

## 2020-09-08 PROCEDURE — 85025 COMPLETE CBC W/AUTO DIFF WBC: CPT

## 2020-09-08 PROCEDURE — 82043 UR ALBUMIN QUANTITATIVE: CPT

## 2020-09-10 ENCOUNTER — OFFICE VISIT (OUTPATIENT)
Dept: FAMILY MEDICINE CLINIC | Facility: CLINIC | Age: 55
End: 2020-09-10
Payer: COMMERCIAL

## 2020-09-10 VITALS
TEMPERATURE: 97 F | DIASTOLIC BLOOD PRESSURE: 78 MMHG | HEART RATE: 74 BPM | HEIGHT: 70 IN | RESPIRATION RATE: 18 BRPM | WEIGHT: 199.19 LBS | BODY MASS INDEX: 28.52 KG/M2 | OXYGEN SATURATION: 98 % | SYSTOLIC BLOOD PRESSURE: 130 MMHG

## 2020-09-10 DIAGNOSIS — E78.5 HYPERLIPIDEMIA WITH TARGET LDL LESS THAN 100: ICD-10-CM

## 2020-09-10 DIAGNOSIS — Z01.818 PREOP TESTING: Primary | ICD-10-CM

## 2020-09-10 DIAGNOSIS — E10.8 CONTROLLED DIABETES MELLITUS TYPE 1 WITH COMPLICATIONS (HCC): ICD-10-CM

## 2020-09-10 PROCEDURE — 3008F BODY MASS INDEX DOCD: CPT | Performed by: FAMILY MEDICINE

## 2020-09-10 PROCEDURE — 99214 OFFICE O/P EST MOD 30 MIN: CPT | Performed by: FAMILY MEDICINE

## 2020-09-10 PROCEDURE — 3078F DIAST BP <80 MM HG: CPT | Performed by: FAMILY MEDICINE

## 2020-09-10 PROCEDURE — 3075F SYST BP GE 130 - 139MM HG: CPT | Performed by: FAMILY MEDICINE

## 2020-09-10 NOTE — PROGRESS NOTES
I have been asked to serve as medical consultant for patient Austin Mckeon a patient well-known to my practice and well-controlled type I diabetic.   He will have a right extracorporeal shockwave lithotripsy performed later in September by Dr. Freida Snellen at the pa

## 2020-09-11 ENCOUNTER — PATIENT MESSAGE (OUTPATIENT)
Dept: FAMILY MEDICINE CLINIC | Facility: CLINIC | Age: 55
End: 2020-09-11

## 2020-09-12 NOTE — TELEPHONE ENCOUNTER
From: Melissa Daniel  To: Jolanta Schroeder DO  Sent: 9/11/2020 3:49 PM CDT  Subject: Test Results Question    Hi Dr Davina Alba.     I noticed that I did not get the Lipid Panel on the last set of tests and that it looks like it isn't ordered for the next time

## 2020-09-17 ENCOUNTER — NURSE ONLY (OUTPATIENT)
Dept: FAMILY MEDICINE CLINIC | Facility: CLINIC | Age: 55
End: 2020-09-17
Payer: COMMERCIAL

## 2020-09-17 PROCEDURE — 90686 IIV4 VACC NO PRSV 0.5 ML IM: CPT | Performed by: FAMILY MEDICINE

## 2020-09-17 PROCEDURE — 90471 IMMUNIZATION ADMIN: CPT | Performed by: FAMILY MEDICINE

## 2020-09-17 NOTE — PROGRESS NOTES
[phone] (home)   Spoke to pt advised him of the below MD recommendations .  He verbalized understanding

## 2020-09-24 PROBLEM — E10.9 TYPE 1 DIABETES (HCC): Status: RESOLVED | Noted: 2019-05-14 | Resolved: 2020-09-24

## 2020-09-29 PROCEDURE — 82340 ASSAY OF CALCIUM IN URINE: CPT

## 2020-09-29 PROCEDURE — 82436 ASSAY OF URINE CHLORIDE: CPT

## 2020-09-29 PROCEDURE — 84392 ASSAY OF URINE SULFATE: CPT

## 2020-09-29 PROCEDURE — 83945 ASSAY OF OXALATE: CPT

## 2020-09-29 PROCEDURE — 82507 ASSAY OF CITRATE: CPT

## 2020-09-30 ENCOUNTER — LAB ENCOUNTER (OUTPATIENT)
Dept: LAB | Age: 55
End: 2020-09-30
Attending: UROLOGY
Payer: COMMERCIAL

## 2020-09-30 DIAGNOSIS — N20.0 KIDNEY STONE: ICD-10-CM

## 2020-09-30 PROCEDURE — 82365 CALCULUS SPECTROSCOPY: CPT

## 2020-10-09 NOTE — PROGRESS NOTES
Please check in with patient that he collected correctly. 5000cc uop in 24h is an extremely high volume  And minerals are not in correct ranges.    Please prepare him for a follow up visit discussion  Future Appointments  10/14/2020 10:30 AM   Hever Nelson

## 2020-10-23 ENCOUNTER — HOSPITAL ENCOUNTER (OUTPATIENT)
Dept: CV DIAGNOSTICS | Facility: HOSPITAL | Age: 55
Discharge: HOME | End: 2020-10-23
Attending: INTERNAL MEDICINE
Payer: COMMERCIAL

## 2020-10-23 DIAGNOSIS — I71.2 ASCENDING AORTIC ANEURYSM (HCC): ICD-10-CM

## 2020-10-23 PROCEDURE — 93306 TTE W/DOPPLER COMPLETE: CPT | Performed by: INTERNAL MEDICINE

## 2020-10-26 DIAGNOSIS — E10.8 CONTROLLED DIABETES MELLITUS TYPE 1 WITH COMPLICATIONS (HCC): ICD-10-CM

## 2020-10-26 RX ORDER — BLOOD-GLUCOSE SENSOR
EACH MISCELLANEOUS
Qty: 9 EACH | Refills: 3 | Status: SHIPPED | OUTPATIENT
Start: 2020-10-26 | End: 2021-04-25 | Stop reason: ALTCHOICE

## 2020-10-26 RX ORDER — BLOOD-GLUCOSE TRANSMITTER
EACH MISCELLANEOUS
Qty: 1 EACH | Refills: 0 | Status: SHIPPED | OUTPATIENT
Start: 2020-10-26 | End: 2021-02-03

## 2020-12-11 ENCOUNTER — OFFICE VISIT (OUTPATIENT)
Dept: FAMILY MEDICINE CLINIC | Facility: CLINIC | Age: 55
End: 2020-12-11
Payer: COMMERCIAL

## 2020-12-11 VITALS
BODY MASS INDEX: 28.92 KG/M2 | HEART RATE: 88 BPM | RESPIRATION RATE: 16 BRPM | DIASTOLIC BLOOD PRESSURE: 100 MMHG | WEIGHT: 202 LBS | OXYGEN SATURATION: 98 % | SYSTOLIC BLOOD PRESSURE: 148 MMHG | HEIGHT: 70 IN

## 2020-12-11 DIAGNOSIS — E10.8 CONTROLLED DIABETES MELLITUS TYPE 1 WITH COMPLICATIONS (HCC): Primary | ICD-10-CM

## 2020-12-11 DIAGNOSIS — E78.5 HYPERLIPIDEMIA WITH TARGET LDL LESS THAN 100: ICD-10-CM

## 2020-12-11 PROCEDURE — 3077F SYST BP >= 140 MM HG: CPT | Performed by: FAMILY MEDICINE

## 2020-12-11 PROCEDURE — 3080F DIAST BP >= 90 MM HG: CPT | Performed by: FAMILY MEDICINE

## 2020-12-11 PROCEDURE — 3008F BODY MASS INDEX DOCD: CPT | Performed by: FAMILY MEDICINE

## 2020-12-11 PROCEDURE — 99213 OFFICE O/P EST LOW 20 MIN: CPT | Performed by: FAMILY MEDICINE

## 2020-12-11 NOTE — PROGRESS NOTES
[facility] Progress Note    SUBJECTIVE: Estella Rockford 48year old male is here today for Patient presents with:  Diabetes  Annual Physical    Reviewed cholesterol and looked good. Needs to move his labs to [facility], will go to [facility]. at [address] Right 9/24/2020    Performed by Randall Morton MD at [address] [place] [address]   • ESWL LITHOTRIPSY WITH CYSTOSCOPY, STENT PLACEMENT Right 9/24/2020    Performed by Randall Morton MD at [address] not apply Misc USE WITH INSULIN AS DIRECTED 3 Box 3   • Continuous Blood Gluc  (DEXCOM G6 ) Does not apply Device 1 Device by Does not apply route continuous.  1 Device 0   • Probiotic Product (PROBIOTIC-10) Oral Cap Take 1 capsule by mouth

## 2021-02-03 DIAGNOSIS — E10.8 CONTROLLED DIABETES MELLITUS TYPE 1 WITH COMPLICATIONS (HCC): ICD-10-CM

## 2021-02-03 RX ORDER — BLOOD-GLUCOSE TRANSMITTER
1 EACH MISCELLANEOUS AS DIRECTED
Qty: 1 EACH | Refills: 0 | Status: SHIPPED | OUTPATIENT
Start: 2021-02-03 | End: 2021-05-05

## 2021-04-09 RX ORDER — INSULIN DETEMIR 100 [IU]/ML
INJECTION, SOLUTION SUBCUTANEOUS
Qty: 60 PEN | Refills: 3 | Status: SHIPPED | OUTPATIENT
Start: 2021-04-09 | End: 2021-08-12 | Stop reason: SDUPTHER

## 2021-04-09 NOTE — TELEPHONE ENCOUNTER
Last ov 12/11/2020    Next ov 4/16/2021    Last refill 3/9/2020    .   HGBA1C:    Lab Results   Component Value Date    A1C 8.3 (H) 12/16/2020    A1C 7.5 (H) 09/08/2020    A1C 6.7 (H) 02/05/2020     (H) 09/08/2020

## 2021-04-11 ENCOUNTER — HOSPITAL ENCOUNTER (EMERGENCY)
Facility: HOSPITAL | Age: 56
Discharge: HOME | End: 2021-04-11
Attending: EMERGENCY MEDICINE
Payer: COMMERCIAL

## 2021-04-11 ENCOUNTER — APPOINTMENT (OUTPATIENT)
Dept: CT IMAGING | Facility: HOSPITAL | Age: 56
End: 2021-04-11
Attending: EMERGENCY MEDICINE
Payer: COMMERCIAL

## 2021-04-11 ENCOUNTER — APPOINTMENT (OUTPATIENT)
Dept: ULTRASOUND IMAGING | Facility: HOSPITAL | Age: 56
End: 2021-04-11
Attending: EMERGENCY MEDICINE
Payer: COMMERCIAL

## 2021-04-11 VITALS
OXYGEN SATURATION: 97 % | HEART RATE: 80 BPM | DIASTOLIC BLOOD PRESSURE: 96 MMHG | RESPIRATION RATE: 16 BRPM | BODY MASS INDEX: 29 KG/M2 | SYSTOLIC BLOOD PRESSURE: 144 MMHG | TEMPERATURE: 97 F | WEIGHT: 201.94 LBS

## 2021-04-11 DIAGNOSIS — K80.50 BILIARY COLIC: Primary | ICD-10-CM

## 2021-04-11 PROCEDURE — 99284 EMERGENCY DEPT VISIT MOD MDM: CPT

## 2021-04-11 PROCEDURE — 80053 COMPREHEN METABOLIC PANEL: CPT | Performed by: EMERGENCY MEDICINE

## 2021-04-11 PROCEDURE — 85025 COMPLETE CBC W/AUTO DIFF WBC: CPT | Performed by: EMERGENCY MEDICINE

## 2021-04-11 PROCEDURE — 74176 CT ABD & PELVIS W/O CONTRAST: CPT | Performed by: EMERGENCY MEDICINE

## 2021-04-11 PROCEDURE — 96375 TX/PRO/DX INJ NEW DRUG ADDON: CPT

## 2021-04-11 PROCEDURE — 81001 URINALYSIS AUTO W/SCOPE: CPT | Performed by: EMERGENCY MEDICINE

## 2021-04-11 PROCEDURE — 96374 THER/PROPH/DIAG INJ IV PUSH: CPT

## 2021-04-11 PROCEDURE — 76700 US EXAM ABDOM COMPLETE: CPT | Performed by: EMERGENCY MEDICINE

## 2021-04-11 PROCEDURE — 96361 HYDRATE IV INFUSION ADD-ON: CPT

## 2021-04-11 PROCEDURE — 83690 ASSAY OF LIPASE: CPT | Performed by: EMERGENCY MEDICINE

## 2021-04-11 RX ORDER — ONDANSETRON 2 MG/ML
4 INJECTION INTRAMUSCULAR; INTRAVENOUS ONCE
Status: COMPLETED | OUTPATIENT
Start: 2021-04-11 | End: 2021-04-11

## 2021-04-11 RX ORDER — HYDROMORPHONE HYDROCHLORIDE 1 MG/ML
0.5 INJECTION, SOLUTION INTRAMUSCULAR; INTRAVENOUS; SUBCUTANEOUS ONCE
Status: COMPLETED | OUTPATIENT
Start: 2021-04-11 | End: 2021-04-11

## 2021-04-11 RX ORDER — SODIUM CHLORIDE 9 MG/ML
INJECTION, SOLUTION INTRAVENOUS CONTINUOUS
Status: DISCONTINUED | OUTPATIENT
Start: 2021-04-11 | End: 2021-04-11

## 2021-04-11 RX ORDER — ONDANSETRON 4 MG/1
4 TABLET, ORALLY DISINTEGRATING ORAL EVERY 4 HOURS PRN
Qty: 10 TABLET | Refills: 0 | Status: SHIPPED | OUTPATIENT
Start: 2021-04-11 | End: 2021-04-16 | Stop reason: ALTCHOICE

## 2021-04-11 NOTE — ED PROVIDER NOTES
Patient Seen in: [facility] Emergency Department      History   Patient presents with:  Abdomen/Flank Pain    Stated Complaint: Pt states he thinks he has a kidney stone.      HPI/Subjective:   HPI    71-year-old male who presents to the emergency de WITH CYSTOSCOPY, STENT PLACEMENT Right 9/24/2020    Performed by Sailaja Zaldivar MD at [place] [address] N/A 12/2/2013    Performed by Genaro Feliz MD at [address] [place] [address]   • LITHOTRIPSY Right 09/24/2020    right ESWL with Neurologically intact.        ED Course     Labs Reviewed   COMP METABOLIC PANEL (14) - Abnormal; Notable for the following components:       Result Value    Glucose 182 (*)     BUN 23 (*)     BUN/CREA Ratio 24.2 (*)     All other components within normal l quadrant pain.            FINDINGS:     KIDNEYS:  Few punctate calcifications within the kidneys bilaterally measure up to 3 mm.  No hydronephrosis, hydroureter, perinephric or periureteral stranding.     ADRENALS:  No mass or enlargement.     LIVER:  Smal common bile duct, pancreas, spleen, kidneys, IVC, and aorta.       PATIENT STATED HISTORY: (As transcribed by Technologist)              FINDINGS:     LIVER:  Normal size and echogenicity. No significant masses.    BILIARY:  Cholelithiasis.  No wall thicken mild common duct dilation to 6.8 mm. Negative Garcia sign. Pancreas abdominal aorta and inferior vena cava are obscured limiting evaluation but he had a normal lipase.   Bilateral punctate nonobstructing renal calcifications  The patient was pain-free on

## 2021-04-11 NOTE — ED INITIAL ASSESSMENT (HPI)
Pt to ed with RLQ abdominal pain 8/10 with associated bile spit up. Pt has hx of gallstones and kidney stones. Had lithroscopy 9/24/20. Pt feels like it is kidney stones again. Received J&J Covid vaccine on Tuesday this week.

## 2021-04-16 ENCOUNTER — OFFICE VISIT (OUTPATIENT)
Dept: FAMILY MEDICINE CLINIC | Facility: CLINIC | Age: 56
End: 2021-04-16
Payer: COMMERCIAL

## 2021-04-16 VITALS
SYSTOLIC BLOOD PRESSURE: 132 MMHG | RESPIRATION RATE: 16 BRPM | OXYGEN SATURATION: 98 % | HEART RATE: 84 BPM | WEIGHT: 199 LBS | BODY MASS INDEX: 28.49 KG/M2 | HEIGHT: 70 IN | DIASTOLIC BLOOD PRESSURE: 78 MMHG

## 2021-04-16 DIAGNOSIS — E10.8 CONTROLLED DIABETES MELLITUS TYPE 1 WITH COMPLICATIONS (HCC): Primary | ICD-10-CM

## 2021-04-16 PROCEDURE — 99213 OFFICE O/P EST LOW 20 MIN: CPT | Performed by: FAMILY MEDICINE

## 2021-04-16 PROCEDURE — 3052F HG A1C>EQUAL 8.0%<EQUAL 9.0%: CPT | Performed by: FAMILY MEDICINE

## 2021-04-16 PROCEDURE — 3078F DIAST BP <80 MM HG: CPT | Performed by: FAMILY MEDICINE

## 2021-04-16 PROCEDURE — 3008F BODY MASS INDEX DOCD: CPT | Performed by: FAMILY MEDICINE

## 2021-04-16 PROCEDURE — 83036 HEMOGLOBIN GLYCOSYLATED A1C: CPT | Performed by: FAMILY MEDICINE

## 2021-04-16 PROCEDURE — 3075F SYST BP GE 130 - 139MM HG: CPT | Performed by: FAMILY MEDICINE

## 2021-04-16 RX ORDER — AMOXICILLIN 500 MG/1
CAPSULE ORAL
COMMUNITY
Start: 2021-04-14 | End: 2021-04-23

## 2021-04-16 NOTE — PROGRESS NOTES
[facility] Progress Note    SUBJECTIVE: Eleno Flores 55year old male is here today for Patient presents with:  Diabetes      Was seen in ER a few days ago, and likely with biliary colic. Not infected, but symptomatic cholelithiasis.     Thinks Meds:   Current Outpatient Medications   Medication Sig Dispense Refill   • insulin detemir (LEVEMIR FLEXTOUCH) 100 UNIT/ML Subcutaneous Solution Pen-injector INJECT 30-35 UNITS UNDER THE SKIN EVERY MORNING AND EVERY EVENING 60 pen 3   • VALSARTAN 16 GLYCEMIC CONTROL/CHANGE TREATMENT 3 HRS         Blood pressure back down in normal range. a1c 8.2 today. Will have him see diabetes center to help with better control and frankly pump initiation.          Total Time spent with patient and coordinating ca

## 2021-04-21 ENCOUNTER — OFFICE VISIT (OUTPATIENT)
Dept: SURGERY | Facility: CLINIC | Age: 56
End: 2021-04-21
Payer: COMMERCIAL

## 2021-04-21 VITALS — TEMPERATURE: 97 F | HEART RATE: 62 BPM | DIASTOLIC BLOOD PRESSURE: 78 MMHG | SYSTOLIC BLOOD PRESSURE: 119 MMHG

## 2021-04-21 DIAGNOSIS — K80.12 CALCULUS OF GALLBLADDER WITH ACUTE ON CHRONIC CHOLECYSTITIS WITHOUT OBSTRUCTION: Primary | ICD-10-CM

## 2021-04-21 PROCEDURE — 3078F DIAST BP <80 MM HG: CPT | Performed by: SURGERY

## 2021-04-21 PROCEDURE — 99204 OFFICE O/P NEW MOD 45 MIN: CPT | Performed by: SURGERY

## 2021-04-21 PROCEDURE — 3074F SYST BP LT 130 MM HG: CPT | Performed by: SURGERY

## 2021-04-21 NOTE — H&P
New Patient Visit Note       Active Problems      No diagnosis found. Chief Complaint   Patient presents with:  Gallbladder: gallbladder consult -- Pain and discomfort. States n/v about 10 days ago, not today. Denies fever or chills.        History of Pr study, abnormal    • Coronary atherosclerosis    • Gall stone    • Kidney stone 7/29/2020   • Type I (juvenile type) diabetes mellitus without mention of complication, not stated as uncontrolled    • Type II or unspecified type diabetes mellitus without me Solution Pen-injector, INJECT UP TO 10 UNITS UNDER THE SKIN 3 TIMES A DAY AS DIRECTED  METFORMIN HCL  MG Oral Tablet 24 Hr, TAKE 2 TABLETS BY MOUTH TWICE A DAY WITH MEALS  Insulin Pen Needle (BD PEN NEEDLE MINI U/F) 31G X 5 MM Does not apply Misc, US disturbance. Physical Findings   /78   Pulse 62   Temp 97.2 °F (36.2 °C)   Physical Exam  Vitals and nursing note reviewed. Constitutional:       Appearance: He is well-developed. HENT:      Head: Normocephalic and atraumatic.    Eyes:

## 2021-04-26 ENCOUNTER — TELEPHONE (OUTPATIENT)
Dept: SURGERY | Facility: CLINIC | Age: 56
End: 2021-04-26

## 2021-04-26 DIAGNOSIS — Z86.010 PERSONAL HISTORY OF COLONIC POLYPS: Primary | ICD-10-CM

## 2021-04-26 NOTE — TELEPHONE ENCOUNTER
Called pt to r/s Colonoscopy from 6-111 at [facility] to 6-18 due to ins issues

## 2021-04-27 ENCOUNTER — LAB ENCOUNTER (OUTPATIENT)
Dept: LAB | Age: 56
End: 2021-04-27
Attending: SURGERY
Payer: COMMERCIAL

## 2021-04-27 DIAGNOSIS — K80.20 CALCULUS OF GALLBLADDER: ICD-10-CM

## 2021-04-29 ENCOUNTER — ANESTHESIA EVENT (OUTPATIENT)
Dept: SURGERY | Facility: HOSPITAL | Age: 56
End: 2021-04-29
Payer: COMMERCIAL

## 2021-04-30 ENCOUNTER — HOSPITAL ENCOUNTER (OUTPATIENT)
Facility: HOSPITAL | Age: 56
Setting detail: HOSPITAL OUTPATIENT SURGERY
Discharge: HOME | End: 2021-04-30
Attending: SURGERY | Admitting: SURGERY
Payer: COMMERCIAL

## 2021-04-30 ENCOUNTER — ANESTHESIA (OUTPATIENT)
Dept: SURGERY | Facility: HOSPITAL | Age: 56
End: 2021-04-30
Payer: COMMERCIAL

## 2021-04-30 ENCOUNTER — APPOINTMENT (OUTPATIENT)
Dept: GENERAL RADIOLOGY | Facility: HOSPITAL | Age: 56
End: 2021-04-30
Attending: SURGERY
Payer: COMMERCIAL

## 2021-04-30 VITALS
HEART RATE: 71 BPM | WEIGHT: 199 LBS | HEIGHT: 70 IN | SYSTOLIC BLOOD PRESSURE: 140 MMHG | BODY MASS INDEX: 28.49 KG/M2 | TEMPERATURE: 98 F | DIASTOLIC BLOOD PRESSURE: 97 MMHG | OXYGEN SATURATION: 93 % | RESPIRATION RATE: 18 BRPM

## 2021-04-30 DIAGNOSIS — K80.20 CALCULUS OF GALLBLADDER: Primary | ICD-10-CM

## 2021-04-30 DIAGNOSIS — K80.12 CALCULUS OF GALLBLADDER WITH ACUTE ON CHRONIC CHOLECYSTITIS WITHOUT OBSTRUCTION: ICD-10-CM

## 2021-04-30 PROCEDURE — 47563 LAPARO CHOLECYSTECTOMY/GRAPH: CPT | Performed by: PHYSICIAN ASSISTANT

## 2021-04-30 PROCEDURE — 0FT44ZZ RESECTION OF GALLBLADDER, PERCUTANEOUS ENDOSCOPIC APPROACH: ICD-10-PCS | Performed by: SURGERY

## 2021-04-30 PROCEDURE — 47563 LAPARO CHOLECYSTECTOMY/GRAPH: CPT | Performed by: SURGERY

## 2021-04-30 PROCEDURE — BF10YZZ FLUOROSCOPY OF BILE DUCTS USING OTHER CONTRAST: ICD-10-PCS | Performed by: SURGERY

## 2021-04-30 PROCEDURE — 74300 X-RAY BILE DUCTS/PANCREAS: CPT | Performed by: SURGERY

## 2021-04-30 RX ORDER — DEXTROSE MONOHYDRATE 25 G/50ML
50 INJECTION, SOLUTION INTRAVENOUS
Status: DISCONTINUED | OUTPATIENT
Start: 2021-04-30 | End: 2021-04-30

## 2021-04-30 RX ORDER — SODIUM CHLORIDE, SODIUM LACTATE, POTASSIUM CHLORIDE, CALCIUM CHLORIDE 600; 310; 30; 20 MG/100ML; MG/100ML; MG/100ML; MG/100ML
INJECTION, SOLUTION INTRAVENOUS CONTINUOUS
Status: DISCONTINUED | OUTPATIENT
Start: 2021-04-30 | End: 2021-04-30

## 2021-04-30 RX ORDER — HYDROCODONE BITARTRATE AND ACETAMINOPHEN 5; 325 MG/1; MG/1
2 TABLET ORAL AS NEEDED
Status: COMPLETED | OUTPATIENT
Start: 2021-04-30 | End: 2021-04-30

## 2021-04-30 RX ORDER — GLYCOPYRROLATE 0.2 MG/ML
INJECTION, SOLUTION INTRAMUSCULAR; INTRAVENOUS AS NEEDED
Status: DISCONTINUED | OUTPATIENT
Start: 2021-04-30 | End: 2021-04-30 | Stop reason: SURG

## 2021-04-30 RX ORDER — HEPARIN SODIUM 5000 [USP'U]/ML
5000 INJECTION, SOLUTION INTRAVENOUS; SUBCUTANEOUS ONCE
Status: COMPLETED | OUTPATIENT
Start: 2021-04-30 | End: 2021-04-30

## 2021-04-30 RX ORDER — ONDANSETRON 2 MG/ML
4 INJECTION INTRAMUSCULAR; INTRAVENOUS AS NEEDED
Status: DISCONTINUED | OUTPATIENT
Start: 2021-04-30 | End: 2021-04-30

## 2021-04-30 RX ORDER — HYDROCODONE BITARTRATE AND ACETAMINOPHEN 5; 325 MG/1; MG/1
1 TABLET ORAL AS NEEDED
Status: COMPLETED | OUTPATIENT
Start: 2021-04-30 | End: 2021-04-30

## 2021-04-30 RX ORDER — BUPIVACAINE HYDROCHLORIDE 5 MG/ML
INJECTION, SOLUTION EPIDURAL; INTRACAUDAL AS NEEDED
Status: DISCONTINUED | OUTPATIENT
Start: 2021-04-30 | End: 2021-04-30 | Stop reason: HOSPADM

## 2021-04-30 RX ORDER — DEXTROSE MONOHYDRATE 25 G/50ML
50 INJECTION, SOLUTION INTRAVENOUS
Status: DISCONTINUED | OUTPATIENT
Start: 2021-04-30 | End: 2021-04-30 | Stop reason: HOSPADM

## 2021-04-30 RX ORDER — ROCURONIUM BROMIDE 10 MG/ML
INJECTION, SOLUTION INTRAVENOUS AS NEEDED
Status: DISCONTINUED | OUTPATIENT
Start: 2021-04-30 | End: 2021-04-30 | Stop reason: SURG

## 2021-04-30 RX ORDER — NALOXONE HYDROCHLORIDE 0.4 MG/ML
80 INJECTION, SOLUTION INTRAMUSCULAR; INTRAVENOUS; SUBCUTANEOUS AS NEEDED
Status: DISCONTINUED | OUTPATIENT
Start: 2021-04-30 | End: 2021-04-30

## 2021-04-30 RX ORDER — HYDROMORPHONE HYDROCHLORIDE 1 MG/ML
0.4 INJECTION, SOLUTION INTRAMUSCULAR; INTRAVENOUS; SUBCUTANEOUS EVERY 5 MIN PRN
Status: DISCONTINUED | OUTPATIENT
Start: 2021-04-30 | End: 2021-04-30

## 2021-04-30 RX ORDER — LIDOCAINE HYDROCHLORIDE 10 MG/ML
INJECTION, SOLUTION EPIDURAL; INFILTRATION; INTRACAUDAL; PERINEURAL AS NEEDED
Status: DISCONTINUED | OUTPATIENT
Start: 2021-04-30 | End: 2021-04-30 | Stop reason: SURG

## 2021-04-30 RX ORDER — HYDROCODONE BITARTRATE AND ACETAMINOPHEN 5; 325 MG/1; MG/1
1 TABLET ORAL EVERY 6 HOURS PRN
Qty: 20 TABLET | Refills: 0 | Status: SHIPPED | OUTPATIENT
Start: 2021-04-30 | End: 2021-07-29

## 2021-04-30 RX ORDER — ACETAMINOPHEN 500 MG
1000 TABLET ORAL ONCE
Status: DISCONTINUED | OUTPATIENT
Start: 2021-04-30 | End: 2021-04-30

## 2021-04-30 RX ORDER — NEOSTIGMINE METHYLSULFATE 1 MG/ML
INJECTION INTRAVENOUS AS NEEDED
Status: DISCONTINUED | OUTPATIENT
Start: 2021-04-30 | End: 2021-04-30 | Stop reason: SURG

## 2021-04-30 RX ADMIN — GLYCOPYRROLATE 0.4 MG: 0.2 INJECTION, SOLUTION INTRAMUSCULAR; INTRAVENOUS at 08:49:00

## 2021-04-30 RX ADMIN — LIDOCAINE HYDROCHLORIDE 25 MG: 10 INJECTION, SOLUTION EPIDURAL; INFILTRATION; INTRACAUDAL; PERINEURAL at 07:36:00

## 2021-04-30 RX ADMIN — NEOSTIGMINE METHYLSULFATE 4 MG: 1 INJECTION INTRAVENOUS at 08:49:00

## 2021-04-30 RX ADMIN — ROCURONIUM BROMIDE 20 MG: 10 INJECTION, SOLUTION INTRAVENOUS at 08:03:00

## 2021-04-30 RX ADMIN — SODIUM CHLORIDE, SODIUM LACTATE, POTASSIUM CHLORIDE, CALCIUM CHLORIDE: 600; 310; 30; 20 INJECTION, SOLUTION INTRAVENOUS at 09:02:00

## 2021-04-30 RX ADMIN — ROCURONIUM BROMIDE 30 MG: 10 INJECTION, SOLUTION INTRAVENOUS at 07:46:00

## 2021-04-30 NOTE — ANESTHESIA PREPROCEDURE EVALUATION
PRE-OP EVALUATION    Patient Name: Eleno Flores    Admit Diagnosis: Calculus of gallbladder with acute on chronic cholecystitis without obstruction [K80.12]    Pre-op Diagnosis: Calculus of gallbladder with acute on chronic cholecystitis without obstruct UNDER THE SKIN 3 TIMES A DAY AS DIRECTED (Patient taking differently: Pt stated he adjusts his insulin depending on BS and carbohydrates consumed. ), Disp: 30 pen, Rfl: 5, 4/30/2021 at 0300  METFORMIN HCL  MG Oral Tablet 24 Hr, TAKE 2 TABLETS BY MOUT AORTA REPLACEMENT;  Surgeon: Evelina Ng MD;  Location: [facility] CVOR   • COLONOSCOPY     • COLONOSCOPY N/A 1/8/2018    Procedure: COLONOSCOPY;  Surgeon: Umberto Barr MD;  Location: [facility] ENDOSCOPY   • LITHOTRIPSY Right 09/24/2020    right ESWL with right R

## 2021-04-30 NOTE — PROGRESS NOTES
Millie Wilkins is a 55year old male referred by [facility] emergency department for evaluation of cholelithiasis.     The patient states he presented to [facility] emergency department on 4/11/2021 for evaluation of abdominal pain.   Patient stat without mention of complication, not stated as uncontrolled              Past Surgical History:   Procedure Laterality Date   • ANGIOGRAM   FALL 2013   • AORTIC ANEURYSM 5-5.4CM DIAM       • APPENDECTOMY   1/2006   • CABG       • CARDIAC CATHETERIZATION   MINI U/F) 31G X 5 MM Does not apply Misc, USE WITH INSULIN AS DIRECTED  Continuous Blood Gluc  (DEXCOM G6 ) Does not apply Device, 1 Device by Does not apply route continuous.   Probiotic Product (PROBIOTIC-10) Oral Cap, Take 1 capsule by mo Head: Normocephalic and atraumatic. Eyes:      Extraocular Movements: Extraocular movements intact. Pupils: Pupils are equal, round, and reactive to light. Cardiovascular:      Rate and Rhythm: Normal rate and regular rhythm.    Pulmonary:      Eff

## 2021-04-30 NOTE — ANESTHESIA PROCEDURE NOTES
Airway  Urgency: elective      General Information and Staff    Patient location during procedure: OR  Anesthesiologist: Rachel Gloria DO  Performed: anesthesiologist     Indications and Patient Condition  Indications for airway management: anesthesia  Sedat

## 2021-04-30 NOTE — ANESTHESIA POSTPROCEDURE EVALUATION
[address] Patient Status:  Hospital Outpatient Surgery   Age/Gender 55year old male MRN [id number]   [facility] SURGERY Attending [facility] Officer, [facility] Day # 0 PCP Marianna Raphael MD       Anesthesia Post-op Note

## 2021-04-30 NOTE — OPERATIVE REPORT
[facility]  Operative Note     Divya Muckle Location: OR   CSN [id number] MRN [id number]   Admission Date 4/30/2021 Operation Date 4/30/2021   Attending Physician Jaycee Mendiola DO Operating Physician Bruna Mckenna DO      Preoperative Diagnosis: Ch guidewire was passed into the cystic duct followed by a ureteral catheter. This was followed by C-arm cholangiography demonstrating normal intra-and extrahepatic biliary ductal anatomy with easy flow of contrast into the duodenum.   Cholangiography was ter

## 2021-05-03 NOTE — H&P
The patient states he presented to [facility] emergency department on 4/11/2021 for evaluation of abdominal pain.   Patient states that on the evening prior to his admission he developed a sudden onset of right upper quadrant abdominal pain that radiat ANGIOGRAM   FALL 2013   • AORTIC ANEURYSM 5-5.4CM DIAM       • APPENDECTOMY   1/2006   • CABG       • CARDIAC CATHETERIZATION   12/2/2013     Procedure: HEART ASCENDING AORTA REPLACEMENT;  Surgeon: Dmitriy Gaxiola MD;  Location: [facility] CVOR   • COLONOSCOPY     not apply Device, 1 Device by Does not apply route continuous. Probiotic Product (PROBIOTIC-10) Oral Cap, Take 1 capsule by mouth daily. Magnesium 100 MG Oral Tab, Take  by mouth. Omega-3 Fatty Acids (FISH OIL) 435 MG Oral Cap, Take  by mouth.   B Comple round, and reactive to light. Cardiovascular:      Rate and Rhythm: Normal rate and regular rhythm. Pulmonary:      Effort: Pulmonary effort is normal.      Breath sounds: Normal breath sounds.    Abdominal:      General: Bowel sounds are normal. There

## 2021-05-05 DIAGNOSIS — E10.8 CONTROLLED DIABETES MELLITUS TYPE 1 WITH COMPLICATIONS (HCC): ICD-10-CM

## 2021-05-05 RX ORDER — BLOOD-GLUCOSE TRANSMITTER
EACH MISCELLANEOUS
Qty: 1 EACH | Refills: 3 | Status: SHIPPED | OUTPATIENT
Start: 2021-05-05

## 2021-05-11 ENCOUNTER — LAB ENCOUNTER (OUTPATIENT)
Dept: LAB | Age: 56
End: 2021-05-11
Attending: UROLOGY
Payer: COMMERCIAL

## 2021-05-11 DIAGNOSIS — R82.993 HYPERURICOSURIA: ICD-10-CM

## 2021-05-11 DIAGNOSIS — R82.994 HYPERCALCIURIA: ICD-10-CM

## 2021-05-11 DIAGNOSIS — N20.0 KIDNEY STONE: ICD-10-CM

## 2021-05-11 PROCEDURE — 82340 ASSAY OF CALCIUM IN URINE: CPT

## 2021-05-11 PROCEDURE — 82436 ASSAY OF URINE CHLORIDE: CPT

## 2021-05-11 PROCEDURE — 82507 ASSAY OF CITRATE: CPT

## 2021-05-11 PROCEDURE — 83945 ASSAY OF OXALATE: CPT

## 2021-05-11 PROCEDURE — 84392 ASSAY OF URINE SULFATE: CPT

## 2021-05-19 NOTE — PROGRESS NOTES
Ca is much better but chlorthalidone is not on the med list anymore/ messaged to clarify with Julianna Hernandez and rec a routine fu

## 2021-06-02 RX ORDER — SODIUM CHLORIDE, SODIUM LACTATE, POTASSIUM CHLORIDE, CALCIUM CHLORIDE 600; 310; 30; 20 MG/100ML; MG/100ML; MG/100ML; MG/100ML
INJECTION, SOLUTION INTRAVENOUS CONTINUOUS
Status: CANCELLED | OUTPATIENT
Start: 2021-06-02

## 2021-06-17 ENCOUNTER — LAB ENCOUNTER (OUTPATIENT)
Dept: LAB | Age: 56
End: 2021-06-17
Attending: FAMILY MEDICINE
Payer: COMMERCIAL

## 2021-06-17 DIAGNOSIS — Z86.010 PERSONAL HISTORY OF COLONIC POLYPS: Primary | ICD-10-CM

## 2021-06-18 ENCOUNTER — HOSPITAL ENCOUNTER (OUTPATIENT)
Facility: HOSPITAL | Age: 56
Setting detail: HOSPITAL OUTPATIENT SURGERY
Discharge: HOME | End: 2021-06-18
Attending: SURGERY | Admitting: SURGERY
Payer: COMMERCIAL

## 2021-06-18 ENCOUNTER — ANESTHESIA (OUTPATIENT)
Dept: ENDOSCOPY | Facility: HOSPITAL | Age: 56
End: 2021-06-18
Payer: COMMERCIAL

## 2021-06-18 ENCOUNTER — ANESTHESIA EVENT (OUTPATIENT)
Dept: ENDOSCOPY | Facility: HOSPITAL | Age: 56
End: 2021-06-18
Payer: COMMERCIAL

## 2021-06-18 VITALS
OXYGEN SATURATION: 98 % | TEMPERATURE: 98 F | HEIGHT: 70 IN | DIASTOLIC BLOOD PRESSURE: 76 MMHG | WEIGHT: 191.38 LBS | BODY MASS INDEX: 27.4 KG/M2 | RESPIRATION RATE: 20 BRPM | HEART RATE: 62 BPM | SYSTOLIC BLOOD PRESSURE: 110 MMHG

## 2021-06-18 DIAGNOSIS — Z86.010 PERSONAL HISTORY OF COLONIC POLYPS: Primary | ICD-10-CM

## 2021-06-18 PROCEDURE — 88305 TISSUE EXAM BY PATHOLOGIST: CPT | Performed by: SURGERY

## 2021-06-18 PROCEDURE — 3E0H8KZ INTRODUCTION OF OTHER DIAGNOSTIC SUBSTANCE INTO LOWER GI, VIA NATURAL OR ARTIFICIAL OPENING ENDOSCOPIC: ICD-10-PCS | Performed by: SURGERY

## 2021-06-18 PROCEDURE — 0DBH8ZX EXCISION OF CECUM, VIA NATURAL OR ARTIFICIAL OPENING ENDOSCOPIC, DIAGNOSTIC: ICD-10-PCS | Performed by: SURGERY

## 2021-06-18 RX ORDER — DEXTROSE MONOHYDRATE 25 G/50ML
50 INJECTION, SOLUTION INTRAVENOUS
Status: DISCONTINUED | OUTPATIENT
Start: 2021-06-18 | End: 2021-06-18

## 2021-06-18 RX ORDER — SODIUM CHLORIDE, SODIUM LACTATE, POTASSIUM CHLORIDE, CALCIUM CHLORIDE 600; 310; 30; 20 MG/100ML; MG/100ML; MG/100ML; MG/100ML
INJECTION, SOLUTION INTRAVENOUS CONTINUOUS
Status: DISCONTINUED | OUTPATIENT
Start: 2021-06-18 | End: 2021-06-18

## 2021-06-18 RX ORDER — NALOXONE HYDROCHLORIDE 0.4 MG/ML
80 INJECTION, SOLUTION INTRAMUSCULAR; INTRAVENOUS; SUBCUTANEOUS AS NEEDED
Status: DISCONTINUED | OUTPATIENT
Start: 2021-06-18 | End: 2021-06-18

## 2021-06-18 RX ADMIN — SODIUM CHLORIDE, SODIUM LACTATE, POTASSIUM CHLORIDE, CALCIUM CHLORIDE: 600; 310; 30; 20 INJECTION, SOLUTION INTRAVENOUS at 10:46:00

## 2021-06-18 RX ADMIN — SODIUM CHLORIDE, SODIUM LACTATE, POTASSIUM CHLORIDE, CALCIUM CHLORIDE: 600; 310; 30; 20 INJECTION, SOLUTION INTRAVENOUS at 11:24:00

## 2021-06-18 NOTE — ANESTHESIA POSTPROCEDURE EVALUATION
[address] Patient Status:  Hospital Outpatient Surgery   Age/Gender 55year old male MRN [id number]   Location [id number] [place] [address] [facility] Attending Nehemias Guevara DO   Hosp Day # 0 PCP Shelton Winter MD       Anesthesia Po

## 2021-06-18 NOTE — ANESTHESIA PREPROCEDURE EVALUATION
PRE-OP EVALUATION    Patient Name: Zac Farah    Admit Diagnosis: Personal history of colonic polyps [Z86.010]    Pre-op Diagnosis: Personal history of colonic polyps [Z86.010]    COLONOSCOPY    Anesthesia Procedure: COLONOSCOPY (N/A )    Surgeon(s) an UNDER THE SKIN 3 TIMES A DAY AS DIRECTED (Patient taking differently: Pt stated he adjusts his insulin depending on BS and carbohydrates consumed. ), Disp: 30 pen, Rfl: 5  METFORMIN HCL  MG Oral Tablet 24 Hr, TAKE 2 TABLETS BY MOUTH TWICE A DAY WITH ASCENDING AORTA REPLACEMENT;  Surgeon: Kailee Quigley MD;  Location: [facility] CVOR   • CHOLECYSTECTOMY  04/30/2021   • COLONOSCOPY     • COLONOSCOPY N/A 1/8/2018    Procedure: COLONOSCOPY;  Surgeon: Nasim Coppola MD;  Location: [facility] ENDOSCOPY   • LITHOTRIPSY R

## 2021-06-18 NOTE — H&P
The patient reports that he is also to be scheduled for a colonoscopy.  The patient states that his last colonoscopy was in 2018 with findings of a polyp.  Pathology showed tubular adenoma. Eugenia Aguirre states he was recommended to have repeat colonoscopy in 3 year Yes      Alcohol/week: 0.0 standard drinks      Comment: SOCIAL    Drug use:  No     insulin detemir (LEVEMIR FLEXTOUCH) 100 UNIT/ML Subcutaneous Solution Pen-injector, INJECT 30-35 UNITS UNDER THE SKIN EVERY MORNING AND EVERY EVENING  VALSARTAN 160 MG Oral for apnea, cough, shortness of breath and wheezing.    Cardiovascular: Negative for chest pain, palpitations and leg swelling. Gastrointestinal: Positive for abdominal pain, nausea and vomiting.  Negative for abdominal distention, anal bleeding, blood in

## 2021-06-19 NOTE — OPERATIVE REPORT
[facility]    PATIENT'S NAME: Trinidad CorettaTARAN hunt   ATTENDING PHYSICIAN: Yamel Fajardo D.O.   OPERATING PHYSICIAN: Yamel Fajardo D.O.   PATIENT ACCOUNT#:   [de-identified]    LOCATION:  [facility] ENDO POOL ROOMS 6 EDWP 10  MEDICAL RECORD #:   [id number]       DA evidence of mucosal disease, masses, polypoid lesions, AVMs, or other abnormalities. Within the transverse colon, there was a location which appeared to be prior polypectomy site as a blue tattoo was seen at this location.   There was no evidence of recurr

## 2021-06-23 ENCOUNTER — TELEPHONE (OUTPATIENT)
Dept: SURGERY | Facility: CLINIC | Age: 56
End: 2021-06-23

## 2021-06-23 DIAGNOSIS — Z86.010 PERSONAL HISTORY OF COLONIC POLYPS: Primary | ICD-10-CM

## 2021-06-25 ENCOUNTER — NURSE ONLY (OUTPATIENT)
Dept: ENDOCRINOLOGY CLINIC | Facility: CLINIC | Age: 56
End: 2021-06-25
Payer: COMMERCIAL

## 2021-06-25 DIAGNOSIS — E10.8 CONTROLLED DIABETES MELLITUS TYPE 1 WITH COMPLICATIONS (HCC): ICD-10-CM

## 2021-06-25 PROCEDURE — G0108 DIAB MANAGE TRN  PER INDIV: HCPCS | Performed by: DIETITIAN, REGISTERED

## 2021-06-27 VITALS — BODY MASS INDEX: 28 KG/M2 | WEIGHT: 194.63 LBS

## 2021-06-28 NOTE — PROGRESS NOTES
Cyndy Arenas  BNO1[birth year]51/1207 was seen for Intro to Pump Education:    Date: 6/25/2021  Referring Provider: Dr. Henry Abdi  Start time: 12:30pm End time: 1:00pm    DM Dx:  Date - 2013    Type: DANI    SELF MANAGEMENT SKILLS:  What does pt. expect insulin pu

## 2021-07-21 ENCOUNTER — PATIENT OUTREACH (OUTPATIENT)
Dept: SURGERY | Facility: CLINIC | Age: 56
End: 2021-07-21

## 2021-07-21 NOTE — PROGRESS NOTES
Recall placed. Patient was already notified by Dr. Leno Ponce of 6 month recall. Patient currently scheduled for repeat colonoscopy on 12/20/2021.

## 2021-07-22 ENCOUNTER — PATIENT OUTREACH (OUTPATIENT)
Dept: SURGERY | Facility: CLINIC | Age: 56
End: 2021-07-22

## 2021-07-28 ENCOUNTER — LAB ENCOUNTER (OUTPATIENT)
Dept: LAB | Age: 56
End: 2021-07-28
Attending: FAMILY MEDICINE
Payer: COMMERCIAL

## 2021-07-28 DIAGNOSIS — E10.8 CONTROLLED DIABETES MELLITUS TYPE 1 WITH COMPLICATIONS (HCC): ICD-10-CM

## 2021-07-28 LAB
ALBUMIN SERPL-MCNC: 4 G/DL (ref 3.4–5)
ALBUMIN/GLOB SERPL: 1.2 {RATIO} (ref 1–2)
ALP LIVER SERPL-CCNC: 96 U/L
ALT SERPL-CCNC: 43 U/L
ANION GAP SERPL CALC-SCNC: 6 MMOL/L (ref 0–18)
AST SERPL-CCNC: 20 U/L (ref 15–37)
BILIRUB SERPL-MCNC: 0.8 MG/DL (ref 0.1–2)
BUN BLD-MCNC: 16 MG/DL (ref 7–18)
BUN/CREAT SERPL: 15.8 (ref 10–20)
CALCIUM BLD-MCNC: 9.5 MG/DL (ref 8.5–10.1)
CHLORIDE SERPL-SCNC: 103 MMOL/L (ref 98–112)
CHOLEST SMN-MCNC: 210 MG/DL (ref ?–200)
CO2 SERPL-SCNC: 28 MMOL/L (ref 21–32)
CREAT BLD-MCNC: 1.01 MG/DL
CREAT UR-SCNC: 166 MG/DL
EST. AVERAGE GLUCOSE BLD GHB EST-MCNC: 232 MG/DL (ref 68–126)
GLOBULIN PLAS-MCNC: 3.4 G/DL (ref 2.8–4.4)
GLUCOSE BLD-MCNC: 122 MG/DL (ref 70–99)
HBA1C MFR BLD HPLC: 9.7 % (ref ?–5.7)
HDLC SERPL-MCNC: 42 MG/DL (ref 40–59)
LDLC SERPL CALC-MCNC: 145 MG/DL (ref ?–100)
M PROTEIN MFR SERPL ELPH: 7.4 G/DL (ref 6.4–8.2)
MICROALBUMIN UR-MCNC: 9.63 MG/DL
MICROALBUMIN/CREAT 24H UR-RTO: 58 UG/MG (ref ?–30)
NONHDLC SERPL-MCNC: 168 MG/DL (ref ?–130)
OSMOLALITY SERPL CALC.SUM OF ELEC: 286 MOSM/KG (ref 275–295)
PATIENT FASTING Y/N/NP: YES
PATIENT FASTING Y/N/NP: YES
POTASSIUM SERPL-SCNC: 3.7 MMOL/L (ref 3.5–5.1)
SODIUM SERPL-SCNC: 137 MMOL/L (ref 136–145)
TRIGL SERPL-MCNC: 128 MG/DL (ref 30–149)
VLDLC SERPL CALC-MCNC: 24 MG/DL (ref 0–30)

## 2021-07-28 PROCEDURE — 80053 COMPREHEN METABOLIC PANEL: CPT | Performed by: FAMILY MEDICINE

## 2021-07-28 PROCEDURE — 83036 HEMOGLOBIN GLYCOSYLATED A1C: CPT | Performed by: FAMILY MEDICINE

## 2021-07-28 PROCEDURE — 3061F NEG MICROALBUMINURIA REV: CPT | Performed by: FAMILY MEDICINE

## 2021-07-28 PROCEDURE — 82570 ASSAY OF URINE CREATININE: CPT

## 2021-07-28 PROCEDURE — 3060F POS MICROALBUMINURIA REV: CPT | Performed by: FAMILY MEDICINE

## 2021-07-28 PROCEDURE — 82043 UR ALBUMIN QUANTITATIVE: CPT

## 2021-07-28 PROCEDURE — 80061 LIPID PANEL: CPT | Performed by: FAMILY MEDICINE

## 2021-07-28 PROCEDURE — 36415 COLL VENOUS BLD VENIPUNCTURE: CPT | Performed by: FAMILY MEDICINE

## 2021-07-28 PROCEDURE — 3046F HEMOGLOBIN A1C LEVEL >9.0%: CPT | Performed by: FAMILY MEDICINE

## 2021-07-30 ENCOUNTER — OFFICE VISIT (OUTPATIENT)
Dept: FAMILY MEDICINE CLINIC | Facility: CLINIC | Age: 56
End: 2021-07-30
Payer: COMMERCIAL

## 2021-07-30 VITALS
BODY MASS INDEX: 28.2 KG/M2 | RESPIRATION RATE: 16 BRPM | HEART RATE: 78 BPM | HEIGHT: 70 IN | WEIGHT: 197 LBS | SYSTOLIC BLOOD PRESSURE: 126 MMHG | DIASTOLIC BLOOD PRESSURE: 84 MMHG | OXYGEN SATURATION: 98 %

## 2021-07-30 DIAGNOSIS — E10.9 TYPE 1 DIABETES MELLITUS WITHOUT COMPLICATION (HCC): Primary | ICD-10-CM

## 2021-07-30 PROCEDURE — 99213 OFFICE O/P EST LOW 20 MIN: CPT | Performed by: FAMILY MEDICINE

## 2021-07-30 PROCEDURE — 3074F SYST BP LT 130 MM HG: CPT | Performed by: FAMILY MEDICINE

## 2021-07-30 PROCEDURE — 3079F DIAST BP 80-89 MM HG: CPT | Performed by: FAMILY MEDICINE

## 2021-07-30 PROCEDURE — 3008F BODY MASS INDEX DOCD: CPT | Performed by: FAMILY MEDICINE

## 2021-07-30 NOTE — PROGRESS NOTES
[facility] Progress Note    SUBJECTIVE: Zac Farah 55year old male is here today for Patient presents with:  Diabetes      Feels fine, blood sugar has been up. Had a colonoscopy and cholecystectomy since we last met and went fine.     Feels l OTHER SURGICAL HISTORY  4/2013    staph infection        Social Hx:  No changes, busy     ROS  Constitutional: No fevers, chills or night sweats  Cardio: No sob or chest pain, no palpitations  Respiratory: No cough, no sob, or pain on breathing  GI: No [place] Probiotic Product (PROBIOTIC-10) Oral Cap Take 1 capsule by mouth daily. • Magnesium 100 MG Oral Tab Take by mouth daily. • Omega-3 Fatty Acids (FISH OIL) 435 MG Oral Cap Take by mouth daily.        • B Complex Vitamins (VITAMIN B COMPLEX) Oral Ta

## 2021-08-03 ENCOUNTER — OFFICE VISIT (OUTPATIENT)
Dept: ENDOCRINOLOGY CLINIC | Facility: CLINIC | Age: 56
End: 2021-08-03
Payer: COMMERCIAL

## 2021-08-03 VITALS
SYSTOLIC BLOOD PRESSURE: 120 MMHG | DIASTOLIC BLOOD PRESSURE: 68 MMHG | RESPIRATION RATE: 16 BRPM | WEIGHT: 198 LBS | HEART RATE: 88 BPM | BODY MASS INDEX: 28.35 KG/M2 | HEIGHT: 70 IN

## 2021-08-03 DIAGNOSIS — I10 ESSENTIAL HYPERTENSION: ICD-10-CM

## 2021-08-03 DIAGNOSIS — E10.65 TYPE 1 DIABETES MELLITUS WITH HYPERGLYCEMIA (HCC): Primary | ICD-10-CM

## 2021-08-03 DIAGNOSIS — E78.2 MIXED HYPERLIPIDEMIA: ICD-10-CM

## 2021-08-03 PROCEDURE — 3074F SYST BP LT 130 MM HG: CPT | Performed by: NURSE PRACTITIONER

## 2021-08-03 PROCEDURE — 95251 CONT GLUC MNTR ANALYSIS I&R: CPT | Performed by: NURSE PRACTITIONER

## 2021-08-03 PROCEDURE — 3078F DIAST BP <80 MM HG: CPT | Performed by: NURSE PRACTITIONER

## 2021-08-03 PROCEDURE — 3008F BODY MASS INDEX DOCD: CPT | Performed by: NURSE PRACTITIONER

## 2021-08-03 PROCEDURE — 99215 OFFICE O/P EST HI 40 MIN: CPT | Performed by: NURSE PRACTITIONER

## 2021-08-03 RX ORDER — SEMAGLUTIDE 1.34 MG/ML
0.25 INJECTION, SOLUTION SUBCUTANEOUS
Qty: 1.5 ML | Refills: 0 | COMMUNITY
Start: 2021-08-03 | End: 2021-08-30 | Stop reason: DRUGHIGH

## 2021-08-03 RX ORDER — CHLORTHALIDONE 25 MG/1
TABLET ORAL
COMMUNITY
Start: 2021-07-30

## 2021-08-03 RX ORDER — BLOOD-GLUCOSE SENSOR
EACH MISCELLANEOUS
COMMUNITY
Start: 2021-07-31 | End: 2021-10-25

## 2021-08-03 RX ORDER — INSULIN DEGLUDEC 200 U/ML
54 INJECTION, SOLUTION SUBCUTANEOUS DAILY
Qty: 6 ML | Refills: 0 | COMMUNITY
Start: 2021-08-03 | End: 2021-08-12 | Stop reason: SDUPTHER

## 2021-08-03 NOTE — PROGRESS NOTES
HPI:   Leno Humphrey is a 55year old male who presents for initial visit with provider for the management of his diabetes. Patient interested in insulin pump therapy.      Patient presents with:  Diabetes: new patient diabetes management Dexcom     Patie aortic aneurysm ([facility] [address] Back problem, Cardiovascular function study, abnormal, Coronary atherosclerosis, Essential hypertension (8/3/2021), Gall stone, High blood pressure, High cholesterol, Kidney stone (7/29/2020), and Type I (juvenile type) diabetes alycia Vitamins (VITAMIN B COMPLEX) Oral Tab, Take by mouth daily. aspirin 81 MG Oral Chew Tab, Chew 81 mg by mouth daily. Take 4 am of procedure    No current facility-administered medications on file prior to visit.       CMP  (most recent labs)   Lab Results Physical Exam  Vitals reviewed. Pulmonary:      Effort: Pulmonary effort is normal.   Neurological:      Mental Status: He is alert and oriented to person, place, and time.    Psychiatric:         Mood and Affect: Mood normal.         Behavior: Behavior no significant medication side effects noted. PLAN: will continue present medications, reviewed diet, exercise and weight control     As for his cholesterol, Lipids are needs further observation, needs improvement, needs to follow diet more regularly. counseling and coordinating care related to their office visit.       Miley Beard APRN, BC-ADM, [facility]

## 2021-08-03 NOTE — PATIENT INSTRUCTIONS
We are here to support you with Diabetes but please remember that you still need your primary care doctor for your routine health maintenance. Your current A1C: 9.7%  This test provides us with your average blood sugar for the past 3 months.    The main g are not acceptable to reach your goal of improving diabetes      Health Maintenance:   1. LABS: It is important to monitor your kidney function (blood and urine protein levels) , liver function tests and cholesterol levels when you have diabetes.      2. FO

## 2021-08-04 PROCEDURE — 3046F HEMOGLOBIN A1C LEVEL >9.0%: CPT | Performed by: FAMILY MEDICINE

## 2021-08-05 LAB
ALBUMIN/GLOBULIN RATIO: 2 (CALC) (ref 1–2.5)
ALBUMIN: 4.5 G/DL (ref 3.6–5.1)
ALKALINE PHOSPHATASE: 82 U/L (ref 35–144)
ALT: 30 U/L (ref 9–46)
AST: 18 U/L (ref 10–35)
BILIRUBIN, TOTAL: 0.8 MG/DL (ref 0.2–1.2)
BUN/CREATININE RATIO: 29 (CALC) (ref 6–22)
BUN: 29 MG/DL (ref 7–25)
CALCIUM: 10.3 MG/DL (ref 8.6–10.3)
CARBON DIOXIDE: 30 MMOL/L (ref 20–32)
CHLORIDE: 102 MMOL/L (ref 98–110)
CHOL/HDLC RATIO: 4.5 (CALC)
CHOLESTEROL, TOTAL: 193 MG/DL
CREATININE: 0.99 MG/DL (ref 0.7–1.33)
EGFR IF AFRICN AM: 99 ML/MIN/1.73M2
EGFR IF NONAFRICN AM: 85 ML/MIN/1.73M2
GLOBULIN: 2.3 G/DL (CALC) (ref 1.9–3.7)
GLUCOSE: 132 MG/DL (ref 65–99)
HDL CHOLESTEROL: 43 MG/DL
HEMOGLOBIN A1C: 9.7 % OF TOTAL HGB
LDL-CHOLESTEROL: 129 MG/DL (CALC)
NON-HDL CHOLESTEROL: 150 MG/DL (CALC)
POTASSIUM: 3.9 MMOL/L (ref 3.5–5.3)
PROTEIN, TOTAL: 6.8 G/DL (ref 6.1–8.1)
SODIUM: 139 MMOL/L (ref 135–146)
TRIGLYCERIDES: 106 MG/DL

## 2021-08-12 ENCOUNTER — PATIENT MESSAGE (OUTPATIENT)
Dept: ENDOCRINOLOGY CLINIC | Facility: CLINIC | Age: 56
End: 2021-08-12

## 2021-08-12 DIAGNOSIS — E10.65 TYPE 1 DIABETES MELLITUS WITH HYPERGLYCEMIA (HCC): Primary | ICD-10-CM

## 2021-08-12 RX ORDER — INSULIN DEGLUDEC 200 U/ML
54 INJECTION, SOLUTION SUBCUTANEOUS DAILY
Qty: 9 ML | Refills: 1 | Status: SHIPPED | OUTPATIENT
Start: 2021-08-12 | End: 2021-08-30

## 2021-08-12 NOTE — TELEPHONE ENCOUNTER
From: Chaim Mariscal  To: PALMIRA Zamudio  Sent: 8/12/2021 11:41 AM CDT  Subject: Non-Urgent Medical Question    George Cardoza,     You should see my blood test results shortly (took them yesterday after they did the incorrect tests last week).  I will be tra

## 2021-08-17 LAB
GLUCOSE: 119 MG/DL (ref 65–99)
GLUTAMIC ACID DECARBOXYLASE 65 AB: <5 IU/ML
ISLET CELL ANTIBODY SCREEN: NEGATIVE
NORTRIPTYLINE: <5 MCG/L (ref 50–150)

## 2021-08-18 ENCOUNTER — TELEPHONE (OUTPATIENT)
Dept: ENDOCRINOLOGY CLINIC | Facility: CLINIC | Age: 56
End: 2021-08-18

## 2021-08-18 NOTE — TELEPHONE ENCOUNTER
[facility] [place], APRN since incorrect lab was drawn and they did not draw c-peptide. Kenny Ospina @ [facility] will add c-peptide. Since this is day 7, there is a possibility they will need a new specimen.   Kenny Ospina will call our office tomorrow, i

## 2021-08-21 LAB — C-PEPTIDE: 0.18 NG/ML (ref 0.8–3.85)

## 2021-08-30 ENCOUNTER — OFFICE VISIT (OUTPATIENT)
Dept: ENDOCRINOLOGY CLINIC | Facility: CLINIC | Age: 56
End: 2021-08-30
Payer: COMMERCIAL

## 2021-08-30 VITALS
WEIGHT: 194 LBS | DIASTOLIC BLOOD PRESSURE: 74 MMHG | BODY MASS INDEX: 27.77 KG/M2 | HEART RATE: 90 BPM | SYSTOLIC BLOOD PRESSURE: 112 MMHG | HEIGHT: 70 IN | RESPIRATION RATE: 16 BRPM

## 2021-08-30 DIAGNOSIS — E10.65 TYPE 1 DIABETES MELLITUS WITH HYPERGLYCEMIA (HCC): Primary | ICD-10-CM

## 2021-08-30 PROCEDURE — 3074F SYST BP LT 130 MM HG: CPT | Performed by: NURSE PRACTITIONER

## 2021-08-30 PROCEDURE — 99214 OFFICE O/P EST MOD 30 MIN: CPT | Performed by: NURSE PRACTITIONER

## 2021-08-30 PROCEDURE — 3078F DIAST BP <80 MM HG: CPT | Performed by: NURSE PRACTITIONER

## 2021-08-30 PROCEDURE — 3008F BODY MASS INDEX DOCD: CPT | Performed by: NURSE PRACTITIONER

## 2021-08-30 PROCEDURE — 95251 CONT GLUC MNTR ANALYSIS I&R: CPT | Performed by: NURSE PRACTITIONER

## 2021-08-30 RX ORDER — INSULIN LISPRO 100 [IU]/ML
INJECTION, SOLUTION INTRAVENOUS; SUBCUTANEOUS
Qty: 15 ML | Refills: 0 | COMMUNITY
Start: 2021-08-30 | End: 2021-11-08 | Stop reason: ALTCHOICE

## 2021-08-30 RX ORDER — SEMAGLUTIDE 1.34 MG/ML
0.5 INJECTION, SOLUTION SUBCUTANEOUS
Qty: 1.5 ML | Refills: 1 | Status: SHIPPED | OUTPATIENT
Start: 2021-08-30 | End: 2021-09-13

## 2021-08-30 RX ORDER — METFORMIN HYDROCHLORIDE 500 MG/1
500 TABLET, EXTENDED RELEASE ORAL 2 TIMES DAILY
Qty: 90 TABLET | Refills: 1 | COMMUNITY
Start: 2021-08-30

## 2021-08-30 RX ORDER — INSULIN DEGLUDEC 200 U/ML
48 INJECTION, SOLUTION SUBCUTANEOUS DAILY
Qty: 12 ML | Refills: 1 | Status: SHIPPED | OUTPATIENT
Start: 2021-08-30 | End: 2021-09-13

## 2021-08-30 NOTE — PATIENT INSTRUCTIONS
We are here to support you with Diabetes but please remember that you still need your primary care doctor for your routine health maintenance. Your current A1C: 9.7%  This test provides us with your average blood sugar for the past 3 months.    The main g  (preferably < 110)  2 hours After meals: less than 180 (preferably less than 150)   Call for persistent blood sugars < 75 or > 200.    Blood sugars greater than 200 are not acceptable to reach your goal of improving diabetes      Health Maintenance:

## 2021-08-30 NOTE — PROGRESS NOTES
HPI:   Mario Del Rio is a 55year old male who presents for follow up for the management of his diabetes and medication evaluation.      Patient presents with:  Diabetes: follow up   Patient is using personal continuous glucose monitoring or would be testi (7/29/2020), and Type I (juvenile type) diabetes mellitus without mention of complication, not stated as uncontrolled.    His family history includes Dementia in his maternal grandmother; Diabetes in his father; Heart Disorder in his mother; High Cholestero 28 days.   [DISCONTINUED] METFORMIN HCL  MG Oral Tablet 24 Hr, TAKE 2 TABLETS BY MOUTH TWICE A DAY WITH MEALS (Patient taking differently: Take 500 mg by mouth daily with breakfast. Once daily )    No current facility-administered medications on file Physical Exam  Vitals reviewed. Pulmonary:      Effort: Pulmonary effort is normal.   Neurological:      Mental Status: He is alert and oriented to person, place, and time.    Psychiatric:         Mood and Affect: Mood normal.         Behavior: Behavior screen: PHQ-2 - Date of last depression screenin/3/2021  Patient  reports that he has never smoked. He has never used smokeless tobacco.  When is flu vaccine due? No recommendations at this time  When is pneumonia vaccine due?  No recommendations at [facility]

## 2021-09-13 ENCOUNTER — PATIENT MESSAGE (OUTPATIENT)
Dept: ENDOCRINOLOGY CLINIC | Facility: CLINIC | Age: 56
End: 2021-09-13

## 2021-09-13 DIAGNOSIS — E10.65 TYPE 1 DIABETES MELLITUS WITH HYPERGLYCEMIA (HCC): ICD-10-CM

## 2021-09-13 RX ORDER — INSULIN DEGLUDEC 200 U/ML
48 INJECTION, SOLUTION SUBCUTANEOUS DAILY
Qty: 21 ML | Refills: 1 | Status: SHIPPED | OUTPATIENT
Start: 2021-09-13 | End: 2021-10-08

## 2021-09-13 RX ORDER — SEMAGLUTIDE 1.34 MG/ML
0.5 INJECTION, SOLUTION SUBCUTANEOUS
Qty: 4.5 ML | Refills: 1 | Status: SHIPPED | OUTPATIENT
Start: 2021-09-13 | End: 2021-10-08 | Stop reason: DRUGHIGH

## 2021-09-13 NOTE — TELEPHONE ENCOUNTER
From: Ophelia Blankenship  To: PALMIRA Casas  Sent: 9/13/2021 12:49 PM CDT  Subject: Prescriptions    Hi Nani,  FRANCISCA didn't get the prescription for Tresiba and I only go one pen of Ozempic. Can you put in 90 prescriptions for both please.     Also, if you

## 2021-09-13 NOTE — TELEPHONE ENCOUNTER
Spoke with pt pharmacy, they did received tresiba u200 script. Insurance will allow a 90 day supply of both ozempic and tresiba u200, can we re-send for 90 day script per patient request?  Will pend if provider agrees.     LOV: 08/30/2021  Future Appointme

## 2021-10-08 ENCOUNTER — VIRTUAL PHONE E/M (OUTPATIENT)
Dept: ENDOCRINOLOGY CLINIC | Facility: CLINIC | Age: 56
End: 2021-10-08
Payer: COMMERCIAL

## 2021-10-08 DIAGNOSIS — R80.9 TYPE 2 DIABETES MELLITUS WITH MICROALBUMINURIA, WITH LONG-TERM CURRENT USE OF INSULIN (HCC): Primary | ICD-10-CM

## 2021-10-08 DIAGNOSIS — E11.29 TYPE 2 DIABETES MELLITUS WITH MICROALBUMINURIA, WITH LONG-TERM CURRENT USE OF INSULIN (HCC): Primary | ICD-10-CM

## 2021-10-08 DIAGNOSIS — Z79.4 TYPE 2 DIABETES MELLITUS WITH MICROALBUMINURIA, WITH LONG-TERM CURRENT USE OF INSULIN (HCC): Primary | ICD-10-CM

## 2021-10-08 PROCEDURE — 95251 CONT GLUC MNTR ANALYSIS I&R: CPT | Performed by: NURSE PRACTITIONER

## 2021-10-08 PROCEDURE — 99214 OFFICE O/P EST MOD 30 MIN: CPT | Performed by: NURSE PRACTITIONER

## 2021-10-08 RX ORDER — INSULIN DEGLUDEC 200 U/ML
38 INJECTION, SOLUTION SUBCUTANEOUS DAILY
Qty: 21 ML | Refills: 1 | COMMUNITY
Start: 2021-10-08 | End: 2021-12-14

## 2021-10-08 RX ORDER — SEMAGLUTIDE 1.34 MG/ML
1 INJECTION, SOLUTION SUBCUTANEOUS WEEKLY
Qty: 9 ML | Refills: 1 | Status: SHIPPED | OUTPATIENT
Start: 2021-10-08

## 2021-10-08 NOTE — PROGRESS NOTES
HPI:   Jaskaran Ornelas is a 55year old male for his management of diabetes. This visit is conducted using Telemedicine with live, interactive audio due to COVID-19. Patient verbally consents to Telemedicine visit.   Patient understands and accepts financ kg)  07/30/21 : 197 lb (89.4 kg)    BP Readings from Last 3 Encounters:  08/30/21 : 112/74  08/03/21 : 120/68  07/30/21 : 126/84         Past History:   He  has a past medical history of Ascending aortic aneurysm ([address] Back problem, Cardiovascular functio mouth nightly.  Replaces 5mg dose  VALSARTAN 160 MG Oral Tab, TAKE 1 TABLET BY MOUTH EVERY DAY  DEXCOM G6 TRANSMITTER Does not apply Misc, USE AS DIRECTED FOR 90 DAYS  Insulin Pen Needle (BD PEN NEEDLE MINI U/F) 31G X 5 MM Does not apply Misc, USE WITH INSU exertion  CARDIOVASCULAR: denies chest pain on exertion  GI: denies abdominal pain and denies heartburn  NEURO: denies headaches     EXAM:   Physical Exam  Pulmonary:      Comments: Able to speak in complete sentences without difficulty  Neurological: mellitus with microalbuminuria, with long-term current use of insulin (HCC)  -     Semaglutide, 1 MG/DOSE, (OZEMPIC, 1 MG/DOSE,) 4 MG/3ML Subcutaneous Solution Pen-injector; Inject 1 mg into the skin once a week.   -     GLUC MNTR CONT REC FROM NTRSTL TISS

## 2021-10-25 RX ORDER — BLOOD-GLUCOSE SENSOR
EACH MISCELLANEOUS
Qty: 3 EACH | Refills: 3 | Status: SHIPPED | OUTPATIENT
Start: 2021-10-25

## 2021-11-08 ENCOUNTER — OFFICE VISIT (OUTPATIENT)
Dept: ENDOCRINOLOGY CLINIC | Facility: CLINIC | Age: 56
End: 2021-11-08
Payer: COMMERCIAL

## 2021-11-08 VITALS
HEIGHT: 70 IN | BODY MASS INDEX: 27.2 KG/M2 | WEIGHT: 190 LBS | HEART RATE: 87 BPM | DIASTOLIC BLOOD PRESSURE: 76 MMHG | SYSTOLIC BLOOD PRESSURE: 122 MMHG | RESPIRATION RATE: 16 BRPM

## 2021-11-08 DIAGNOSIS — Z79.4 TYPE 2 DIABETES MELLITUS WITH MICROALBUMINURIA, WITH LONG-TERM CURRENT USE OF INSULIN (HCC): Primary | ICD-10-CM

## 2021-11-08 DIAGNOSIS — E11.29 TYPE 2 DIABETES MELLITUS WITH MICROALBUMINURIA, WITH LONG-TERM CURRENT USE OF INSULIN (HCC): Primary | ICD-10-CM

## 2021-11-08 DIAGNOSIS — I10 ESSENTIAL HYPERTENSION: ICD-10-CM

## 2021-11-08 DIAGNOSIS — E78.2 MIXED HYPERLIPIDEMIA: ICD-10-CM

## 2021-11-08 DIAGNOSIS — R80.9 TYPE 2 DIABETES MELLITUS WITH MICROALBUMINURIA, WITH LONG-TERM CURRENT USE OF INSULIN (HCC): Primary | ICD-10-CM

## 2021-11-08 PROCEDURE — 3074F SYST BP LT 130 MM HG: CPT | Performed by: NURSE PRACTITIONER

## 2021-11-08 PROCEDURE — 99214 OFFICE O/P EST MOD 30 MIN: CPT | Performed by: NURSE PRACTITIONER

## 2021-11-08 PROCEDURE — 95251 CONT GLUC MNTR ANALYSIS I&R: CPT | Performed by: NURSE PRACTITIONER

## 2021-11-08 PROCEDURE — 3078F DIAST BP <80 MM HG: CPT | Performed by: NURSE PRACTITIONER

## 2021-11-08 PROCEDURE — 3008F BODY MASS INDEX DOCD: CPT | Performed by: NURSE PRACTITIONER

## 2021-11-08 PROCEDURE — 3044F HG A1C LEVEL LT 7.0%: CPT | Performed by: FAMILY MEDICINE

## 2021-11-08 PROCEDURE — 83036 HEMOGLOBIN GLYCOSYLATED A1C: CPT | Performed by: NURSE PRACTITIONER

## 2021-11-08 NOTE — PROGRESS NOTES
HPI:   Chaim Mariscal is a 55year old male who presents for follow up for the management of his diabetes his diabetes.      Patient presents with:  Diabetes: follow up dexcom     Patient is using personal continuous glucose monitoring or would be testing B prescribed: rosuvastatin, Omega 3  SE: denies     Wt Readings from Last 3 Encounters:  11/08/21 : 190 lb (86.2 kg)  08/30/21 : 194 lb (88 kg)  08/03/21 : 198 lb (89.8 kg)    BP Readings from Last 3 Encounters:  11/08/21 : 122/76  08/30/21 : 112/74  08/03/2 dose  VALSARTAN 160 MG Oral Tab, TAKE 1 TABLET BY MOUTH EVERY DAY  DEXCOM G6 TRANSMITTER Does not apply Misc, USE AS DIRECTED FOR 90 DAYS  Magnesium 100 MG Oral Tab, Take by mouth daily.     Omega-3 Fatty Acids (FISH OIL) 435 MG Oral Cap, Take by mouth angelo pain and denies heartburn  NEURO: denies headaches and denies numbness and tingling to extremities    EXAM:   /76   Pulse 87   Resp 16   Ht 5' 10\" (1.778 m)   Wt 190 lb (86.2 kg)   BMI 27.26 kg/m²  Estimated body mass index is 27.26 kg/m² as calcula postprandial of one meal per day if not using CGM. Hypoglycemia S&S, Rx, and when to call APRN/CDE reviewed using Rule of 15's. Stressed need to call if 2 readings below 80 mg/dl in 1 week for medication adjustment.      Per ADA guidelines, in patients wit recommendations at this time    The patient indicates understanding of these issues and agrees to the plan. Refills addressed at time of office visit.     Diagnoses and all orders for this visit:    Type 2 diabetes mellitus with microalbuminuria, with long

## 2021-11-12 ENCOUNTER — OFFICE VISIT (OUTPATIENT)
Dept: FAMILY MEDICINE CLINIC | Facility: CLINIC | Age: 56
End: 2021-11-12
Payer: COMMERCIAL

## 2021-11-12 ENCOUNTER — PATIENT MESSAGE (OUTPATIENT)
Dept: ENDOCRINOLOGY CLINIC | Facility: CLINIC | Age: 56
End: 2021-11-12

## 2021-11-12 VITALS
HEART RATE: 102 BPM | HEIGHT: 70 IN | OXYGEN SATURATION: 98 % | BODY MASS INDEX: 27.06 KG/M2 | WEIGHT: 189 LBS | DIASTOLIC BLOOD PRESSURE: 86 MMHG | SYSTOLIC BLOOD PRESSURE: 116 MMHG | RESPIRATION RATE: 16 BRPM

## 2021-11-12 DIAGNOSIS — Z00.00 ANNUAL PHYSICAL EXAM: Primary | ICD-10-CM

## 2021-11-12 DIAGNOSIS — I10 ESSENTIAL HYPERTENSION: ICD-10-CM

## 2021-11-12 DIAGNOSIS — Z12.5 SCREENING FOR PROSTATE CANCER: ICD-10-CM

## 2021-11-12 DIAGNOSIS — E78.5 HYPERLIPIDEMIA WITH TARGET LDL LESS THAN 100: ICD-10-CM

## 2021-11-12 PROCEDURE — 90686 IIV4 VACC NO PRSV 0.5 ML IM: CPT | Performed by: FAMILY MEDICINE

## 2021-11-12 PROCEDURE — 3079F DIAST BP 80-89 MM HG: CPT | Performed by: FAMILY MEDICINE

## 2021-11-12 PROCEDURE — 3008F BODY MASS INDEX DOCD: CPT | Performed by: FAMILY MEDICINE

## 2021-11-12 PROCEDURE — 99396 PREV VISIT EST AGE 40-64: CPT | Performed by: FAMILY MEDICINE

## 2021-11-12 PROCEDURE — 90471 IMMUNIZATION ADMIN: CPT | Performed by: FAMILY MEDICINE

## 2021-11-12 PROCEDURE — 3074F SYST BP LT 130 MM HG: CPT | Performed by: FAMILY MEDICINE

## 2021-11-12 NOTE — PROGRESS NOTES
[facility] Progress Note    SUBJECTIVE: Vinicio Juares 55year old male is here today for Patient presents with:  Diabetes      Was told he was type I diabetes. However he is type II diabetes.     Hasn't needed the dexcom, but with changes in [facility] no rashes, or lumps  Vision/hearing: no changes  Genital/urinary: no changes or concerns  Musculoskeletal: No swelling in joints, no myalgia  Psych: no mood changes, or concerns      OBJECTIVE:  /86   Pulse 102   Resp 16   Ht 5' 10\" (1.778 m)   Wt 1 procedure           Assessment/Plan  Fartun  was seen today for diabetes.     Diagnoses and all orders for this visit:    Annual physical exam    Screening for prostate cancer  -     PSA, DIAGNOSTIC    Hyperlipidemia with target LDL less than 100  -     COMP M

## 2021-11-15 ENCOUNTER — PATIENT MESSAGE (OUTPATIENT)
Dept: ENDOCRINOLOGY CLINIC | Facility: CLINIC | Age: 56
End: 2021-11-15

## 2021-11-15 NOTE — TELEPHONE ENCOUNTER
From: Guido Garcia  To: PALMIRA Self  Sent: 11/15/2021 10:37 AM CST  Subject: Donald Da Silva Morning Nani,    I now remember why I wanted to get off of Valsartin. I started up again and the side effect hit me a couple of times this weekend.  Fro

## 2021-11-15 NOTE — TELEPHONE ENCOUNTER
Patient to speak with cardiologist however from diabetes standpoint any ACEI or ARB tx is renoprotective.

## 2021-11-17 ENCOUNTER — PATIENT MESSAGE (OUTPATIENT)
Dept: ENDOCRINOLOGY CLINIC | Facility: CLINIC | Age: 56
End: 2021-11-17

## 2021-11-17 ENCOUNTER — TELEPHONE (OUTPATIENT)
Dept: ENDOCRINOLOGY CLINIC | Facility: CLINIC | Age: 56
End: 2021-11-17

## 2021-11-18 ENCOUNTER — TELEPHONE (OUTPATIENT)
Dept: ENDOCRINOLOGY CLINIC | Facility: CLINIC | Age: 56
End: 2021-11-18

## 2021-11-18 NOTE — TELEPHONE ENCOUNTER
Pt states that [address]  told him to double up on the metformin. 1 in th am and 1 in the evening. He states his stomach can not tolerate it. He is taking once daily. None

## 2021-11-18 NOTE — TELEPHONE ENCOUNTER
Spoke with pt and explain that his insurance will not cover his tresiba because he has not tried or failed lantus or toujeo. In formed him if he does not need a refill now she will not send toujeo to the pharmacy unless he needs a refill.  Pt is to let [facility] k

## 2021-11-18 NOTE — TELEPHONE ENCOUNTER
From: Meghan Butt  To: PALMIRA Cruz  Sent: 11/17/2021 10:34 PM CST  Subject: Miloen Moris    So they are denying the preapproval. Unbelievable. Can you help in any way?  After years of failure, I finally have something that helps and they are scre

## 2021-11-19 ENCOUNTER — PATIENT MESSAGE (OUTPATIENT)
Dept: ENDOCRINOLOGY CLINIC | Facility: CLINIC | Age: 56
End: 2021-11-19

## 2021-11-29 ENCOUNTER — TELEPHONE (OUTPATIENT)
Dept: FAMILY MEDICINE CLINIC | Facility: CLINIC | Age: 56
End: 2021-11-29

## 2021-12-13 ENCOUNTER — PATIENT MESSAGE (OUTPATIENT)
Dept: ENDOCRINOLOGY CLINIC | Facility: CLINIC | Age: 56
End: 2021-12-13

## 2021-12-13 ENCOUNTER — TELEPHONE (OUTPATIENT)
Dept: SURGERY | Facility: CLINIC | Age: 56
End: 2021-12-13

## 2021-12-13 DIAGNOSIS — Z79.4 TYPE 2 DIABETES MELLITUS WITH MICROALBUMINURIA, WITH LONG-TERM CURRENT USE OF INSULIN (HCC): Primary | ICD-10-CM

## 2021-12-13 DIAGNOSIS — Z86.010 PERSONAL HISTORY OF COLONIC POLYPS: Primary | ICD-10-CM

## 2021-12-13 DIAGNOSIS — E11.29 TYPE 2 DIABETES MELLITUS WITH MICROALBUMINURIA, WITH LONG-TERM CURRENT USE OF INSULIN (HCC): Primary | ICD-10-CM

## 2021-12-13 DIAGNOSIS — R80.9 TYPE 2 DIABETES MELLITUS WITH MICROALBUMINURIA, WITH LONG-TERM CURRENT USE OF INSULIN (HCC): Primary | ICD-10-CM

## 2021-12-14 RX ORDER — INSULIN GLARGINE 300 U/ML
INJECTION, SOLUTION SUBCUTANEOUS
Qty: 12 ML | Refills: 0 | Status: SHIPPED | OUTPATIENT
Start: 2021-12-14

## 2021-12-14 RX ORDER — INSULIN DEGLUDEC 200 U/ML
38 INJECTION, SOLUTION SUBCUTANEOUS DAILY
Qty: 21 ML | Refills: 1 | Status: SHIPPED | OUTPATIENT
Start: 2021-12-14 | End: 2021-12-14

## 2021-12-14 NOTE — TELEPHONE ENCOUNTER
Contacted pharmacy. They have refill of OZempic available but can't be filled until 12/22/2021. They need new script of [place]. Will pend to provider.     LOV: 11/2021  Future Appointments   Date Time Provider Prema Ballesteros   2/7/2022 12:30 PM Lauren

## 2022-01-10 ENCOUNTER — PATIENT MESSAGE (OUTPATIENT)
Dept: ENDOCRINOLOGY CLINIC | Facility: CLINIC | Age: 57
End: 2022-01-10

## 2022-01-10 NOTE — TELEPHONE ENCOUNTER
From: Lore Henry  To: PALMIRA Morris  Sent: 1/10/2022 2:17 PM CST  Subject: Ozempic Needs PreAprroval    Unbelievably (or maybe not), [facility] is now denying Ozempic (needs pre-approval) . Guessing the same story as [facility].  You should have a note from CHELSEA

## 2022-01-20 ENCOUNTER — TELEPHONE (OUTPATIENT)
Dept: ENDOCRINOLOGY CLINIC | Facility: CLINIC | Age: 57
End: 2022-01-20

## 2022-01-20 NOTE — TELEPHONE ENCOUNTER
Denial received. States this \"denial decision is based on health plan criteria for ozempic.  This medication is cover only if pt has a history of suboptimals response, contraindication or intolerance to metformin\"

## 2022-01-20 NOTE — TELEPHONE ENCOUNTER
[facility] over [facility]  Ref# is [id number] representative name is Joana Byrne

## 2022-01-24 ENCOUNTER — PATIENT MESSAGE (OUTPATIENT)
Dept: ENDOCRINOLOGY CLINIC | Facility: CLINIC | Age: 57
End: 2022-01-24

## 2022-01-24 NOTE — TELEPHONE ENCOUNTER
Contacted [employer] to complete new PA for Ozempic. Approved through 01/24/2022. Pharmacy notified.   PA# [id number]

## 2022-01-24 NOTE — TELEPHONE ENCOUNTER
Please resubmit prior authorization, patient has tried and failed metformin immediate release, was not able to tolerate. Is currently on Metformin ER but unable to tolerate increased dose. Patient is tolerating Ozempic well with optimal response to medication with improved A1c and weight loss. Per ADA guidelines, in patients with type 2 diabetes and established ASCVD, incorporating agent proven to reduce major adverse CV events and CV mortality   GLP-1 Ozempic rx chosen since it not only lowers A1C, but studies have shown it can also reduce the risk of major CV events such as heart attack, stroke, or death in adults with type 2 diabetes who are currently treating their CV disease.   Lawence Letters APRN, BC-ADM, [facility]

## 2022-02-07 ENCOUNTER — OFFICE VISIT (OUTPATIENT)
Dept: ENDOCRINOLOGY CLINIC | Facility: CLINIC | Age: 57
End: 2022-02-07
Payer: COMMERCIAL

## 2022-02-07 VITALS
SYSTOLIC BLOOD PRESSURE: 116 MMHG | RESPIRATION RATE: 18 BRPM | BODY MASS INDEX: 26.92 KG/M2 | WEIGHT: 188 LBS | HEIGHT: 70 IN | HEART RATE: 79 BPM | DIASTOLIC BLOOD PRESSURE: 70 MMHG

## 2022-02-07 DIAGNOSIS — Z79.4 TYPE 2 DIABETES MELLITUS WITH MICROALBUMINURIA, WITH LONG-TERM CURRENT USE OF INSULIN (HCC): Primary | ICD-10-CM

## 2022-02-07 DIAGNOSIS — E78.2 MIXED HYPERLIPIDEMIA: ICD-10-CM

## 2022-02-07 DIAGNOSIS — E11.29 TYPE 2 DIABETES MELLITUS WITH MICROALBUMINURIA, WITH LONG-TERM CURRENT USE OF INSULIN (HCC): Primary | ICD-10-CM

## 2022-02-07 DIAGNOSIS — R80.9 TYPE 2 DIABETES MELLITUS WITH MICROALBUMINURIA, WITH LONG-TERM CURRENT USE OF INSULIN (HCC): Primary | ICD-10-CM

## 2022-02-07 DIAGNOSIS — I10 ESSENTIAL HYPERTENSION: ICD-10-CM

## 2022-02-07 LAB
CARTRIDGE LOT#: 877 NUMERIC
HEMOGLOBIN A1C: 7.5 % (ref 4.3–5.6)

## 2022-02-07 PROCEDURE — 83036 HEMOGLOBIN GLYCOSYLATED A1C: CPT | Performed by: NURSE PRACTITIONER

## 2022-02-07 PROCEDURE — 3008F BODY MASS INDEX DOCD: CPT | Performed by: NURSE PRACTITIONER

## 2022-02-07 PROCEDURE — 3074F SYST BP LT 130 MM HG: CPT | Performed by: NURSE PRACTITIONER

## 2022-02-07 PROCEDURE — 3078F DIAST BP <80 MM HG: CPT | Performed by: NURSE PRACTITIONER

## 2022-02-07 PROCEDURE — 99214 OFFICE O/P EST MOD 30 MIN: CPT | Performed by: NURSE PRACTITIONER

## 2022-02-07 RX ORDER — INSULIN LISPRO 100 [IU]/ML
INJECTION, SOLUTION INTRAVENOUS; SUBCUTANEOUS
Qty: 3 ML | Refills: 0 | COMMUNITY
Start: 2022-02-07

## 2022-02-07 RX ORDER — BLOOD-GLUCOSE TRANSMITTER
EACH MISCELLANEOUS
Qty: 1 EACH | Refills: 0 | COMMUNITY
Start: 2022-02-07

## 2022-02-07 RX ORDER — BLOOD-GLUCOSE SENSOR
EACH MISCELLANEOUS
Qty: 1 EACH | Refills: 0 | COMMUNITY
Start: 2022-02-07

## 2022-02-07 RX ORDER — INSULIN DEGLUDEC 200 U/ML
40 INJECTION, SOLUTION SUBCUTANEOUS DAILY
Qty: 18 ML | Refills: 1 | Status: SHIPPED | OUTPATIENT
Start: 2022-02-07

## 2022-02-10 ENCOUNTER — HOSPITAL ENCOUNTER (OUTPATIENT)
Dept: CV DIAGNOSTICS | Facility: HOSPITAL | Age: 57
Discharge: HOME | End: 2022-02-10
Attending: INTERNAL MEDICINE
Payer: COMMERCIAL

## 2022-02-10 DIAGNOSIS — I71.2 ASCENDING AORTIC ANEURYSM (HCC): ICD-10-CM

## 2022-02-10 PROCEDURE — 93306 TTE W/DOPPLER COMPLETE: CPT | Performed by: INTERNAL MEDICINE

## 2022-02-25 ENCOUNTER — TELEPHONE (OUTPATIENT)
Dept: ENDOCRINOLOGY CLINIC | Facility: CLINIC | Age: 57
End: 2022-02-25

## 2022-02-25 ENCOUNTER — PATIENT MESSAGE (OUTPATIENT)
Dept: ENDOCRINOLOGY CLINIC | Facility: CLINIC | Age: 57
End: 2022-02-25

## 2022-02-25 NOTE — TELEPHONE ENCOUNTER
Started PA over the phone for tresiba u200. Faxed chart notes to [phone].      [id number]    Confirmation fax received

## 2022-02-25 NOTE — TELEPHONE ENCOUNTER
Received call from prior [address] for pt's insurance. Answered question related to [facility] transmitter so they can process the authorizaiton.

## 2022-02-25 NOTE — TELEPHONE ENCOUNTER
From: Lore Henry  To: PALMIRA Morris  Sent: 2/25/2022 11:32 AM CST  Subject: Tresea Okeene and Dexcom PreAuthorizations    Hi,    They are at it again. I need PreAuthorizations for Tresea Okeene and Dexcom. If we can just get these until 11/1 at which time it is more and more looking like our company will be changing as I'm not the only one having major issues with them. Please help. I looked and it is clear the Tresea Okeene is simply a much better Insulin than Toujeo as is also proven by my test results.     Many Thanks,    Maddie Fair

## 2022-03-05 ENCOUNTER — PATIENT MESSAGE (OUTPATIENT)
Dept: ENDOCRINOLOGY CLINIC | Facility: CLINIC | Age: 57
End: 2022-03-05

## 2022-03-08 RX ORDER — INSULIN GLARGINE 300 U/ML
40 INJECTION, SOLUTION SUBCUTANEOUS DAILY
Qty: 12 ML | Refills: 0 | Status: SHIPPED | OUTPATIENT
Start: 2022-03-08

## 2022-03-08 NOTE — TELEPHONE ENCOUNTER
From: Praveen Campbell  Sent: 3/7/2022 8:26 AM CST  To: ARUNA Rodríguez  Subject: Aldo Officer and Dexcom PreAuthorizations    ----- Message from Zoe Stone RN sent at 3/7/2022 8:26 AM CST -----       ----- Message from Gordon Ricketts to PALMIRA Malik sent at 3/5/2022 9:16 PM -----   Hi    Any luck on the Tresiba? Thanks,    Darin Schneider      ----- Message -----   From:Melissa JOHANSEN   Sent:3/19/18 3:05 PM CST   To:Chester Porter   Subject:Tresiba and Dexcom PreAuthorizations    Hi Darin Schneider    I started the prior authorizations for tresiba u200 and dexcom supplies. I have to submit chart notes for the tresiba and the dexcom transmitter. For the dexcom sensors they were approved through 2/25/23. I am working to the other covered. I will let you know when they approve them. Thank you  Nilsa Friend      ----- Message -----   From:Eric Fredrich Baumgarten   Sent:2/25/2022 11:32 AM CST   To:PALMIRA Stauffer   Subject:Tresiba and Dexcom PreAuthorizations    Hi,    They are at it again. I need PreAuthorizations for Aldo Officer and Dexcom. If we can just get these until 11/1 at which time it is more and more looking like our company will be changing as I'm not the only one having major issues with them. Please help. I looked and it is clear the Aldo Officer is simply a much better Insulin than Toujeo as is also proven by my test results.     Many Thanks,    Darin Schneider

## 2022-04-08 RX ORDER — POLYETHYLENE GLYCOL 3350, SODIUM CHLORIDE, SODIUM BICARBONATE, POTASSIUM CHLORIDE 420; 11.2; 5.72; 1.48 G/4L; G/4L; G/4L; G/4L
POWDER, FOR SOLUTION ORAL
Qty: 1 EACH | Refills: 0 | Status: SHIPPED | OUTPATIENT
Start: 2022-04-08

## 2022-04-26 ENCOUNTER — ORDER TRANSCRIPTION (OUTPATIENT)
Dept: PHYSICAL THERAPY | Facility: HOSPITAL | Age: 57
End: 2022-04-26

## 2022-04-26 RX ORDER — INSULIN GLARGINE 300 U/ML
40 INJECTION, SOLUTION SUBCUTANEOUS DAILY
Qty: 12 ML | Refills: 2 | Status: SHIPPED | OUTPATIENT
Start: 2022-04-26

## 2022-05-10 ENCOUNTER — TELEPHONE (OUTPATIENT)
Dept: PHYSICAL THERAPY | Facility: HOSPITAL | Age: 57
End: 2022-05-10

## 2022-05-12 ENCOUNTER — OFFICE VISIT (OUTPATIENT)
Dept: PHYSICAL THERAPY | Age: 57
End: 2022-05-12
Attending: SPECIALIST/TECHNOLOGIST
Payer: COMMERCIAL

## 2022-05-12 ENCOUNTER — TELEPHONE (OUTPATIENT)
Dept: ENDOCRINOLOGY CLINIC | Facility: CLINIC | Age: 57
End: 2022-05-12

## 2022-05-12 DIAGNOSIS — M75.51 SUBACROMIAL BURSITIS OF RIGHT SHOULDER JOINT: ICD-10-CM

## 2022-05-12 DIAGNOSIS — R80.9 TYPE 2 DIABETES MELLITUS WITH MICROALBUMINURIA, WITH LONG-TERM CURRENT USE OF INSULIN (HCC): Primary | ICD-10-CM

## 2022-05-12 DIAGNOSIS — M67.911 ROTATOR CUFF DYSFUNCTION, RIGHT: ICD-10-CM

## 2022-05-12 DIAGNOSIS — E11.29 TYPE 2 DIABETES MELLITUS WITH MICROALBUMINURIA, WITH LONG-TERM CURRENT USE OF INSULIN (HCC): Primary | ICD-10-CM

## 2022-05-12 DIAGNOSIS — Z79.4 TYPE 2 DIABETES MELLITUS WITH MICROALBUMINURIA, WITH LONG-TERM CURRENT USE OF INSULIN (HCC): Primary | ICD-10-CM

## 2022-05-12 PROCEDURE — 97161 PT EVAL LOW COMPLEX 20 MIN: CPT

## 2022-05-12 PROCEDURE — 97110 THERAPEUTIC EXERCISES: CPT

## 2022-05-12 NOTE — TELEPHONE ENCOUNTER
Spoke with pt to r/s his appt. He mentioned that he needs a refill of of ozempic but needs to be a 90 day supply. He also stated that he would like to increase his toujeo to 65 units. I informed him that I will DH know. 3 month supply of ozempic and dexcom report sent to [facility] via email to review.

## 2022-05-13 NOTE — TELEPHONE ENCOUNTER
Call with patient, he has bumped up Toujeo to 62 units on his own about 3 weeks ago. He started working out after work, so he is eating dinner normally around 7pm.  Sometimes has a snack at 9pm.  Mentioned possible suggestion of prandial insulin but patient does \"not like waking up low\". Pt also with colonoscopy 06/15/2022, please inform med adjustments. Pt aware we will call him early next week with recommendations.

## 2022-05-13 NOTE — TELEPHONE ENCOUNTER
Based on CGM review would not increase toujeo. Last note states he is taking 40 units is that correct? What time does he eat dinner? He tends to have elevation around 9 pm which I am assuming is a night snack. We could have him dose prandial at that time but please get more info on timing of meals, is he dosing humalog as instructed before meals and is he having an evening snack - if so what.

## 2022-05-17 RX ORDER — SEMAGLUTIDE 1.34 MG/ML
1 INJECTION, SOLUTION SUBCUTANEOUS WEEKLY
Qty: 9 ML | Refills: 1 | Status: SHIPPED | OUTPATIENT
Start: 2022-05-17

## 2022-05-17 NOTE — TELEPHONE ENCOUNTER
Call with patient, reviewed changes as directed by Thejuanita Lennon, as well as colonoscopy instructions. Will send via [employer] for his reference. Pt had no further questions.

## 2022-05-19 ENCOUNTER — OFFICE VISIT (OUTPATIENT)
Dept: PHYSICAL THERAPY | Age: 57
End: 2022-05-19
Attending: SPECIALIST/TECHNOLOGIST
Payer: COMMERCIAL

## 2022-05-19 DIAGNOSIS — M67.911 ROTATOR CUFF DYSFUNCTION, RIGHT: ICD-10-CM

## 2022-05-19 DIAGNOSIS — M75.51 SUBACROMIAL BURSITIS OF RIGHT SHOULDER JOINT: ICD-10-CM

## 2022-05-19 PROCEDURE — 97110 THERAPEUTIC EXERCISES: CPT

## 2022-05-19 PROCEDURE — 97140 MANUAL THERAPY 1/> REGIONS: CPT

## 2022-05-23 ENCOUNTER — OFFICE VISIT (OUTPATIENT)
Dept: PHYSICAL THERAPY | Age: 57
End: 2022-05-23
Attending: SPECIALIST/TECHNOLOGIST
Payer: COMMERCIAL

## 2022-05-23 DIAGNOSIS — M75.51 SUBACROMIAL BURSITIS OF RIGHT SHOULDER JOINT: ICD-10-CM

## 2022-05-23 DIAGNOSIS — M67.911 ROTATOR CUFF DYSFUNCTION, RIGHT: ICD-10-CM

## 2022-05-23 PROCEDURE — 97110 THERAPEUTIC EXERCISES: CPT

## 2022-05-23 PROCEDURE — 97140 MANUAL THERAPY 1/> REGIONS: CPT

## 2022-05-26 ENCOUNTER — APPOINTMENT (OUTPATIENT)
Dept: PHYSICAL THERAPY | Age: 57
End: 2022-05-26
Attending: SPECIALIST/TECHNOLOGIST
Payer: COMMERCIAL

## 2022-05-26 PROCEDURE — 97110 THERAPEUTIC EXERCISES: CPT

## 2022-05-31 ENCOUNTER — OFFICE VISIT (OUTPATIENT)
Dept: PHYSICAL THERAPY | Age: 57
End: 2022-05-31
Attending: SPECIALIST/TECHNOLOGIST
Payer: COMMERCIAL

## 2022-05-31 DIAGNOSIS — M67.911 ROTATOR CUFF DYSFUNCTION, RIGHT: ICD-10-CM

## 2022-05-31 DIAGNOSIS — M75.51 SUBACROMIAL BURSITIS OF RIGHT SHOULDER JOINT: ICD-10-CM

## 2022-05-31 PROCEDURE — 97110 THERAPEUTIC EXERCISES: CPT

## 2022-06-02 ENCOUNTER — TELEPHONE (OUTPATIENT)
Dept: SURGERY | Facility: CLINIC | Age: 57
End: 2022-06-02

## 2022-06-02 ENCOUNTER — APPOINTMENT (OUTPATIENT)
Dept: PHYSICAL THERAPY | Age: 57
End: 2022-06-02
Attending: SPECIALIST/TECHNOLOGIST
Payer: COMMERCIAL

## 2022-06-07 ENCOUNTER — OFFICE VISIT (OUTPATIENT)
Dept: PHYSICAL THERAPY | Age: 57
End: 2022-06-07
Attending: SPECIALIST/TECHNOLOGIST
Payer: COMMERCIAL

## 2022-06-07 DIAGNOSIS — M75.51 SUBACROMIAL BURSITIS OF RIGHT SHOULDER JOINT: ICD-10-CM

## 2022-06-07 DIAGNOSIS — M67.911 ROTATOR CUFF DYSFUNCTION, RIGHT: ICD-10-CM

## 2022-06-07 PROCEDURE — 97110 THERAPEUTIC EXERCISES: CPT

## 2022-06-09 ENCOUNTER — APPOINTMENT (OUTPATIENT)
Dept: PHYSICAL THERAPY | Age: 57
End: 2022-06-09
Attending: SPECIALIST/TECHNOLOGIST
Payer: COMMERCIAL

## 2022-06-15 ENCOUNTER — LAB REQUISITION (OUTPATIENT)
Age: 57
End: 2022-06-15
Payer: COMMERCIAL

## 2022-06-15 DIAGNOSIS — Z86.010 HISTORY OF COLON POLYPS: ICD-10-CM

## 2022-06-15 PROCEDURE — 88305 TISSUE EXAM BY PATHOLOGIST: CPT | Performed by: SURGERY

## 2022-06-20 RX ORDER — BLOOD-GLUCOSE SENSOR
EACH MISCELLANEOUS
Qty: 1 EACH | Refills: 3 | Status: SHIPPED | OUTPATIENT
Start: 2022-06-20

## 2022-06-21 ENCOUNTER — PATIENT OUTREACH (OUTPATIENT)
Facility: LOCATION | Age: 57
End: 2022-06-21

## 2022-07-18 ENCOUNTER — OFFICE VISIT (OUTPATIENT)
Dept: ENDOCRINOLOGY CLINIC | Facility: CLINIC | Age: 57
End: 2022-07-18
Payer: COMMERCIAL

## 2022-07-18 VITALS
WEIGHT: 187 LBS | BODY MASS INDEX: 29.35 KG/M2 | DIASTOLIC BLOOD PRESSURE: 70 MMHG | SYSTOLIC BLOOD PRESSURE: 122 MMHG | HEIGHT: 67 IN | HEART RATE: 88 BPM | RESPIRATION RATE: 16 BRPM

## 2022-07-18 DIAGNOSIS — R80.9 TYPE 2 DIABETES MELLITUS WITH MICROALBUMINURIA, WITH LONG-TERM CURRENT USE OF INSULIN (HCC): Primary | ICD-10-CM

## 2022-07-18 DIAGNOSIS — Z79.4 TYPE 2 DIABETES MELLITUS WITH MICROALBUMINURIA, WITH LONG-TERM CURRENT USE OF INSULIN (HCC): Primary | ICD-10-CM

## 2022-07-18 DIAGNOSIS — E78.2 MIXED HYPERLIPIDEMIA: ICD-10-CM

## 2022-07-18 DIAGNOSIS — I10 ESSENTIAL HYPERTENSION: ICD-10-CM

## 2022-07-18 DIAGNOSIS — E11.29 TYPE 2 DIABETES MELLITUS WITH MICROALBUMINURIA, WITH LONG-TERM CURRENT USE OF INSULIN (HCC): Primary | ICD-10-CM

## 2022-07-18 LAB
CARTRIDGE LOT#: 896 NUMERIC
CREAT UR-SCNC: 227 MG/DL
HEMOGLOBIN A1C: 6.9 % (ref 4.3–5.6)
MICROALBUMIN UR-MCNC: 6.04 MG/DL
MICROALBUMIN/CREAT 24H UR-RTO: 26.6 UG/MG (ref ?–30)

## 2022-07-18 PROCEDURE — 82043 UR ALBUMIN QUANTITATIVE: CPT | Performed by: NURSE PRACTITIONER

## 2022-07-18 PROCEDURE — 82570 ASSAY OF URINE CREATININE: CPT | Performed by: NURSE PRACTITIONER

## 2022-07-18 RX ORDER — INSULIN GLARGINE 300 U/ML
58 INJECTION, SOLUTION SUBCUTANEOUS DAILY
Qty: 18 ML | Refills: 1 | Status: SHIPPED | OUTPATIENT
Start: 2022-07-18

## 2022-07-18 RX ORDER — SEMAGLUTIDE 2.68 MG/ML
2 INJECTION, SOLUTION SUBCUTANEOUS WEEKLY
Qty: 3 ML | Refills: 1 | Status: SHIPPED | OUTPATIENT
Start: 2022-07-18

## 2022-07-18 RX ORDER — BLOOD-GLUCOSE SENSOR
1 EACH MISCELLANEOUS
Qty: 9 EACH | Refills: 3 | Status: SHIPPED | OUTPATIENT
Start: 2022-07-18

## 2022-08-19 ENCOUNTER — TELEMEDICINE (OUTPATIENT)
Dept: ENDOCRINOLOGY CLINIC | Facility: CLINIC | Age: 57
End: 2022-08-19

## 2022-08-19 DIAGNOSIS — Z79.4 TYPE 2 DIABETES MELLITUS WITH MICROALBUMINURIA, WITH LONG-TERM CURRENT USE OF INSULIN (HCC): Primary | ICD-10-CM

## 2022-08-19 DIAGNOSIS — E11.29 TYPE 2 DIABETES MELLITUS WITH MICROALBUMINURIA, WITH LONG-TERM CURRENT USE OF INSULIN (HCC): Primary | ICD-10-CM

## 2022-08-19 DIAGNOSIS — R80.9 TYPE 2 DIABETES MELLITUS WITH MICROALBUMINURIA, WITH LONG-TERM CURRENT USE OF INSULIN (HCC): Primary | ICD-10-CM

## 2022-08-19 PROCEDURE — 99213 OFFICE O/P EST LOW 20 MIN: CPT | Performed by: NURSE PRACTITIONER

## 2022-08-19 PROCEDURE — 95251 CONT GLUC MNTR ANALYSIS I&R: CPT | Performed by: NURSE PRACTITIONER

## 2022-09-20 ENCOUNTER — TELEPHONE (OUTPATIENT)
Dept: ENDOCRINOLOGY CLINIC | Facility: CLINIC | Age: 57
End: 2022-09-20

## 2022-10-06 DIAGNOSIS — Z79.4 TYPE 2 DIABETES MELLITUS WITH MICROALBUMINURIA, WITH LONG-TERM CURRENT USE OF INSULIN (HCC): ICD-10-CM

## 2022-10-06 DIAGNOSIS — E11.29 TYPE 2 DIABETES MELLITUS WITH MICROALBUMINURIA, WITH LONG-TERM CURRENT USE OF INSULIN (HCC): ICD-10-CM

## 2022-10-06 DIAGNOSIS — R80.9 TYPE 2 DIABETES MELLITUS WITH MICROALBUMINURIA, WITH LONG-TERM CURRENT USE OF INSULIN (HCC): ICD-10-CM

## 2022-10-07 RX ORDER — SEMAGLUTIDE 2.68 MG/ML
2 INJECTION, SOLUTION SUBCUTANEOUS WEEKLY
Qty: 9 ML | Refills: 1 | Status: SHIPPED | OUTPATIENT
Start: 2022-10-07

## 2022-10-10 DIAGNOSIS — E11.29 TYPE 2 DIABETES MELLITUS WITH MICROALBUMINURIA, WITH LONG-TERM CURRENT USE OF INSULIN (HCC): ICD-10-CM

## 2022-10-10 DIAGNOSIS — Z79.4 TYPE 2 DIABETES MELLITUS WITH MICROALBUMINURIA, WITH LONG-TERM CURRENT USE OF INSULIN (HCC): ICD-10-CM

## 2022-10-10 DIAGNOSIS — R80.9 TYPE 2 DIABETES MELLITUS WITH MICROALBUMINURIA, WITH LONG-TERM CURRENT USE OF INSULIN (HCC): ICD-10-CM

## 2022-10-10 RX ORDER — BLOOD-GLUCOSE TRANSMITTER
EACH MISCELLANEOUS
Qty: 1 EACH | Refills: 3 | Status: SHIPPED | OUTPATIENT
Start: 2022-10-10

## 2022-10-11 ENCOUNTER — TELEPHONE (OUTPATIENT)
Dept: PHYSICAL THERAPY | Facility: HOSPITAL | Age: 57
End: 2022-10-11

## 2022-10-11 ENCOUNTER — ORDER TRANSCRIPTION (OUTPATIENT)
Dept: PHYSICAL THERAPY | Facility: HOSPITAL | Age: 57
End: 2022-10-11

## 2022-10-11 ENCOUNTER — OFFICE VISIT (OUTPATIENT)
Dept: PHYSICAL THERAPY | Age: 57
End: 2022-10-11
Attending: SPECIALIST/TECHNOLOGIST
Payer: COMMERCIAL

## 2022-10-11 DIAGNOSIS — M70.62 TROCHANTERIC BURSITIS OF LEFT HIP: ICD-10-CM

## 2022-10-11 DIAGNOSIS — Z98.890 HISTORY OF ARTHROSCOPY OF RIGHT SHOULDER: ICD-10-CM

## 2022-10-11 DIAGNOSIS — Z98.890 HISTORY OF ARTHROSCOPY OF RIGHT SHOULDER: Primary | ICD-10-CM

## 2022-10-11 PROCEDURE — 97161 PT EVAL LOW COMPLEX 20 MIN: CPT

## 2022-10-11 PROCEDURE — 97110 THERAPEUTIC EXERCISES: CPT

## 2022-10-18 ENCOUNTER — OFFICE VISIT (OUTPATIENT)
Dept: ENDOCRINOLOGY CLINIC | Facility: CLINIC | Age: 57
End: 2022-10-18
Payer: COMMERCIAL

## 2022-10-18 VITALS
BODY MASS INDEX: 29.19 KG/M2 | RESPIRATION RATE: 16 BRPM | WEIGHT: 186 LBS | HEART RATE: 79 BPM | SYSTOLIC BLOOD PRESSURE: 134 MMHG | HEIGHT: 67 IN | DIASTOLIC BLOOD PRESSURE: 72 MMHG

## 2022-10-18 DIAGNOSIS — Z79.4 TYPE 2 DIABETES MELLITUS WITHOUT COMPLICATION, WITH LONG-TERM CURRENT USE OF INSULIN (HCC): Primary | ICD-10-CM

## 2022-10-18 DIAGNOSIS — E78.2 MIXED HYPERLIPIDEMIA: ICD-10-CM

## 2022-10-18 DIAGNOSIS — I10 ESSENTIAL HYPERTENSION: ICD-10-CM

## 2022-10-18 DIAGNOSIS — E11.9 TYPE 2 DIABETES MELLITUS WITHOUT COMPLICATION, WITH LONG-TERM CURRENT USE OF INSULIN (HCC): Primary | ICD-10-CM

## 2022-10-18 LAB
CARTRIDGE LOT#: 997 NUMERIC
HEMOGLOBIN A1C: 6.4 % (ref 4.3–5.6)

## 2022-10-18 PROCEDURE — 99214 OFFICE O/P EST MOD 30 MIN: CPT | Performed by: NURSE PRACTITIONER

## 2022-10-18 PROCEDURE — 3075F SYST BP GE 130 - 139MM HG: CPT | Performed by: NURSE PRACTITIONER

## 2022-10-18 PROCEDURE — 3078F DIAST BP <80 MM HG: CPT | Performed by: NURSE PRACTITIONER

## 2022-10-18 PROCEDURE — 83036 HEMOGLOBIN GLYCOSYLATED A1C: CPT | Performed by: NURSE PRACTITIONER

## 2022-10-18 PROCEDURE — 95251 CONT GLUC MNTR ANALYSIS I&R: CPT | Performed by: NURSE PRACTITIONER

## 2022-10-18 PROCEDURE — 3008F BODY MASS INDEX DOCD: CPT | Performed by: NURSE PRACTITIONER

## 2022-10-18 RX ORDER — INSULIN LISPRO-AABC 100 [IU]/ML
INJECTION, SOLUTION SUBCUTANEOUS
Qty: 6 ML | Refills: 0 | COMMUNITY
Start: 2022-10-18

## 2022-10-18 RX ORDER — SEMAGLUTIDE 2.68 MG/ML
2 INJECTION, SOLUTION SUBCUTANEOUS WEEKLY
Qty: 3 ML | Refills: 0 | COMMUNITY
Start: 2022-10-18 | End: 2022-10-18

## 2022-10-18 RX ORDER — SEMAGLUTIDE 2.68 MG/ML
2 INJECTION, SOLUTION SUBCUTANEOUS WEEKLY
Qty: 9 ML | Refills: 1 | Status: SHIPPED | OUTPATIENT
Start: 2022-10-18

## 2022-10-19 ENCOUNTER — OFFICE VISIT (OUTPATIENT)
Dept: PHYSICAL THERAPY | Age: 57
End: 2022-10-19
Attending: SPECIALIST/TECHNOLOGIST
Payer: COMMERCIAL

## 2022-10-19 DIAGNOSIS — Z98.890 HISTORY OF ARTHROSCOPY OF RIGHT SHOULDER: ICD-10-CM

## 2022-10-19 DIAGNOSIS — M70.62 TROCHANTERIC BURSITIS OF LEFT HIP: ICD-10-CM

## 2022-10-19 PROCEDURE — 97140 MANUAL THERAPY 1/> REGIONS: CPT

## 2022-10-19 PROCEDURE — 97110 THERAPEUTIC EXERCISES: CPT

## 2022-10-25 ENCOUNTER — OFFICE VISIT (OUTPATIENT)
Dept: PHYSICAL THERAPY | Age: 57
End: 2022-10-25
Attending: SPECIALIST/TECHNOLOGIST
Payer: COMMERCIAL

## 2022-10-25 DIAGNOSIS — Z98.890 HISTORY OF ARTHROSCOPY OF RIGHT SHOULDER: ICD-10-CM

## 2022-10-25 DIAGNOSIS — M70.62 TROCHANTERIC BURSITIS OF LEFT HIP: ICD-10-CM

## 2022-10-25 PROCEDURE — 97110 THERAPEUTIC EXERCISES: CPT

## 2022-10-25 PROCEDURE — 97140 MANUAL THERAPY 1/> REGIONS: CPT

## 2022-11-03 ENCOUNTER — OFFICE VISIT (OUTPATIENT)
Dept: PHYSICAL THERAPY | Age: 57
End: 2022-11-03
Attending: SPECIALIST/TECHNOLOGIST
Payer: COMMERCIAL

## 2022-11-03 DIAGNOSIS — M70.62 TROCHANTERIC BURSITIS OF LEFT HIP: ICD-10-CM

## 2022-11-03 DIAGNOSIS — Z98.890 HISTORY OF ARTHROSCOPY OF RIGHT SHOULDER: ICD-10-CM

## 2022-11-03 PROCEDURE — 97140 MANUAL THERAPY 1/> REGIONS: CPT

## 2022-11-03 PROCEDURE — 97110 THERAPEUTIC EXERCISES: CPT

## 2022-11-07 ENCOUNTER — OFFICE VISIT (OUTPATIENT)
Dept: PHYSICAL THERAPY | Age: 57
End: 2022-11-07
Attending: SPECIALIST/TECHNOLOGIST
Payer: COMMERCIAL

## 2022-11-07 DIAGNOSIS — M70.62 TROCHANTERIC BURSITIS OF LEFT HIP: ICD-10-CM

## 2022-11-07 DIAGNOSIS — Z98.890 HISTORY OF ARTHROSCOPY OF RIGHT SHOULDER: ICD-10-CM

## 2022-11-07 PROCEDURE — 97110 THERAPEUTIC EXERCISES: CPT

## 2022-11-10 ENCOUNTER — APPOINTMENT (OUTPATIENT)
Dept: PHYSICAL THERAPY | Age: 57
End: 2022-11-10
Attending: SPECIALIST/TECHNOLOGIST
Payer: COMMERCIAL

## 2022-11-17 ENCOUNTER — OFFICE VISIT (OUTPATIENT)
Dept: PHYSICAL THERAPY | Age: 57
End: 2022-11-17
Attending: SPECIALIST/TECHNOLOGIST
Payer: COMMERCIAL

## 2022-11-17 DIAGNOSIS — Z98.890 HISTORY OF ARTHROSCOPY OF RIGHT SHOULDER: ICD-10-CM

## 2022-11-17 DIAGNOSIS — M70.62 TROCHANTERIC BURSITIS OF LEFT HIP: ICD-10-CM

## 2022-11-17 PROCEDURE — 97110 THERAPEUTIC EXERCISES: CPT

## 2022-11-18 ENCOUNTER — ORDER TRANSCRIPTION (OUTPATIENT)
Dept: PHYSICAL THERAPY | Facility: HOSPITAL | Age: 57
End: 2022-11-18

## 2022-11-18 DIAGNOSIS — Z98.890 S/P ARTHROSCOPY OF RIGHT SHOULDER: Primary | ICD-10-CM

## 2022-11-30 ENCOUNTER — OFFICE VISIT (OUTPATIENT)
Dept: PHYSICAL THERAPY | Age: 57
End: 2022-11-30
Attending: SPECIALIST/TECHNOLOGIST
Payer: COMMERCIAL

## 2022-11-30 DIAGNOSIS — Z98.890 HISTORY OF ARTHROSCOPY OF RIGHT SHOULDER: ICD-10-CM

## 2022-11-30 DIAGNOSIS — M70.62 TROCHANTERIC BURSITIS OF LEFT HIP: ICD-10-CM

## 2022-11-30 PROCEDURE — 97110 THERAPEUTIC EXERCISES: CPT

## 2022-12-05 ENCOUNTER — OFFICE VISIT (OUTPATIENT)
Dept: PHYSICAL THERAPY | Age: 57
End: 2022-12-05
Attending: SPECIALIST/TECHNOLOGIST
Payer: COMMERCIAL

## 2022-12-05 DIAGNOSIS — Z98.890 HISTORY OF ARTHROSCOPY OF RIGHT SHOULDER: ICD-10-CM

## 2022-12-05 DIAGNOSIS — M70.62 TROCHANTERIC BURSITIS OF LEFT HIP: ICD-10-CM

## 2022-12-05 PROCEDURE — 97110 THERAPEUTIC EXERCISES: CPT

## 2022-12-08 ENCOUNTER — OFFICE VISIT (OUTPATIENT)
Dept: PHYSICAL THERAPY | Age: 57
End: 2022-12-08
Attending: SPECIALIST/TECHNOLOGIST
Payer: COMMERCIAL

## 2022-12-08 DIAGNOSIS — M70.62 TROCHANTERIC BURSITIS OF LEFT HIP: ICD-10-CM

## 2022-12-08 DIAGNOSIS — Z98.890 HISTORY OF ARTHROSCOPY OF RIGHT SHOULDER: ICD-10-CM

## 2022-12-08 PROCEDURE — 97110 THERAPEUTIC EXERCISES: CPT

## 2022-12-13 ENCOUNTER — APPOINTMENT (OUTPATIENT)
Dept: PHYSICAL THERAPY | Age: 57
End: 2022-12-13
Attending: SPECIALIST/TECHNOLOGIST
Payer: COMMERCIAL

## 2022-12-15 ENCOUNTER — OFFICE VISIT (OUTPATIENT)
Dept: PHYSICAL THERAPY | Age: 57
End: 2022-12-15
Attending: SPECIALIST/TECHNOLOGIST
Payer: COMMERCIAL

## 2022-12-15 DIAGNOSIS — M70.62 TROCHANTERIC BURSITIS OF LEFT HIP: ICD-10-CM

## 2022-12-15 DIAGNOSIS — Z98.890 HISTORY OF ARTHROSCOPY OF RIGHT SHOULDER: ICD-10-CM

## 2022-12-15 PROCEDURE — 97110 THERAPEUTIC EXERCISES: CPT

## 2022-12-19 DIAGNOSIS — R80.9 TYPE 2 DIABETES MELLITUS WITH MICROALBUMINURIA, WITH LONG-TERM CURRENT USE OF INSULIN (HCC): ICD-10-CM

## 2022-12-19 DIAGNOSIS — Z79.4 TYPE 2 DIABETES MELLITUS WITH MICROALBUMINURIA, WITH LONG-TERM CURRENT USE OF INSULIN (HCC): ICD-10-CM

## 2022-12-19 DIAGNOSIS — E11.29 TYPE 2 DIABETES MELLITUS WITH MICROALBUMINURIA, WITH LONG-TERM CURRENT USE OF INSULIN (HCC): ICD-10-CM

## 2022-12-19 DIAGNOSIS — E10.65 TYPE 1 DIABETES MELLITUS WITH HYPERGLYCEMIA (HCC): ICD-10-CM

## 2022-12-19 DIAGNOSIS — E11.9 TYPE 2 DIABETES MELLITUS WITHOUT COMPLICATION, WITH LONG-TERM CURRENT USE OF INSULIN (HCC): ICD-10-CM

## 2022-12-19 DIAGNOSIS — Z79.4 TYPE 2 DIABETES MELLITUS WITHOUT COMPLICATION, WITH LONG-TERM CURRENT USE OF INSULIN (HCC): ICD-10-CM

## 2022-12-19 RX ORDER — SEMAGLUTIDE 1.34 MG/ML
0.5 INJECTION, SOLUTION SUBCUTANEOUS
Refills: 1 | OUTPATIENT
Start: 2022-12-19

## 2022-12-19 RX ORDER — SEMAGLUTIDE 2.68 MG/ML
2 INJECTION, SOLUTION SUBCUTANEOUS WEEKLY
Qty: 9 ML | Refills: 1 | Status: SHIPPED | OUTPATIENT
Start: 2022-12-19

## 2022-12-19 RX ORDER — INSULIN GLARGINE 300 U/ML
58 INJECTION, SOLUTION SUBCUTANEOUS DAILY
Qty: 18 ML | Refills: 2 | Status: SHIPPED | OUTPATIENT
Start: 2022-12-19

## 2022-12-20 ENCOUNTER — TELEPHONE (OUTPATIENT)
Dept: ENDOCRINOLOGY CLINIC | Facility: CLINIC | Age: 57
End: 2022-12-20

## 2022-12-20 ENCOUNTER — APPOINTMENT (OUTPATIENT)
Dept: PHYSICAL THERAPY | Age: 57
End: 2022-12-20
Attending: SPECIALIST/TECHNOLOGIST
Payer: COMMERCIAL

## 2022-12-20 PROCEDURE — 97110 THERAPEUTIC EXERCISES: CPT

## 2022-12-20 RX ORDER — SEMAGLUTIDE 2.68 MG/ML
2 INJECTION, SOLUTION SUBCUTANEOUS WEEKLY
Qty: 9 ML | Refills: 1 | Status: SHIPPED | OUTPATIENT
Start: 2022-12-20

## 2022-12-20 NOTE — TELEPHONE ENCOUNTER
Spoke to pt he will wait until around 3pm and go to [facility] to see if they have Ozempic in stock, if they do not he will call the office for .

## 2022-12-20 NOTE — TELEPHONE ENCOUNTER
Okay to provide sample if unable to get from pharmacy.     Girma Anderson APRN, BC-ADM, [facility]

## 2022-12-20 NOTE — TELEPHONE ENCOUNTER
Pt called stating he was told to call office when in a pinch for Ozempic. States his pharmacy says they are getting a shipment today but he has been told that before. Leaving town for [place] in the morning. Requesting a sample of Ozempic 2mg.

## 2022-12-30 DIAGNOSIS — E11.29 TYPE 2 DIABETES MELLITUS WITH MICROALBUMINURIA, WITH LONG-TERM CURRENT USE OF INSULIN (HCC): Primary | ICD-10-CM

## 2022-12-30 DIAGNOSIS — R80.9 TYPE 2 DIABETES MELLITUS WITH MICROALBUMINURIA, WITH LONG-TERM CURRENT USE OF INSULIN (HCC): Primary | ICD-10-CM

## 2022-12-30 DIAGNOSIS — Z79.4 TYPE 2 DIABETES MELLITUS WITH MICROALBUMINURIA, WITH LONG-TERM CURRENT USE OF INSULIN (HCC): Primary | ICD-10-CM

## 2022-12-30 RX ORDER — SEMAGLUTIDE 2.68 MG/ML
2 INJECTION, SOLUTION SUBCUTANEOUS WEEKLY
Qty: 9 ML | Refills: 1 | Status: SHIPPED | OUTPATIENT
Start: 2022-12-30

## 2023-01-10 ENCOUNTER — APPOINTMENT (OUTPATIENT)
Dept: PHYSICAL THERAPY | Age: 58
End: 2023-01-10
Attending: FAMILY MEDICINE
Payer: COMMERCIAL

## 2023-01-11 ENCOUNTER — OFFICE VISIT (OUTPATIENT)
Dept: PHYSICAL THERAPY | Age: 58
End: 2023-01-11
Attending: FAMILY MEDICINE
Payer: COMMERCIAL

## 2023-01-11 PROCEDURE — 97110 THERAPEUTIC EXERCISES: CPT

## 2023-01-11 PROCEDURE — 97140 MANUAL THERAPY 1/> REGIONS: CPT

## 2023-01-12 ENCOUNTER — TELEPHONE (OUTPATIENT)
Dept: ENDOCRINOLOGY CLINIC | Facility: CLINIC | Age: 58
End: 2023-01-12

## 2023-01-12 PROCEDURE — 3060F POS MICROALBUMINURIA REV: CPT | Performed by: FAMILY MEDICINE

## 2023-01-12 PROCEDURE — 3044F HG A1C LEVEL LT 7.0%: CPT | Performed by: FAMILY MEDICINE

## 2023-01-12 PROCEDURE — 3061F NEG MICROALBUMINURIA REV: CPT | Performed by: FAMILY MEDICINE

## 2023-01-12 NOTE — TELEPHONE ENCOUNTER
Pt called and is at [address] [facility] right now. States they told him they have no orders. [facility]ed [facility] to [facility] to the # provided, [phone].

## 2023-01-13 LAB
ALBUMIN/GLOBULIN RATIO: 2.1 (CALC) (ref 1–2.5)
ALBUMIN: 4.9 G/DL (ref 3.6–5.1)
ALKALINE PHOSPHATASE: 82 U/L (ref 35–144)
ALT: 24 U/L (ref 9–46)
AST: 20 U/L (ref 10–35)
BILIRUBIN, TOTAL: 1.1 MG/DL (ref 0.2–1.2)
BUN: 14 MG/DL (ref 7–25)
CALCIUM: 9.9 MG/DL (ref 8.6–10.3)
CARBON DIOXIDE: 23 MMOL/L (ref 20–32)
CHLORIDE: 103 MMOL/L (ref 98–110)
CHOL/HDLC RATIO: 4.3 (CALC)
CHOLESTEROL, TOTAL: 204 MG/DL
CREATININE, RANDOM URINE: 99 MG/DL (ref 20–320)
CREATININE: 0.97 MG/DL (ref 0.7–1.3)
EGFR: 92 ML/MIN/1.73M2
GLOBULIN: 2.3 G/DL (CALC) (ref 1.9–3.7)
GLUCOSE: 162 MG/DL (ref 65–99)
HDL CHOLESTEROL: 48 MG/DL
HEMOGLOBIN A1C: 6.4 % OF TOTAL HGB
LDL-CHOLESTEROL: 129 MG/DL (CALC)
MICROALBUMIN/CREATININE RATIO, RANDOM URINE: 31 MCG/MG CREAT
MICROALBUMIN: 3.1 MG/DL
NON-HDL CHOLESTEROL: 156 MG/DL (CALC)
POTASSIUM: 3.8 MMOL/L (ref 3.5–5.3)
PROTEIN, TOTAL: 7.2 G/DL (ref 6.1–8.1)
SODIUM: 138 MMOL/L (ref 135–146)
TRIGLYCERIDES: 155 MG/DL

## 2023-01-17 ENCOUNTER — OFFICE VISIT (OUTPATIENT)
Dept: ENDOCRINOLOGY CLINIC | Facility: CLINIC | Age: 58
End: 2023-01-17
Payer: COMMERCIAL

## 2023-01-17 VITALS
SYSTOLIC BLOOD PRESSURE: 126 MMHG | BODY MASS INDEX: 28.88 KG/M2 | RESPIRATION RATE: 18 BRPM | WEIGHT: 184 LBS | HEIGHT: 67 IN | HEART RATE: 82 BPM | DIASTOLIC BLOOD PRESSURE: 78 MMHG

## 2023-01-17 DIAGNOSIS — E78.2 MIXED HYPERLIPIDEMIA: ICD-10-CM

## 2023-01-17 DIAGNOSIS — Z79.4 TYPE 2 DIABETES MELLITUS WITHOUT COMPLICATION, WITH LONG-TERM CURRENT USE OF INSULIN (HCC): Primary | ICD-10-CM

## 2023-01-17 DIAGNOSIS — E11.9 TYPE 2 DIABETES MELLITUS WITHOUT COMPLICATION, WITH LONG-TERM CURRENT USE OF INSULIN (HCC): Primary | ICD-10-CM

## 2023-01-17 DIAGNOSIS — I10 ESSENTIAL HYPERTENSION: ICD-10-CM

## 2023-01-17 PROCEDURE — 3008F BODY MASS INDEX DOCD: CPT | Performed by: NURSE PRACTITIONER

## 2023-01-17 PROCEDURE — 3074F SYST BP LT 130 MM HG: CPT | Performed by: NURSE PRACTITIONER

## 2023-01-17 PROCEDURE — 3078F DIAST BP <80 MM HG: CPT | Performed by: NURSE PRACTITIONER

## 2023-01-17 PROCEDURE — 99214 OFFICE O/P EST MOD 30 MIN: CPT | Performed by: NURSE PRACTITIONER

## 2023-01-17 PROCEDURE — 95251 CONT GLUC MNTR ANALYSIS I&R: CPT | Performed by: NURSE PRACTITIONER

## 2023-01-17 RX ORDER — MELOXICAM 15 MG/1
TABLET ORAL
COMMUNITY
Start: 2023-01-16

## 2023-01-17 RX ORDER — ROSUVASTATIN CALCIUM 20 MG/1
20 TABLET, COATED ORAL NIGHTLY
Qty: 90 TABLET | Refills: 1 | Status: SHIPPED | OUTPATIENT
Start: 2023-01-17

## 2023-01-17 RX ORDER — INSULIN GLARGINE 300 U/ML
56 INJECTION, SOLUTION SUBCUTANEOUS DAILY
Qty: 18 ML | Refills: 2 | COMMUNITY
Start: 2023-01-17

## 2023-01-18 ENCOUNTER — OFFICE VISIT (OUTPATIENT)
Dept: PHYSICAL THERAPY | Age: 58
End: 2023-01-18
Attending: FAMILY MEDICINE
Payer: COMMERCIAL

## 2023-01-18 PROCEDURE — 97140 MANUAL THERAPY 1/> REGIONS: CPT

## 2023-01-18 PROCEDURE — 97110 THERAPEUTIC EXERCISES: CPT

## 2023-02-13 ENCOUNTER — OFFICE VISIT (OUTPATIENT)
Dept: PHYSICAL THERAPY | Age: 58
End: 2023-02-13
Attending: SPECIALIST/TECHNOLOGIST
Payer: COMMERCIAL

## 2023-02-13 PROCEDURE — 97110 THERAPEUTIC EXERCISES: CPT

## 2023-03-02 ENCOUNTER — OFFICE VISIT (OUTPATIENT)
Dept: PHYSICAL THERAPY | Age: 58
End: 2023-03-02
Attending: FAMILY MEDICINE
Payer: COMMERCIAL

## 2023-03-02 PROCEDURE — 97110 THERAPEUTIC EXERCISES: CPT

## 2023-03-02 PROCEDURE — 97140 MANUAL THERAPY 1/> REGIONS: CPT

## 2023-03-20 ENCOUNTER — HOSPITAL ENCOUNTER (OUTPATIENT)
Dept: CV DIAGNOSTICS | Facility: HOSPITAL | Age: 58
Discharge: HOME | End: 2023-03-20
Attending: INTERNAL MEDICINE
Payer: COMMERCIAL

## 2023-03-20 DIAGNOSIS — I71.21 ASCENDING AORTIC ANEURYSM (HCC): ICD-10-CM

## 2023-03-20 PROCEDURE — 93306 TTE W/DOPPLER COMPLETE: CPT | Performed by: INTERNAL MEDICINE

## 2023-03-23 ENCOUNTER — PATIENT MESSAGE (OUTPATIENT)
Dept: ENDOCRINOLOGY CLINIC | Facility: CLINIC | Age: 58
End: 2023-03-23

## 2023-03-24 RX ORDER — BLOOD-GLUCOSE SENSOR
1 EACH MISCELLANEOUS
Qty: 9 EACH | Refills: 2 | Status: SHIPPED | OUTPATIENT
Start: 2023-03-24

## 2023-03-24 NOTE — TELEPHONE ENCOUNTER
PA request in right fax on 03/10/2023. Please complete. Will pend G7 to provider. LOV: 01/17/2023  Future Appointments   Date Time Provider Prema Lesli   3/28/2023  5:45 PM Isabela Puckett PT [facility] PH[facility] T [place]   4/10/2023 12:30 PM Cher Aguilar, APN [facility]     Last A1c value was 6.4% done 1/12/2023.

## 2023-03-24 NOTE — TELEPHONE ENCOUNTER
From: Mittie Bamberger  To: PALMIRA Hannah  Sent: 3/23/2023 2:52 PM CDT  Subject: Ozempic and Dexcom 7    Hi FRANCISCA Cardoza needs approval for my Ozempic Prescription. I believe they need us to go through the [employer] approval process like we did about this time last year. Also, is the Dexcom 7 out and when if so can you put in a prescription. That is unless your recommend waiting.      Thanks,    Kuldeep Alvarez

## 2023-03-28 ENCOUNTER — OFFICE VISIT (OUTPATIENT)
Dept: PHYSICAL THERAPY | Age: 58
End: 2023-03-28
Attending: FAMILY MEDICINE
Payer: COMMERCIAL

## 2023-03-28 ENCOUNTER — TELEPHONE (OUTPATIENT)
Dept: PHYSICAL THERAPY | Facility: HOSPITAL | Age: 58
End: 2023-03-28

## 2023-03-28 PROCEDURE — 97140 MANUAL THERAPY 1/> REGIONS: CPT

## 2023-03-28 PROCEDURE — 97110 THERAPEUTIC EXERCISES: CPT

## 2023-04-06 ENCOUNTER — ORDER TRANSCRIPTION (OUTPATIENT)
Dept: PHYSICAL THERAPY | Facility: HOSPITAL | Age: 58
End: 2023-04-06

## 2023-04-06 DIAGNOSIS — Z98.890 S/P ARTHROSCOPY OF SHOULDER: Primary | ICD-10-CM

## 2023-04-10 ENCOUNTER — OFFICE VISIT (OUTPATIENT)
Dept: ENDOCRINOLOGY CLINIC | Facility: CLINIC | Age: 58
End: 2023-04-10
Payer: COMMERCIAL

## 2023-04-10 ENCOUNTER — TELEPHONE (OUTPATIENT)
Dept: ENDOCRINOLOGY CLINIC | Facility: CLINIC | Age: 58
End: 2023-04-10

## 2023-04-10 VITALS
RESPIRATION RATE: 16 BRPM | BODY MASS INDEX: 28.88 KG/M2 | HEART RATE: 73 BPM | HEIGHT: 67 IN | WEIGHT: 184 LBS | DIASTOLIC BLOOD PRESSURE: 80 MMHG | SYSTOLIC BLOOD PRESSURE: 130 MMHG

## 2023-04-10 DIAGNOSIS — E78.2 MIXED HYPERLIPIDEMIA: ICD-10-CM

## 2023-04-10 DIAGNOSIS — Z79.4 TYPE 2 DIABETES MELLITUS WITHOUT COMPLICATION, WITH LONG-TERM CURRENT USE OF INSULIN (HCC): Primary | ICD-10-CM

## 2023-04-10 DIAGNOSIS — E11.9 TYPE 2 DIABETES MELLITUS WITHOUT COMPLICATION, WITH LONG-TERM CURRENT USE OF INSULIN (HCC): Primary | ICD-10-CM

## 2023-04-10 DIAGNOSIS — I10 ESSENTIAL HYPERTENSION: ICD-10-CM

## 2023-04-10 LAB
CARTRIDGE LOT#: 30 NUMERIC
HEMOGLOBIN A1C: 7.1 % (ref 4.3–5.6)

## 2023-04-10 PROCEDURE — 99214 OFFICE O/P EST MOD 30 MIN: CPT | Performed by: NURSE PRACTITIONER

## 2023-04-10 PROCEDURE — 83036 HEMOGLOBIN GLYCOSYLATED A1C: CPT | Performed by: NURSE PRACTITIONER

## 2023-04-10 PROCEDURE — 3079F DIAST BP 80-89 MM HG: CPT | Performed by: NURSE PRACTITIONER

## 2023-04-10 PROCEDURE — 95251 CONT GLUC MNTR ANALYSIS I&R: CPT | Performed by: NURSE PRACTITIONER

## 2023-04-10 PROCEDURE — 3008F BODY MASS INDEX DOCD: CPT | Performed by: NURSE PRACTITIONER

## 2023-04-10 PROCEDURE — 3051F HG A1C>EQUAL 7.0%<8.0%: CPT | Performed by: FAMILY MEDICINE

## 2023-04-10 PROCEDURE — 3075F SYST BP GE 130 - 139MM HG: CPT | Performed by: NURSE PRACTITIONER

## 2023-04-10 RX ORDER — SEMAGLUTIDE 2.68 MG/ML
2 INJECTION, SOLUTION SUBCUTANEOUS WEEKLY
Qty: 3 ML | Refills: 0 | COMMUNITY
Start: 2023-04-10

## 2023-04-10 RX ORDER — LISINOPRIL 5 MG/1
5 TABLET ORAL DAILY
Qty: 30 TABLET | Refills: 1 | Status: SHIPPED | OUTPATIENT
Start: 2023-04-10

## 2023-04-10 NOTE — TELEPHONE ENCOUNTER
Started ozempic PA through [facility] via phone. Faxed chart notes to [phone].  Confirmation received

## 2023-04-21 ENCOUNTER — TELEPHONE (OUTPATIENT)
Dept: ORTHOPEDICS CLINIC | Facility: CLINIC | Age: 58
End: 2023-04-21

## 2023-04-21 DIAGNOSIS — M25.511 RIGHT SHOULDER PAIN, UNSPECIFIED CHRONICITY: Primary | ICD-10-CM

## 2023-04-21 NOTE — TELEPHONE ENCOUNTER
Per Cono-Clalo message patient is coming in for right shoulder pain. At this time patient has not had any imaging done since. Please place X-ray order accordingly, Please forward to [place] and help schedule xrays. Thank you.     Future Appointments   Date Time Provider Prema Ballesteros   5/4/2023  2:20 PM Bryce Brown MD [facility] ORTHO [id number]   5/30/2023  2:30 PM Nani Aguilar APN [facility]

## 2023-04-21 NOTE — TELEPHONE ENCOUNTER
LMOM in regards to patient scheduling appt with Dr. Julia Doll on 5/4/23. [facility] and complained of [facility] pain but had not specified which shoulder he would like to be seen for, advised pt to cb with specific shoulder to be serviced.

## 2023-05-03 DIAGNOSIS — I10 ESSENTIAL HYPERTENSION: ICD-10-CM

## 2023-05-03 RX ORDER — LISINOPRIL 5 MG/1
5 TABLET ORAL DAILY
Qty: 90 TABLET | Refills: 1 | Status: SHIPPED | OUTPATIENT
Start: 2023-05-03

## 2023-05-04 ENCOUNTER — HOSPITAL ENCOUNTER (OUTPATIENT)
Dept: GENERAL RADIOLOGY | Age: 58
Discharge: HOME | End: 2023-05-04
Attending: ORTHOPAEDIC SURGERY
Payer: COMMERCIAL

## 2023-05-04 ENCOUNTER — OFFICE VISIT (OUTPATIENT)
Dept: ORTHOPEDICS CLINIC | Facility: CLINIC | Age: 58
End: 2023-05-04
Payer: COMMERCIAL

## 2023-05-04 VITALS — WEIGHT: 184 LBS | BODY MASS INDEX: 28.88 KG/M2 | HEIGHT: 67 IN

## 2023-05-04 DIAGNOSIS — G89.29 CHRONIC RIGHT SHOULDER PAIN: ICD-10-CM

## 2023-05-04 DIAGNOSIS — E11.618 ADHESIVE CAPSULITIS OF RIGHT SHOULDER ASSOCIATED WITH TYPE 2 DIABETES MELLITUS (HCC): Primary | ICD-10-CM

## 2023-05-04 DIAGNOSIS — M75.01 ADHESIVE CAPSULITIS OF RIGHT SHOULDER ASSOCIATED WITH TYPE 2 DIABETES MELLITUS (HCC): Primary | ICD-10-CM

## 2023-05-04 DIAGNOSIS — M25.511 CHRONIC RIGHT SHOULDER PAIN: ICD-10-CM

## 2023-05-04 DIAGNOSIS — M25.511 RIGHT SHOULDER PAIN, UNSPECIFIED CHRONICITY: ICD-10-CM

## 2023-05-04 PROCEDURE — 73030 X-RAY EXAM OF SHOULDER: CPT | Performed by: ORTHOPAEDIC SURGERY

## 2023-05-04 RX ORDER — TRIAMCINOLONE ACETONIDE 40 MG/ML
40 INJECTION, SUSPENSION INTRA-ARTICULAR; INTRAMUSCULAR ONCE
Status: SHIPPED | OUTPATIENT
Start: 2023-05-04

## 2023-05-05 ENCOUNTER — OFFICE VISIT (OUTPATIENT)
Dept: FAMILY MEDICINE CLINIC | Facility: CLINIC | Age: 58
End: 2023-05-05
Payer: COMMERCIAL

## 2023-05-05 VITALS
DIASTOLIC BLOOD PRESSURE: 78 MMHG | SYSTOLIC BLOOD PRESSURE: 122 MMHG | HEIGHT: 67 IN | OXYGEN SATURATION: 98 % | BODY MASS INDEX: 28.56 KG/M2 | WEIGHT: 182 LBS | HEART RATE: 80 BPM | RESPIRATION RATE: 16 BRPM

## 2023-05-05 DIAGNOSIS — L29.9 ITCH: Primary | ICD-10-CM

## 2023-05-05 PROCEDURE — 3078F DIAST BP <80 MM HG: CPT | Performed by: FAMILY MEDICINE

## 2023-05-05 PROCEDURE — 3074F SYST BP LT 130 MM HG: CPT | Performed by: FAMILY MEDICINE

## 2023-05-05 PROCEDURE — 3008F BODY MASS INDEX DOCD: CPT | Performed by: FAMILY MEDICINE

## 2023-05-05 PROCEDURE — 99213 OFFICE O/P EST LOW 20 MIN: CPT | Performed by: FAMILY MEDICINE

## 2023-05-08 ENCOUNTER — ORDER TRANSCRIPTION (OUTPATIENT)
Dept: PHYSICAL THERAPY | Facility: HOSPITAL | Age: 58
End: 2023-05-08

## 2023-05-08 DIAGNOSIS — M75.01 ADHESIVE CAPSULITIS OF RIGHT SHOULDER ASSOCIATED WITH TYPE 2 DIABETES MELLITUS (HCC): ICD-10-CM

## 2023-05-08 DIAGNOSIS — G89.29 CHRONIC RIGHT SHOULDER PAIN: Primary | ICD-10-CM

## 2023-05-08 DIAGNOSIS — M25.511 CHRONIC RIGHT SHOULDER PAIN: Primary | ICD-10-CM

## 2023-05-08 DIAGNOSIS — E11.618 ADHESIVE CAPSULITIS OF RIGHT SHOULDER ASSOCIATED WITH TYPE 2 DIABETES MELLITUS (HCC): ICD-10-CM

## 2023-05-12 DIAGNOSIS — E11.69 TYPE 2 DIABETES MELLITUS WITH OTHER SPECIFIED COMPLICATION (HCC): ICD-10-CM

## 2023-05-12 DIAGNOSIS — I71.21 ANEURYSM OF THE ASCENDING AORTA, WITHOUT RUPTURE (HCC): ICD-10-CM

## 2023-05-12 DIAGNOSIS — E78.2 MIXED HYPERLIPIDEMIA: ICD-10-CM

## 2023-05-12 RX ORDER — ROSUVASTATIN CALCIUM 10 MG/1
TABLET, COATED ORAL
Qty: 90 TABLET | Refills: 0 | OUTPATIENT
Start: 2023-05-12

## 2023-05-16 RX ORDER — ROSUVASTATIN CALCIUM 20 MG/1
20 TABLET, COATED ORAL NIGHTLY
Qty: 90 TABLET | Refills: 1 | Status: SHIPPED | OUTPATIENT
Start: 2023-05-16

## 2023-05-16 NOTE — TELEPHONE ENCOUNTER
LOV: 04/10/2023  Future Appointments   Date Time Provider Prema Ballesteros   5/25/2023 11:30 AM Bernadene Shuck, PT [facility] [place]   5/30/2023  2:30 PM Dilshad Perla, APN [facility]   6/6/2023  3:45 PM Bernadene Shuck, PT [facility] PH[facility] T [place]   6/20/2023  5:00 PM Bernadene Shuck, PT [facility] PH[facility] T [place]   7/11/2023  5:00 PM Bernadene Shuck, PT [facility] PH[facility] T [place]   7/25/2023  5:00 PM Bernadene Shuck, PT [facility] PH[facility] [place]   8/8/2023  5:00 PM Bernadene Shuck, PT [facility] PH[facility] T [place]   8/22/2023  5:00 PM Bernadene Shuck, PT [facility] [place]   9/5/2023  5:00 PM Bernadene Shuck, PT [facility] PH[facility] T [place]   9/19/2023  5:00 PM Bernadene Shuck, PT [facility] PH[facility] T [place]   10/3/2023  5:15 PM Bernadene Shuck, PT [facility] PH[facility] T [place]     Last A1c value was 7.1% done 4/10/2023.

## 2023-05-22 ENCOUNTER — TELEPHONE (OUTPATIENT)
Dept: PHYSICAL THERAPY | Facility: HOSPITAL | Age: 58
End: 2023-05-22

## 2023-05-25 ENCOUNTER — OFFICE VISIT (OUTPATIENT)
Dept: PHYSICAL THERAPY | Age: 58
End: 2023-05-25
Attending: ORTHOPAEDIC SURGERY
Payer: COMMERCIAL

## 2023-05-25 DIAGNOSIS — M25.511 CHRONIC RIGHT SHOULDER PAIN: ICD-10-CM

## 2023-05-25 DIAGNOSIS — E11.618 ADHESIVE CAPSULITIS OF RIGHT SHOULDER ASSOCIATED WITH TYPE 2 DIABETES MELLITUS (HCC): ICD-10-CM

## 2023-05-25 DIAGNOSIS — G89.29 CHRONIC RIGHT SHOULDER PAIN: ICD-10-CM

## 2023-05-25 DIAGNOSIS — M75.01 ADHESIVE CAPSULITIS OF RIGHT SHOULDER ASSOCIATED WITH TYPE 2 DIABETES MELLITUS (HCC): ICD-10-CM

## 2023-05-25 PROCEDURE — 97140 MANUAL THERAPY 1/> REGIONS: CPT

## 2023-05-25 PROCEDURE — 97161 PT EVAL LOW COMPLEX 20 MIN: CPT

## 2023-05-25 PROCEDURE — 97110 THERAPEUTIC EXERCISES: CPT

## 2023-05-30 ENCOUNTER — OFFICE VISIT (OUTPATIENT)
Dept: ENDOCRINOLOGY CLINIC | Facility: CLINIC | Age: 58
End: 2023-05-30
Payer: COMMERCIAL

## 2023-05-30 VITALS
WEIGHT: 178.81 LBS | RESPIRATION RATE: 16 BRPM | DIASTOLIC BLOOD PRESSURE: 68 MMHG | SYSTOLIC BLOOD PRESSURE: 110 MMHG | HEART RATE: 86 BPM | BODY MASS INDEX: 28 KG/M2 | OXYGEN SATURATION: 96 %

## 2023-05-30 DIAGNOSIS — E11.9 TYPE 2 DIABETES MELLITUS WITHOUT COMPLICATION, WITH LONG-TERM CURRENT USE OF INSULIN (HCC): Primary | ICD-10-CM

## 2023-05-30 DIAGNOSIS — E78.2 MIXED HYPERLIPIDEMIA: ICD-10-CM

## 2023-05-30 DIAGNOSIS — Z79.4 TYPE 2 DIABETES MELLITUS WITHOUT COMPLICATION, WITH LONG-TERM CURRENT USE OF INSULIN (HCC): Primary | ICD-10-CM

## 2023-05-30 DIAGNOSIS — I10 ESSENTIAL HYPERTENSION: ICD-10-CM

## 2023-05-30 PROCEDURE — 95251 CONT GLUC MNTR ANALYSIS I&R: CPT | Performed by: NURSE PRACTITIONER

## 2023-05-30 PROCEDURE — 99214 OFFICE O/P EST MOD 30 MIN: CPT | Performed by: NURSE PRACTITIONER

## 2023-05-30 PROCEDURE — 3078F DIAST BP <80 MM HG: CPT | Performed by: NURSE PRACTITIONER

## 2023-05-30 PROCEDURE — 3074F SYST BP LT 130 MM HG: CPT | Performed by: NURSE PRACTITIONER

## 2023-05-30 RX ORDER — SEMAGLUTIDE 2.68 MG/ML
2 INJECTION, SOLUTION SUBCUTANEOUS WEEKLY
Qty: 9 ML | Refills: 1 | Status: SHIPPED | OUTPATIENT
Start: 2023-05-30

## 2023-06-06 ENCOUNTER — APPOINTMENT (OUTPATIENT)
Dept: PHYSICAL THERAPY | Age: 58
End: 2023-06-06
Attending: ORTHOPAEDIC SURGERY
Payer: COMMERCIAL

## 2023-06-20 ENCOUNTER — APPOINTMENT (OUTPATIENT)
Dept: PHYSICAL THERAPY | Age: 58
End: 2023-06-20
Attending: ORTHOPAEDIC SURGERY
Payer: COMMERCIAL

## 2023-07-11 ENCOUNTER — APPOINTMENT (OUTPATIENT)
Dept: PHYSICAL THERAPY | Age: 58
End: 2023-07-11
Attending: ORTHOPAEDIC SURGERY
Payer: COMMERCIAL

## 2023-07-25 ENCOUNTER — OFFICE VISIT (OUTPATIENT)
Dept: PHYSICAL THERAPY | Age: 58
End: 2023-07-25
Attending: ORTHOPAEDIC SURGERY
Payer: COMMERCIAL

## 2023-07-25 PROCEDURE — 97110 THERAPEUTIC EXERCISES: CPT

## 2023-07-25 PROCEDURE — 97140 MANUAL THERAPY 1/> REGIONS: CPT

## 2023-07-26 NOTE — PROGRESS NOTES
Diagnosis:  Chronic right shoulder pain (M25.511,G89.29)  Adhesive capsulitis of right shoulder associated with type 2 diabetes mellitus ([facility] [address] (W37.206,P08.72)   Referring Provider: Carlotta Lund  Date of Evaluation:   5/25/2023    Precautions: Insurance Primary/Secondary: [employer] / N/A       Total Timed Treatment: 40 min  Total Treatment time: 40 min   Charges: man 1 TE 2         Treatment Number: 2 of 8    Subjective: Pt with complaints of ongoing R shoulder pain, anterior> posterior. Had shoulder arthroscopic surgery 10/4/2022 (FREDDIE) and PT tx at this facility following surgery. Had some improvement but impingement pain pain persisted with throwing motions and quick arm movements. Pain could wake him. C/o of stiff shoulder at times. Had posterior shoulder injection due to slow progress, no better. Pt sought 2nd opinion and services of . Had anterior shoulder injection for suspected adhesive capsulitis. Some improvement noted with injection. Pt describes pain level current 0/10, at best 0/10, at worst 5/10. Current functional limitations include throwing, reaching behind back and overhead. Objective:    UBE CW and CCW R UE level 5 x 6 min total    Flexion: R 160; Abduction: R 155  ABD/ER R 80      Man PT: 10 min  R GH joint grade 3,4 mobilizations anterior, posterior and inferior glides     TE:  Contract relax stretch into horiz adduction x 4     PROM to end ranges for ER and IR in neutral, ABD/IR and ABD/ER, flexion and ABD.     Hook lying     Anterior capsule stretches on foam roller in hook lying at 100 and 110* abduction x 1-2 minutes holds each discharge HEP    B UE resting in 90/90 position, perform overhead ABD slides to 150 x 10 reps add HEP    Stand with back to wall    B UE  in 90/90 position, hands contact wall, perform overhead ABD slides to 140-150 x 10 reps add HEP    HEP: as noted above, HO to pt    Assessment: Pt better since injection but still remains with some daily aches, pains with sleeping on R side, reaching arm overhead and behind him. Flexion improved to 165 and abduction to 155, ABD/ER to 90, goals 2,3,4, following tx. Pectoralis muscle length now WNL after performing initial HEP which was issued several weeks ago. HEP was progressed to progress latissimus muscle length, allow improved upward scap rotation and overhead reaching, goals 2,3, 4. HO issued. Shoulder pain was 2/10 or < with AROM and tx. Goals: (to be met in10 visits)   Pt will report improved ability to sleep without waking due to shoulder pain  Pt will improve shoulder flexion AROM to > 160 degrees to be able to reach into overhead cabinets without pain or restriction  Pt will improve shoulder abduction AROM to >160 degrees to improve ability to perform overhead throwing and catching with recreational activities with little to no restrictions. Pt will increase shoulder AROM ABD/ER to 90 to reach and fasten seatbelt   Pt will demonstrate increased mid/low trap strength to >4/5 to promote improved shoulder mechanics and stabilization with ADL such as lifting and reaching, quick arm motions with little to no difficulty. Pt will be independent and compliant with comprehensive HEP to maintain progress achieved in PT     Plan: Progress accordingly to meet goals.

## 2023-08-08 ENCOUNTER — OFFICE VISIT (OUTPATIENT)
Dept: PHYSICAL THERAPY | Age: 58
End: 2023-08-08
Attending: ORTHOPAEDIC SURGERY
Payer: COMMERCIAL

## 2023-08-08 PROCEDURE — 97110 THERAPEUTIC EXERCISES: CPT

## 2023-08-08 PROCEDURE — 97140 MANUAL THERAPY 1/> REGIONS: CPT

## 2023-08-08 NOTE — PROGRESS NOTES
Diagnosis:  Chronic right shoulder pain (M25.511,G89.29)  Adhesive capsulitis of right shoulder associated with type 2 diabetes mellitus ([facility] [address] (B00.588,W27.15)   Referring Provider: Inessa Abdi  Date of Evaluation:   5/25/2023    Precautions: Insurance Primary/Secondary: [employer] / N/A       Total Timed Treatment: 40 min  Total Treatment time: 40 min   Charges: man 1 TE 2         Treatment Number: 3 of 8    Subjective: Pt reports some continues improvement since last seen. Impingemnt pain less frequent, better shoulder mobility and is able to sleep on R side at times w/o waking from pain. Pt describes pain level current 0/10, at best 0/10, at worst 5/10. Current functional limitations include throwing, reaching behind back and overhead. Objective:    UBE CW and CCW R UE level 5 progressed to 8 x 6 progressed to 8  min total     Man PT: 10 min  R GH joint grade 3,4 mobilizations anterior, posterior and inferior glides     TE:  Contract relax stretch into horiz adduction x 4     PROM to end ranges for ER and IR in neutral, ABD/IR and ABD/ER, flexion and ABD. Hook lying    B UE resting in 90/90 position, perform overhead ABD slides to 150 x 10 reps add HEP    Prone:  B shoulder ABD/ER with scap retract 203 sec hold x 15 reps    Stand with back to wall    B UE  in 90/90 position, hands contact wall, perform overhead ABD slides to 140-150 x 10 reps HEP    Rebounder:  -#2 ball with overhead throwing xx 20 reps  -#2 ball with sidearm throwing, simulate frisbee throw x 20 reps    HEP: as noted above, HO to pt    Assessment:  Pt continues to report some improvement with shoulder as noted in subjective, goals 1. Following man PT and ROM exercises Flexion improved to 170 and abduction to 160, ABD/ER to 95,  goals 2,3,4, following tx. Progressed to tx to assess pain with throwing which was original mechanism of injury. No pain today, improved. No pain throughout tx today.        Goals: (to be met in10 visits)   Pt will report improved ability to sleep without waking due to shoulder pain  Pt will improve shoulder flexion AROM to > 160 degrees to be able to reach into overhead cabinets without pain or restriction  Pt will improve shoulder abduction AROM to >160 degrees to improve ability to perform overhead throwing and catching with recreational activities with little to no restrictions. Pt will increase shoulder AROM ABD/ER to 90 to reach and fasten seatbelt   Pt will demonstrate increased mid/low trap strength to >4/5 to promote improved shoulder mechanics and stabilization with ADL such as lifting and reaching, quick arm motions with little to no difficulty. Pt will be independent and compliant with comprehensive HEP to maintain progress achieved in PT     Plan: Progress accordingly to meet goals.

## 2023-08-22 ENCOUNTER — OFFICE VISIT (OUTPATIENT)
Dept: PHYSICAL THERAPY | Age: 58
End: 2023-08-22
Attending: ORTHOPAEDIC SURGERY
Payer: COMMERCIAL

## 2023-08-22 PROCEDURE — 97140 MANUAL THERAPY 1/> REGIONS: CPT

## 2023-08-22 PROCEDURE — 97110 THERAPEUTIC EXERCISES: CPT

## 2023-08-22 NOTE — PROGRESS NOTES
Diagnosis:  Chronic right shoulder pain (M25.511,G89.29)  Adhesive capsulitis of right shoulder associated with type 2 diabetes mellitus ([facility] [address] (J29.962,T73.09)   Referring Provider: Maurice Diaz  Date of Evaluation:   5/25/2023    Precautions: Insurance Primary/Secondary: [employer] / N/A       Total Timed Treatment: 40 min  Total Treatment time: 40 min   Charges: man 1 TE 2         Treatment Number: 4 of 8    Subjective: Pt has been able to return to throwing Frisbee w/o any impingement pain. No pain following  throwing. Improving. Sleeping most nights w/o pain. Pt describes pain level current 0/10, at best 0/10, at worst 1-2/10. Current functional limitations include throwing, reaching behind back and overhead. Objective:    UBE CW and CCW R UE level 5 progressed to 8 x 6 progressed to 8  min total     Man PT: 10 min  R GH joint grade 3,4 mobilizations anterior, posterior and inferior glides     TE:  Contract relax stretch into horiz adduction x 4     PROM to end ranges for ER and IR in neutral, ABD/IR and ABD/ER, flexion and ABD. Hook lying    B UE resting in 90/90 position, perform overhead ABD slides to 150 x 10 reps add HEP    Prone:  B shoulder ABD/ER with scap retract 3 sec hold x 15 reps  B mid trap and low trap row x 15 each    Stand with back to wall    B UE  in 90/90 position, hands contact wall, perform overhead ABD slides to 140-150 x 10 reps HEP    Rebounder:  -#2 ball with overhead throwing xx 20 reps  -#2 ball with sidearm throwing, simulate frisbee throw x 20 reps    HEP: as noted above, HO to pt    Assessment:  Pt now above to throw frisbee for frisbee golf w/o pain. That was the original mechanism of injury > 1 year ago and has not been able to perform throwing since, goals 3,5. Pain with sleeping nearly resolved, goal 1. Maintains flexion to 170 and abduction to 160, ABD/ER to 95,  goals 2,3,4, following tx. No pain throughout tx today. Mid and low trap MMT > 4/5,goal 5. On target for all goals at this time. Goals: (to be met in10 visits)   Pt will report improved ability to sleep without waking due to shoulder pain  Pt will improve shoulder flexion AROM to > 160 degrees to be able to reach into overhead cabinets without pain or restriction  Pt will improve shoulder abduction AROM to >160 degrees to improve ability to perform overhead throwing and catching with recreational activities with little to no restrictions. Pt will increase shoulder AROM ABD/ER to 90 to reach and fasten seatbelt   Pt will demonstrate increased mid/low trap strength to >4/5 to promote improved shoulder mechanics and stabilization with ADL such as lifting and reaching, quick arm motions with little to no difficulty. Pt will be independent and compliant with comprehensive HEP to maintain progress achieved in PT     Plan: Progress accordingly to meet goals.

## 2023-08-29 ENCOUNTER — OFFICE VISIT (OUTPATIENT)
Dept: ENDOCRINOLOGY CLINIC | Facility: CLINIC | Age: 58
End: 2023-08-29
Payer: COMMERCIAL

## 2023-08-29 VITALS
HEART RATE: 75 BPM | SYSTOLIC BLOOD PRESSURE: 118 MMHG | DIASTOLIC BLOOD PRESSURE: 64 MMHG | RESPIRATION RATE: 17 BRPM | BODY MASS INDEX: 28 KG/M2 | WEIGHT: 175.81 LBS | OXYGEN SATURATION: 97 %

## 2023-08-29 DIAGNOSIS — I10 ESSENTIAL HYPERTENSION: ICD-10-CM

## 2023-08-29 DIAGNOSIS — E11.9 TYPE 2 DIABETES MELLITUS WITHOUT COMPLICATION, WITH LONG-TERM CURRENT USE OF INSULIN (HCC): Primary | ICD-10-CM

## 2023-08-29 DIAGNOSIS — E78.2 MIXED HYPERLIPIDEMIA: ICD-10-CM

## 2023-08-29 DIAGNOSIS — Z79.4 TYPE 2 DIABETES MELLITUS WITHOUT COMPLICATION, WITH LONG-TERM CURRENT USE OF INSULIN (HCC): Primary | ICD-10-CM

## 2023-08-29 LAB
CARTRIDGE LOT#: 603 NUMERIC
HEMOGLOBIN A1C: 6.2 % (ref 4.3–5.6)

## 2023-08-29 PROCEDURE — 83036 HEMOGLOBIN GLYCOSYLATED A1C: CPT | Performed by: NURSE PRACTITIONER

## 2023-08-29 PROCEDURE — 95251 CONT GLUC MNTR ANALYSIS I&R: CPT | Performed by: NURSE PRACTITIONER

## 2023-08-29 PROCEDURE — 99214 OFFICE O/P EST MOD 30 MIN: CPT | Performed by: NURSE PRACTITIONER

## 2023-08-29 PROCEDURE — 3074F SYST BP LT 130 MM HG: CPT | Performed by: NURSE PRACTITIONER

## 2023-08-29 PROCEDURE — 3078F DIAST BP <80 MM HG: CPT | Performed by: NURSE PRACTITIONER

## 2023-08-29 RX ORDER — INSULIN LISPRO-AABC 100 [IU]/ML
INJECTION, SOLUTION SUBCUTANEOUS
Qty: 6 ML | Refills: 0 | COMMUNITY
Start: 2023-08-29

## 2023-09-05 ENCOUNTER — OFFICE VISIT (OUTPATIENT)
Dept: PHYSICAL THERAPY | Age: 58
End: 2023-09-05
Attending: ORTHOPAEDIC SURGERY
Payer: COMMERCIAL

## 2023-09-05 PROCEDURE — 97110 THERAPEUTIC EXERCISES: CPT

## 2023-09-05 PROCEDURE — 97140 MANUAL THERAPY 1/> REGIONS: CPT

## 2023-09-05 NOTE — PROGRESS NOTES
Diagnosis:  Chronic right shoulder pain (M25.511,G89.29)  Adhesive capsulitis of right shoulder associated with type 2 diabetes mellitus [facility] (X80.756,V72.99)   Referring Provider: Inessa Abdi  Date of Evaluation:   5/25/2023    Precautions: Insurance Primary/Secondary: [employer] / N/A       Total Timed Treatment: 40 min  Total Treatment time: 40 min   Charges: man 1 TE 2         Treatment Number: 5 of 8    Subjective: Pt has been able to return to throwing Frisbee w/o any impingement pain. No pain following  throwing. Improving. Sleeping most nights w/o pain. Pt describes pain level current 0/10, at best 0/10, at worst 1-2/10. Current functional limitations include throwing, reaching behind back and overhead. Objective:    UBE CW and CCW R UE level 5 progressed to 8 x 6 progressed to 8  min total     Man PT: 10 min  R GH joint grade 3,4 mobilizations anterior, posterior and inferior glides     TE:  Contract relax stretch into horiz adduction x 4     PROM to end ranges for ER and IR in neutral, ABD/IR and ABD/ER, flexion and ABD. Hook lying    B UE resting in 90/90 position, perform overhead ABD slides to 150 x 10 reps HEP    Prone:  B shoulder ABD/ER with scap retract 3 sec hold x 15 reps  B mid trap and low trap row x 15 each    Stand with back to wall    B UE  in 90/90 position, hands contact wall, perform overhead ABD slides to 140-150 x 10 reps HEP    Rebounder:  -#2 ball with overhead throwing xx 20 reps  -#2 ball with sidearm throwing, simulate frisbee throw x 20 reps    HEP: as noted above, HO to pt    Assessment:  Pt continues to be able to throw Frisbee w/o pain. . That was the original mechanism of injury > 1 year ago and has not been able to perform throwing since, goals 3,5. Pain with sleeping nearly resolved, goal 1. Maintains flexion to 170 and abduction to 160, ABD/ER to 95,  goals 2,3,4, following tx. No pain throughout tx today. Mid and low trap MMT > 4/5,goal 5.   On target for all goals at this time. Goals: (to be met in10 visits)   Pt will report improved ability to sleep without waking due to shoulder pain  Pt will improve shoulder flexion AROM to > 160 degrees to be able to reach into overhead cabinets without pain or restriction  Pt will improve shoulder abduction AROM to >160 degrees to improve ability to perform overhead throwing and catching with recreational activities with little to no restrictions. Pt will increase shoulder AROM ABD/ER to 90 to reach and fasten seatbelt   Pt will demonstrate increased mid/low trap strength to >4/5 to promote improved shoulder mechanics and stabilization with ADL such as lifting and reaching, quick arm motions with little to no difficulty. Pt will be independent and compliant with comprehensive HEP to maintain progress achieved in PT     Plan: Progress accordingly to meet goals.

## 2023-09-19 ENCOUNTER — APPOINTMENT (OUTPATIENT)
Dept: PHYSICAL THERAPY | Age: 58
End: 2023-09-19
Attending: ORTHOPAEDIC SURGERY
Payer: COMMERCIAL

## 2023-09-26 ENCOUNTER — APPOINTMENT (OUTPATIENT)
Dept: PHYSICAL THERAPY | Age: 58
End: 2023-09-26
Attending: FAMILY MEDICINE
Payer: COMMERCIAL

## 2023-10-03 ENCOUNTER — APPOINTMENT (OUTPATIENT)
Dept: PHYSICAL THERAPY | Age: 58
End: 2023-10-03
Attending: ORTHOPAEDIC SURGERY
Payer: COMMERCIAL

## 2023-11-02 DIAGNOSIS — I10 ESSENTIAL HYPERTENSION: ICD-10-CM

## 2023-11-02 RX ORDER — LISINOPRIL 5 MG/1
5 TABLET ORAL DAILY
Qty: 90 TABLET | Refills: 1 | Status: SHIPPED | OUTPATIENT
Start: 2023-11-02

## 2023-11-02 NOTE — TELEPHONE ENCOUNTER
Requested Prescriptions     Pending Prescriptions Disp Refills    LISINOPRIL 5 MG Oral Tab [Pharmacy Med Name: LISINOPRIL 5 MG TABLET] 90 tablet 1     Sig: TAKE 1 TABLET (5 MG TOTAL) BY MOUTH DAILY.     Your appointments       Date & Time Appointment Department ([place])    Dec 19, 2023 11:15 AM CST Diabetes Pump follow up with Nani Aguilar APRN [address] ([facility] [place])              [address] [facility] [place]  [address] [id number] [phone]          Last A1c value was 6.2% done 8/29/2023.    Refill 5/03/23  LOV 8/29/23

## 2023-11-08 ENCOUNTER — TELEPHONE (OUTPATIENT)
Dept: ENDOCRINOLOGY CLINIC | Facility: CLINIC | Age: 58
End: 2023-11-08

## 2023-11-08 DIAGNOSIS — Z79.4 TYPE 2 DIABETES MELLITUS WITHOUT COMPLICATION, WITH LONG-TERM CURRENT USE OF INSULIN (HCC): Primary | ICD-10-CM

## 2023-11-08 DIAGNOSIS — E11.9 TYPE 2 DIABETES MELLITUS WITHOUT COMPLICATION, WITH LONG-TERM CURRENT USE OF INSULIN (HCC): Primary | ICD-10-CM

## 2023-11-08 NOTE — TELEPHONE ENCOUNTER
Received fax from [facility] that ozempic 2mg is on backorder, sent pt [facility]

## 2023-11-10 RX ORDER — SEMAGLUTIDE 1.34 MG/ML
1 INJECTION, SOLUTION SUBCUTANEOUS WEEKLY
Qty: 3 ML | Refills: 0 | Status: SHIPPED | OUTPATIENT
Start: 2023-11-10

## 2024-01-02 DIAGNOSIS — E11.9 TYPE 2 DIABETES MELLITUS WITHOUT COMPLICATION, WITH LONG-TERM CURRENT USE OF INSULIN (HCC): Primary | ICD-10-CM

## 2024-01-02 DIAGNOSIS — Z79.4 TYPE 2 DIABETES MELLITUS WITHOUT COMPLICATION, WITH LONG-TERM CURRENT USE OF INSULIN (HCC): Primary | ICD-10-CM

## 2024-01-02 DIAGNOSIS — E78.2 MIXED HYPERLIPIDEMIA: ICD-10-CM

## 2024-01-02 RX ORDER — ROSUVASTATIN CALCIUM 20 MG/1
20 TABLET, COATED ORAL NIGHTLY
Qty: 90 TABLET | Refills: 1 | Status: SHIPPED | OUTPATIENT
Start: 2024-01-02

## 2024-01-02 RX ORDER — ACYCLOVIR 400 MG/1
1 TABLET ORAL
Qty: 9 EACH | Refills: 3 | Status: SHIPPED | OUTPATIENT
Start: 2024-01-02

## 2024-01-02 NOTE — TELEPHONE ENCOUNTER
Requested Prescriptions     Pending Prescriptions Disp Refills    ROSUVASTATIN 20 MG Oral Tab [Pharmacy Med Name: ROSUVASTATIN CALCIUM 20 MG TAB] 90 tablet 1     Sig: TAKE 1 TABLET BY MOUTH EVERY DAY AT NIGHT    DEXCOM G7 SENSOR Does not apply Misc [Pharmacy Med Name: DEXCOM G7 SENSOR]  2     Si EACH EVERY 10 DAYS.     Future Appointments   Date Time Provider Department Center   2024 12:30 PM Nani Aguilar APRN [id number]     Last A1c value was 6.2% done 2023.  Refill 11/10/23  LOV 23

## 2024-01-16 DIAGNOSIS — Z79.4 TYPE 2 DIABETES MELLITUS WITHOUT COMPLICATION, WITH LONG-TERM CURRENT USE OF INSULIN (HCC): ICD-10-CM

## 2024-01-16 DIAGNOSIS — E11.9 TYPE 2 DIABETES MELLITUS WITHOUT COMPLICATION, WITH LONG-TERM CURRENT USE OF INSULIN (HCC): ICD-10-CM

## 2024-01-16 RX ORDER — INSULIN GLARGINE 300 U/ML
50 INJECTION, SOLUTION SUBCUTANEOUS DAILY
Qty: 18 ML | Refills: 2 | Status: SHIPPED | OUTPATIENT
Start: 2024-01-16 | End: 2024-01-18

## 2024-01-16 NOTE — TELEPHONE ENCOUNTER
Received fax from [facility] for rx request. Pended medication.     Requested Prescriptions     Pending Prescriptions Disp Refills    Insulin Glargine, 2 Unit Dial, (TOUJEO MAX SOLOSTAR) 300 UNIT/ML Subcutaneous Solution Pen-injector 18 mL 2     Sig: Inject 50 Units into the skin daily.     Your appointments       Date & Time Appointment Department ([place])    Feb 22, 2024 12:30 PM [facility] Diabetes Pump follow up with Nani Aguilar APRN [facility] [address] ([facility] [place])              [facility] [address] [facility] [place]  [address] [facility] [phone]          HGBA1C:    Lab Results   Component Value Date    A1C 6.2 (A) 08/29/2023    A1C 7.1 (A) 04/10/2023    A1C 6.4 (H) 01/12/2023     (H) 07/28/2021     LOV: 8-29-23    REFILL: 1-17-23

## 2024-01-18 RX ORDER — INSULIN GLARGINE 300 U/ML
50 INJECTION, SOLUTION SUBCUTANEOUS DAILY
Qty: 18 ML | Refills: 2 | Status: SHIPPED | OUTPATIENT
Start: 2024-01-18

## 2024-01-18 NOTE — TELEPHONE ENCOUNTER
Received another fax about refill - the pharmacy requesting this for the patient was actually the [facility]  in [place], [place] not pt's home pharmacy. Pended to correct pharmacy

## 2024-03-19 DIAGNOSIS — Z79.4 TYPE 2 DIABETES MELLITUS WITHOUT COMPLICATION, WITH LONG-TERM CURRENT USE OF INSULIN (HCC): ICD-10-CM

## 2024-03-19 DIAGNOSIS — E11.9 TYPE 2 DIABETES MELLITUS WITHOUT COMPLICATION, WITH LONG-TERM CURRENT USE OF INSULIN (HCC): ICD-10-CM

## 2024-03-19 RX ORDER — SEMAGLUTIDE 2.68 MG/ML
2 INJECTION, SOLUTION SUBCUTANEOUS WEEKLY
Qty: 9 ML | Refills: 1 | Status: SHIPPED | OUTPATIENT
Start: 2024-03-19

## 2024-03-19 NOTE — TELEPHONE ENCOUNTER
Requested Prescriptions     Pending Prescriptions Disp Refills    OZEMPIC, 2 MG/DOSE, 8 MG/3ML Subcutaneous Solution Pen-injector [Pharmacy Med Name: OZEMPIC 8 MG/3 ML (2 MG/DOSE)]  1     Sig: INJECT 2 MG INTO THE SKIN ONCE A WEEK.     Your appointments       Date & Time Appointment Department ([place])    Apr 22, 2024 12:30 PM CDT Diabetes Pump follow up with Nani Aguilar APRN [address] ([facility] [place])              [facility] [address] [facility] [place]  [address] [facility] [phone] [phone]          Last A1c value was 6.2% done 8/29/2023.    Refill 11/10/23  LOV 8/29/23

## 2024-04-22 ENCOUNTER — OFFICE VISIT (OUTPATIENT)
Dept: ENDOCRINOLOGY CLINIC | Facility: CLINIC | Age: 59
End: 2024-04-22
Payer: COMMERCIAL

## 2024-04-22 VITALS
HEART RATE: 76 BPM | RESPIRATION RATE: 16 BRPM | BODY MASS INDEX: 30 KG/M2 | DIASTOLIC BLOOD PRESSURE: 72 MMHG | SYSTOLIC BLOOD PRESSURE: 120 MMHG | OXYGEN SATURATION: 98 % | WEIGHT: 188.81 LBS

## 2024-04-22 DIAGNOSIS — E78.2 MIXED HYPERLIPIDEMIA: ICD-10-CM

## 2024-04-22 DIAGNOSIS — I10 ESSENTIAL HYPERTENSION: ICD-10-CM

## 2024-04-22 DIAGNOSIS — Z79.4 TYPE 2 DIABETES MELLITUS WITHOUT COMPLICATION, WITH LONG-TERM CURRENT USE OF INSULIN (HCC): Primary | ICD-10-CM

## 2024-04-22 DIAGNOSIS — E11.9 TYPE 2 DIABETES MELLITUS WITHOUT COMPLICATION, WITH LONG-TERM CURRENT USE OF INSULIN (HCC): Primary | ICD-10-CM

## 2024-04-22 LAB
CARTRIDGE LOT#: 660 NUMERIC
HEMOGLOBIN A1C: 7.7 % (ref 4.3–5.6)

## 2024-04-22 RX ORDER — INSULIN GLARGINE 300 U/ML
INJECTION, SOLUTION SUBCUTANEOUS
COMMUNITY
Start: 2024-04-22

## 2024-04-22 RX ORDER — INSULIN LISPRO-AABC 100 [IU]/ML
INJECTION, SOLUTION SUBCUTANEOUS
Qty: 15 ML | Refills: 0 | Status: SHIPPED | OUTPATIENT
Start: 2024-04-22

## 2024-04-22 NOTE — PROGRESS NOTES
HPI:   Chester [place] is a 59 year old male who presents for follow up for the management of his diabetes.     Chief Complaint   Patient presents with    Diabetes     Follow up (dexcom)      Patient is using personal continuous glucose monitoring or would be testing BGs at least 4 times per day.  Patient is on at least 3 insulin injections per day.   Patient is making self-adjustments in insulin according to blood sugars or carbs.    Diabetes: needs improvement  Type: 2   Duration:dx  - was initially dx as type 2 then was told he had type 1 after staph infection - DALY and ISLET cell antibodies negative  Current Meds: Ozempic 2mg injection weekly, Toujeo 52 units injection daily,  Lyumjev sliding scale: 200-250 mg/dl = 2 units, 251-300 mg/dl = 4 units, 301-350 mg/dl = 6 units, > 350 mg/dl = 8 units tid w/meals   SE: denies  Long term insulin use: yes  Failed Medications: Lantus, Novolog, Levemir, Januvia, Metformin, Toujeo   Complications: Proteinuria, CAD    Personal  Dexcom CGM  Analysis of data: 2024-2024  % Time CGM is Active: 78%  Sensor download: full report  in media  Average glucose : 178 mg/dl     Standard deviation: 45 mg/dl   CV (coefficient of variation) : 25.3%     38% time above 180mg /dl   7% time above 250 mg/dl  55% time in target range:  mg/dl   0% time below target range: 70mg/dl     Evaluation   1. Baseline glucose stable but elevated  2. Occasional postprandial elevation with return to baseline  3. Low likelihood of hypoglycemia  4. Normal glucose variability      Overall glucose control:   HGBA1C:    Lab Results   Component Value Date    A1C 7.7 (A) 2024    A1C 6.2 (A) 2023    A1C 7.1 (A) 04/10/2023     (H) 2021       Hypertension: stable, at goal  Blood Pressure: 120/72   Medication prescribed: lisinopril - is not taking  SE: lightheadedness when taking lisinopril     Hyperlipidemia: needs improvement  LDL: 129   Tri  Medication prescribed:  rousvastatin, Omega 3  SE: denies     Wt Readings from Last 3 Encounters:   04/22/24 188 lb 12.8 oz (85.6 kg)   08/29/23 175 lb 12.8 oz (79.7 kg)   05/30/23 178 lb 12.8 oz (81.1 kg)     BP Readings from Last 3 Encounters:   04/22/24 120/72   08/29/23 118/64   05/30/23 110/68          Past History:   He  has a past medical history of Ascending aortic aneurysm (HCC), Back problem, Cardiovascular function study, abnormal, Coronary atherosclerosis, Essential hypertension (8/3/2021), Gall stone, High blood pressure, High cholesterol, Kidney stone (7/29/2020), and Type 2 diabetes mellitus with microalbuminuria, with long-term current use of insulin ([facility]).   His family history includes Dementia in his maternal grandmother; Diabetes in his father; Heart Disorder in his mother; High Cholesterol in his father; Obesity in his father; Other in his father.   He  reports that he has never smoked. He has never used smokeless tobacco. He reports current alcohol use. He reports that he does not use drugs.     He has No Known Allergies.     Current Outpatient Medications on File Prior to Visit   Medication Sig    Insulin Glargine, 2 Unit Dial, (TOUJEO MAX SOLOSTAR) 300 UNIT/ML Subcutaneous Solution Pen-injector Inject daily as directed up to TDD = 50 units    semaglutide (OZEMPIC, 2 MG/DOSE,) 8 MG/3ML Subcutaneous Solution Pen-injector INJECT 2 MG INTO THE SKIN ONCE A WEEK.    ROSUVASTATIN 20 MG Oral Tab TAKE 1 TABLET BY MOUTH EVERY DAY AT NIGHT    Continuous Blood Gluc Sensor (DEXCOM G7 SENSOR) Does not apply Misc 1 EACH EVERY 10 DAYS.    Magnesium 100 MG Oral Tab Take by mouth daily.      Omega-3 Fatty Acids (FISH OIL) 435 MG Oral Cap Take by mouth daily.      B Complex Vitamins (VITAMIN B COMPLEX) Oral Tab Take by mouth daily.      aspirin 81 MG Oral Chew Tab Chew 1 tablet (81 mg total) by mouth daily. Take 4 am of procedure    [DISCONTINUED] Insulin Glargine, 2 Unit Dial, (TOUJEO MAX SOLOSTAR) 300 UNIT/ML Subcutaneous Solution  Pen-injector Inject 50 Units into the skin daily.    [DISCONTINUED] semaglutide (OZEMPIC, 1 MG/DOSE,) 4 MG/3ML Subcutaneous Solution Pen-injector Inject 1 mg into the skin once a week.    [DISCONTINUED] Insulin Lispro-aabc, 1 U Dial, (LYUMJEV KWIKPEN) 100 UNIT/ML Subcutaneous Solution Pen-injector Inject using sliding scale as directed 3x/day up to total daily dose of 30 units    Insulin Pen Needle (NOVOFINE PLUS) 32G X 4 MM Does not apply Misc Inject 1 each into the skin daily.     No current facility-administered medications on file prior to visit.       CMP  (most recent labs)   Lab Results   Component Value Date/Time     (H) 01/12/2023 09:25 AM    BUN 14 01/12/2023 09:25 AM    CREATSERUM 0.97 01/12/2023 09:25 AM    GFRNAA 82 12/08/2021 10:04 AM    GFRAA 95 12/08/2021 10:04 AM    EGFRCR 92 01/12/2023 09:25 AM    CA 9.9 01/12/2023 09:25 AM    ALKPHO 82 01/12/2023 09:25 AM    AST 20 01/12/2023 09:25 AM    ALT 24 01/12/2023 09:25 AM    BILT 1.1 01/12/2023 09:25 AM    TP 7.2 01/12/2023 09:25 AM    ALB 4.9 01/12/2023 09:25 AM     01/12/2023 09:25 AM    K 3.8 01/12/2023 09:25 AM     01/12/2023 09:25 AM    CO2 23 01/12/2023 09:25 AM           Lipids  (most recent labs)   Lab Results   Component Value Date/Time    CHOLEST 204 (H) 01/12/2023 09:25 AM    TRIG 155 (H) 01/12/2023 09:25 AM    HDL 48 01/12/2023 09:25 AM     (H) 01/12/2023 09:25 AM    NONHDLC 156 (H) 01/12/2023 09:25 AM          Diabetes  (most recent labs)   Lab Results   Component Value Date/Time    A1C 7.7 (A) 04/22/2024 01:03 PM          Microalb (most recent labs)   Lab Results   Component Value Date/Time    MICROALBUMIN 3.1 01/12/2023 09:25 AM    MICROALBCREA 26.6 07/18/2022 03:09 PM        Lab results reviewed with patient.  Updated Lipid results available from [employer] [facility]    REVIEW OF SYSTEMS:   GENERAL HEALTH: feels well otherwise  SKIN: denies any unusual skin lesions or rashes  RESPIRATORY: denies shortness of breath with  exertion  CARDIOVASCULAR: denies chest pain on exertion  GI: denies nausea, abdominal pain or heartburn  NEURO: denies headaches and denies numbness and tingling to extremities  ENDO: denies polydipsia, polyuria or polyphagia, denies unexplained weight changes    EXAM:   /72   Pulse 76   Resp 16   Wt 188 lb 12.8 oz (85.6 kg)   SpO2 98%   BMI 29.57 kg/m²  Estimated body mass index is 29.57 kg/m² as calculated from the following:    Height as of 5/5/23: 5' 7\" (1.702 m).    Weight as of this encounter: 188 lb 12.8 oz (85.6 kg).   Physical Exam  Vitals reviewed.   Pulmonary:      Effort: Pulmonary effort is normal.   Neurological:      Mental Status: He is alert and oriented to person, place, and time.   Psychiatric:         Mood and Affect: Mood normal.         Behavior: Behavior normal.         ASSESSMENT AND PLAN:   As for his Diabetes, it is no significant medication side effects noted, needs further observation, needs improvement, needs to follow diet more regularly.     Recommendations are: continue present meds, lose weight by increased dietary compliance and exercise and check feet daily.    Patient to continue Ozempic 2mg injection weekly and Lyumjev sliding scale: 200-250 mg/dl = 2 units, 251-300 mg/dl = 4 units, 301-350 mg/dl = 6 units, > 350 mg/dl = 8 units tid w/meals.  Add additional 2 units of Lyumjev at dinner if eating meal higher in carbohydrates.  Increase Toujeo to 54 units injection daily.    Per ADA guidelines, in patients with type 2 diabetes and established ASCVD, incorporating agent proven to reduce major adverse CV events and CV mortality   GLP-1 Ozempic rx chosen since it not only lowers A1C, but studies have shown it can also reduce the risk of major CV events such as heart attack, stroke, or death in adults with type 2 diabetes who are currently treating their CV disease.  Patient to continue to use personal Dexcom G7 CGM for glucose monitoring.  Discussed self monitoring blood  glucose and the importance of monitoring.  Patient agreed to monitoring qid - before meals and 2 hours postprandial of one meal per day if not using CGM.  Hypoglycemia S&S, Rx, and when to call APRN/CDE reviewed using Rule of 15's. Stressed need to call if 2 readings below 80 mg/dl in 1 week for medication adjustment.   Reinforced healthy eating in healthy portions and increasing daily physical activity.  Patient to have labs done prior to next appointment.       As for his hypertension, Blood Pressure is well controlled, stable.   PLAN: reviewed diet, exercise and weight control, remain off of lisinopril at this time, and labs as ordered       As for his cholesterol, Lipids are no significant medication side effects noted, needs further observation, needs improvement, needs to follow diet more regularly.   PLAN: will continue present medications, reviewed diet, exercise and weight control, and labs as ordered      DM Health Maintenance  Last dilated eye exam: No data recorded   Exam shows retinopathy? No data recorded    Last diabetic foot exam: No data recorded    Date of last PHQ-2 depression screen: PHQ-2 - Date of last depression screenin2024      Patient  reports that he has never smoked. He has never used smokeless tobacco.  When is flu vaccine due? No recommendations at this time  When is pneumonia vaccine due? No recommendations at this time    The patient indicates understanding of these issues and agrees to the plan.  Refills addressed at time of office visit.    Diagnoses and all orders for this visit:    Type 2 diabetes mellitus without complication, with long-term current use of insulin (HCC)  -     HEMOGLOBIN A1C  -     MICROALB/CREAT RATIO, RANDOM URINE [2174] [Q]  -     Insulin Lispro-aabc, 1 U Dial, (LYUMJEV KWIKPEN) 100 UNIT/ML Subcutaneous Solution Pen-injector; Inject using sliding scale as directed 3x/day up to total daily dose of 30 units    Essential hypertension    Mixed  hyperlipidemia         Return in about 3 months (around 7/22/2024) for 45 minute appointment, Personal CGM.    The risks and benefits of my recommendations, as well as other treatment options were discussed with the patient today. Questions were answered to the best of my knowledge.   40 min spent with patient and >50% time spent counseling and coordinating care related to their office visit.      Nani JOHNSON, BC-ADM, [facility]

## 2024-05-29 ENCOUNTER — OFFICE VISIT (OUTPATIENT)
Dept: FAMILY MEDICINE CLINIC | Facility: CLINIC | Age: 59
End: 2024-05-29

## 2024-05-29 VITALS
BODY MASS INDEX: 26.92 KG/M2 | HEIGHT: 70 IN | HEART RATE: 82 BPM | WEIGHT: 188 LBS | SYSTOLIC BLOOD PRESSURE: 126 MMHG | RESPIRATION RATE: 18 BRPM | TEMPERATURE: 98 F | DIASTOLIC BLOOD PRESSURE: 80 MMHG | OXYGEN SATURATION: 99 %

## 2024-05-29 DIAGNOSIS — Z98.890 S/P AORTIC ANEURYSM REPAIR: ICD-10-CM

## 2024-05-29 DIAGNOSIS — Z79.4 TYPE 2 DIABETES MELLITUS WITHOUT COMPLICATION, WITH LONG-TERM CURRENT USE OF INSULIN (HCC): Primary | ICD-10-CM

## 2024-05-29 DIAGNOSIS — Z12.5 SCREENING FOR MALIGNANT NEOPLASM OF PROSTATE: ICD-10-CM

## 2024-05-29 DIAGNOSIS — N20.0 KIDNEY STONE: ICD-10-CM

## 2024-05-29 DIAGNOSIS — Z86.79 S/P AORTIC ANEURYSM REPAIR: ICD-10-CM

## 2024-05-29 DIAGNOSIS — M75.01 ADHESIVE CAPSULITIS OF RIGHT SHOULDER: ICD-10-CM

## 2024-05-29 DIAGNOSIS — D12.6 TUBULAR ADENOMA OF COLON: ICD-10-CM

## 2024-05-29 DIAGNOSIS — E78.2 MIXED HYPERLIPIDEMIA: ICD-10-CM

## 2024-05-29 DIAGNOSIS — E11.9 TYPE 2 DIABETES MELLITUS WITHOUT COMPLICATION, WITH LONG-TERM CURRENT USE OF INSULIN (HCC): Primary | ICD-10-CM

## 2024-05-29 PROBLEM — I10 ESSENTIAL HYPERTENSION: Status: RESOLVED | Noted: 2021-08-03 | Resolved: 2024-05-29

## 2024-05-29 NOTE — PROGRESS NOTES
[facility] Family Medicine Office Note  Chief Complaint:   Chief Complaint   Patient presents with    [facility]       HPI:   This is a 59 year old male coming in to establish care.     Has h/o T2DM, diagnosed ~20years ago. He follows with DM clinic. He is on toujeo 50u, ozempic 2mg, lyumjev SSI. Overall has been controlled. Last A1c value was 7.7% done 4/22/2024.    H/o aortic aneursym s/p aortic root replacement 12/2023 with 30mm Hemashield graft (extending into noncoronary sinus with sparing of the left and right coronary sinus). He follows with cardiology, Dr Ruth.     H/o tubular adenoma - due for colonoscopy.     H/o kidney stones - no recent episodes.     Chronic R shoulder pain - had rotator cuff impingement and underwent arthroscopy through [facility] in 2022. Had improvement after surgery and PT but then started developing stiffness, pain.Seen by ortho again, this time with Dr. Loera. He was dx w/ frozen shoulder & given R shoulder cortisone injection. Overall it does feel better but has times when his ROM is restricted and pain at lateral shoulder. He does note that when he does his home exercises that were prescribed by PT that his symptoms do improve. Sx minimal today. No radiation. No numbness/weakness.     Past Medical History:    Ascending aortic aneurysm (HCC)    Back problem    Calculus of kidney    Cardiovascular function study, abnormal    Coronary atherosclerosis    Diabetes (HCC)    Essential hypertension    Gall stone    High blood pressure    High cholesterol    Kidney stone    Type 2 diabetes mellitus with microalbuminuria, with long-term current use of insulin (HCC)     Past Surgical History:   Procedure Laterality Date    Angiogram  FALL 2013    Aortic aneurysm 5-5.4cm diam      Appendectomy  01/2006    Cabg  2013    Cardiac catheterization  12/02/2013    Procedure: HEART ASCENDING AORTA REPLACEMENT;  Surgeon: Andrew Oliva MD;  Location: [facility]    Cholecystectomy   04/30/2021    Colonoscopy      Colonoscopy N/A 01/08/2018    Procedure: COLONOSCOPY;  Surgeon: Kirit Cody MD;  Location:  ENDOSCOPY    Colonoscopy N/A 06/18/2021    Procedure: COLONOSCOPY with ORISE injection, hot polypectomy, and clip placement;  Surgeon: Ankush Waller DO;  Location:  ENDOSCOPY    Lithotripsy Right 09/24/2020    right ESWL with right RPG    Other surgical history  04/2013    staph infection    Tonsillectomy  1967     Social History:  Social History     Socioeconomic History    Marital status: Single   Tobacco Use    Smoking status: Never    Smokeless tobacco: Never   Vaping Use    Vaping status: Never Used   Substance and Sexual Activity    Alcohol use: Yes     Alcohol/week: 4.0 standard drinks of alcohol     Types: 4 Cans of beer per week     Comment: SOCIAL    Drug use: No   Other Topics Concern    Caffeine Concern No    Stress Concern No    Weight Concern No    Special Diet No    Exercise Yes     Comment: Yes I exercise    Seat Belt Yes     Comment: Yes I wear a seat belt     Family History:  Family History   Problem Relation Age of Onset    Heart Disorder Mother     Diabetes Father     High Cholesterol Father     Obesity Father     Other (Other) Father         L BKA    Dementia Maternal Grandmother     Cancer Paternal Grandfather         Lung Cancer - Heavy Smoker     Allergies:  No Known Allergies  Current Meds:  Current Outpatient Medications   Medication Sig Dispense Refill    Insulin Lispro-aabc, 1 U Dial, (LYUMJEV KWIKPEN) 100 UNIT/ML Subcutaneous Solution Pen-injector Inject using sliding scale as directed 3x/day up to total daily dose of 30 units 15 mL 0    Insulin Glargine, 2 Unit Dial, (TOUJEO MAX SOLOSTAR) 300 UNIT/ML Subcutaneous Solution Pen-injector Inject daily as directed up to TDD = 50 units      semaglutide (OZEMPIC, 2 MG/DOSE,) 8 MG/3ML Subcutaneous Solution Pen-injector INJECT 2 MG INTO THE SKIN ONCE A WEEK. 9 mL 1    ROSUVASTATIN 20 MG Oral Tab TAKE 1 TABLET BY  MOUTH EVERY DAY AT NIGHT 90 tablet 1    Continuous Blood Gluc Sensor (DEXCOM G7 SENSOR) Does not apply Misc 1 EACH EVERY 10 DAYS. 9 each 3    Insulin Pen Needle (NOVOFINE PLUS) 32G X 4 MM Does not apply Misc Inject 1 each into the skin daily. 100 each 1    Magnesium 100 MG Oral Tab Take by mouth daily.        Omega-3 Fatty Acids (FISH OIL) 435 MG Oral Cap Take by mouth daily.        B Complex Vitamins (VITAMIN B COMPLEX) Oral Tab Take by mouth daily.        aspirin 81 MG Oral Chew Tab Chew 1 tablet (81 mg total) by mouth daily. Take 4 am of procedure        Counseling given: Not Answered       REVIEW OF SYSTEMS:   Review of Systems   Constitutional: Negative.    HENT: Negative.     Eyes: Negative.    Respiratory: Negative.     Cardiovascular: Negative.    Gastrointestinal: Negative.    Endocrine: Negative.    Genitourinary: Negative.    Musculoskeletal:  Positive for arthralgias.   Skin: Negative.    Neurological: Negative.    Psychiatric/Behavioral: Negative.          EXAM:   /80   Pulse 82   Temp 98 °F (36.7 °C)   Resp 18   Ht 5' 10\" (1.778 m)   Wt 188 lb (85.3 kg)   SpO2 99%   BMI 26.98 kg/m²  Estimated body mass index is 26.98 kg/m² as calculated from the following:    Height as of this encounter: 5' 10\" (1.778 m).    Weight as of this encounter: 188 lb (85.3 kg).   Vital signs reviewed.Appears stated age, well groomed.  Physical Exam  Vitals and nursing note reviewed.   Constitutional:       Appearance: Normal appearance. He is well-developed.   HENT:      Head: Normocephalic and atraumatic.      Right Ear: Tympanic membrane, ear canal and external ear normal.      Left Ear: Tympanic membrane, ear canal and external ear normal.      Nose: Nose normal. No congestion or rhinorrhea.      Mouth/Throat:      Mouth: Mucous membranes are moist.      Pharynx: Oropharynx is clear. No oropharyngeal exudate or posterior oropharyngeal erythema.   Eyes:      Extraocular Movements: Extraocular movements intact.       Conjunctiva/sclera: Conjunctivae normal.      Pupils: Pupils are equal, round, and reactive to light.   Neck:      Thyroid: No thyromegaly.   Cardiovascular:      Rate and Rhythm: Normal rate and regular rhythm.      Pulses: Normal pulses.      Heart sounds: Normal heart sounds. No murmur heard.  Pulmonary:      Effort: Pulmonary effort is normal. No respiratory distress.      Breath sounds: Normal breath sounds. No stridor. No wheezing, rhonchi or rales.   Chest:      Chest wall: No tenderness.   Abdominal:      General: Bowel sounds are normal. There is no distension.      Palpations: Abdomen is soft. There is no mass.      Tenderness: There is no abdominal tenderness. There is no guarding or rebound.      Hernia: No hernia is present.   Musculoskeletal:      Right shoulder: Tenderness (lateral shoulder) present. Decreased range of motion.      Left shoulder: Normal.      Cervical back: Normal range of motion.   Lymphadenopathy:      Cervical: No cervical adenopathy.   Skin:     General: Skin is warm and dry.      Findings: No rash.   Neurological:      Mental Status: He is alert and oriented to person, place, and time.   Psychiatric:         Mood and Affect: Mood normal.         Behavior: Behavior normal.         Thought Content: Thought content normal.         Judgment: Judgment normal.          ASSESSMENT AND PLAN:   1. Type 2 diabetes mellitus without complication, with long-term current use of insulin (HCC)  Overall stable, could benefit from better control. Working with DM clinic, [facility]. Last A1c value was 7.7% done 4/22/2024.  DM eye/foot exam due. Advised to f/u with ophthalmology. Foot exam at future appt.   Continue present mgmt.   F/u 3mo    2. Tubular adenoma of colon  Due for colonoscopy  - Surgery Referral - In Network    3. S/P aortic aneurysm repair  Stable, CTM    4. Mixed hyperlipidemia  Stable, continue low fat diet, regular exercise, statin and fish oil.     5. Kidney stone  Stable,  asymptomatic. Stay well hydrated     6. Adhesive capsulitis of right shoulder  Can restart PT if needed. Counseled on supportive care mgmt.   - Physical Therapy Referral - [facility]    7. Screening for malignant neoplasm of prostate  - PSA, Total (Screening) [E]; Future  - PSA - Total [5363][Q]    Due for labs, advised to complete.     Meds & Refills for this Visit:  Requested Prescriptions      No prescriptions requested or ordered in this encounter       Health Maintenance:  Health Maintenance Due   Topic Date Due    Annual Physical  11/12/2022    DTaP,Tdap,and Td Vaccines (2 - Td or Tdap) 04/25/2023    Diabetes Care Dilated Eye Exam  09/19/2023    PSA  12/08/2023    Diabetes Care: GFR  01/12/2024    Diabetes Care: Microalb/Creat Ratio  01/13/2024    Diabetes Care Foot Exam  01/17/2024       Patient/Caregiver Education: Patient/Caregiver Education: There are no barriers to learning. Medical education done.   Outcome: Patient verbalizes understanding. Patient is notified to call with any questions, complications, allergies, or worsening or changing symptoms.  Patient is to call with any side effects or complications from the treatments as a result of today.     Problem List:  Patient Active Problem List   Diagnosis    Internal derangement of left knee    Thoracic aortic ectasia (HCC)    Ascending aortic aneurysm (HCC)    Mixed hyperlipidemia    Tubular adenoma of colon    Chondromalacia patellae, left    Trochanteric bursitis, left hip    Nevus    Kidney stone    Type 2 diabetes mellitus without complication, with long-term current use of insulin (HCC)

## 2024-06-01 LAB — PSA, TOTAL: 1.14 NG/ML

## 2024-07-19 LAB
ALBUMIN/GLOBULIN RATIO: 1.9 (CALC) (ref 1–2.5)
ALBUMIN: 4.4 G/DL (ref 3.6–5.1)
ALKALINE PHOSPHATASE: 74 U/L (ref 35–144)
ALT: 21 U/L (ref 9–46)
AMB EXT MALB URINE CALC: 14 MG/24HR
AMB EXT MALB/CRE CALC: 14 UG/MG
AST: 19 U/L (ref 10–35)
BILIRUBIN, TOTAL: 1.1 MG/DL (ref 0.2–1.2)
BUN: 15 MG/DL (ref 7–25)
CALCIUM: 9.4 MG/DL (ref 8.6–10.3)
CARBON DIOXIDE: 26 MMOL/L (ref 20–32)
CHLORIDE: 105 MMOL/L (ref 98–110)
CHOL/HDLC RATIO: 4.8 (CALC)
CHOLESTEROL, TOTAL: 200 MG/DL
CREATININE, RANDOM URINE: 227 MG/DL (ref 20–320)
CREATININE: 0.97 MG/DL (ref 0.7–1.3)
EGFR: 90 ML/MIN/1.73M2
GLOBULIN: 2.3 G/DL (CALC) (ref 1.9–3.7)
GLUCOSE: 113 MG/DL (ref 65–99)
HDL CHOLESTEROL: 42 MG/DL
HEMOGLOBIN A1C: 7.7 % OF TOTAL HGB
LDL-CHOLESTEROL: 137 MG/DL (CALC)
MICROALBUMIN/CREATININE RATIO, RANDOM URINE: 14 MG/G CREAT
MICROALBUMIN: 3.1 MG/DL
NON-HDL CHOLESTEROL: 158 MG/DL (CALC)
POTASSIUM: 4.2 MMOL/L (ref 3.5–5.3)
PROTEIN, TOTAL: 6.7 G/DL (ref 6.1–8.1)
SODIUM: 140 MMOL/L (ref 135–146)
TRIGLYCERIDES: 104 MG/DL

## 2024-07-22 ENCOUNTER — OFFICE VISIT (OUTPATIENT)
Dept: ENDOCRINOLOGY CLINIC | Facility: CLINIC | Age: 59
End: 2024-07-22
Payer: COMMERCIAL

## 2024-07-22 VITALS
DIASTOLIC BLOOD PRESSURE: 82 MMHG | WEIGHT: 187 LBS | HEART RATE: 78 BPM | SYSTOLIC BLOOD PRESSURE: 122 MMHG | OXYGEN SATURATION: 97 % | RESPIRATION RATE: 20 BRPM | BODY MASS INDEX: 27 KG/M2

## 2024-07-22 DIAGNOSIS — I10 ESSENTIAL HYPERTENSION: ICD-10-CM

## 2024-07-22 DIAGNOSIS — Z79.4 TYPE 2 DIABETES MELLITUS WITHOUT COMPLICATION, WITH LONG-TERM CURRENT USE OF INSULIN (HCC): Primary | ICD-10-CM

## 2024-07-22 DIAGNOSIS — E78.2 MIXED HYPERLIPIDEMIA: ICD-10-CM

## 2024-07-22 DIAGNOSIS — E11.9 TYPE 2 DIABETES MELLITUS WITHOUT COMPLICATION, WITH LONG-TERM CURRENT USE OF INSULIN (HCC): Primary | ICD-10-CM

## 2024-07-22 PROCEDURE — 95251 CONT GLUC MNTR ANALYSIS I&R: CPT | Performed by: NURSE PRACTITIONER

## 2024-07-22 PROCEDURE — 99214 OFFICE O/P EST MOD 30 MIN: CPT | Performed by: NURSE PRACTITIONER

## 2024-07-22 NOTE — PATIENT INSTRUCTIONS
We are here to support you with Diabetes but please remember that you still need your primary care doctor for your routine health maintenance.   Your current A1C: 7.7%  This test provides us with your average blood sugar for the past 3 months.   The main goal of diabetes treatment is to keep your sugar from going too high. We measure your overall blood sugar trends with a Hemoglobin A1C test. (also called an A1C)  For most people the target is less than 7.0% but sometimes we make exceptions based on age, health history and other factors.   Keeping an A1C less than 7% helps prevents diabetes related health problems.   If your A1C goes too high, then we need to talk about changing your current diabetes treatment.    MEDICATIONS:   It is important to take all of your medications as prescribed.   Please call me if you cannot get the prescriptions filled or are having issues with refills.   Also, please call me if you have any issues with medication questions, side effects, dosing questions or problems with your blood sugar trends BEFORE CHANGING OR STOPPING ANY MEDICATIONS.   Continue Ozempic 2mg injection weekly  Continue Toujeo 54 units injection daily  Continue Lyumjev sliding scale: 200-250 mg/dl = 2 units, 251-300 mg/dl = 4 units, 301-350 mg/dl = 6 units, > 350 mg/dl = 8 units 3x/day with meals.  Start Jardiance 10mg daily - drink 4-6 eight ounce glasses of water daily to avoid dehydration    Blood sugar testing:   Always bring your glucose meter or blood sugar logbook to every appointment here at the diabetes center.  This allows me to safely make adjustments to your diabetes plan.   In order for me to determine any patterns in your blood sugars, you will need to continue to use Dexcom CGM test your blood sugar 3 times daily   It would be best to change up the times of day that you are testing your sugar.   Always test before breakfast (fasting) and then alternate testing blood sugar 2 hours after your meals.      Blood sugar targets:  Before breakfast:   (preferably < 110)  2 hours After meals: less than 180 (preferably less than 150)   Call for persistent blood sugars < 75 or > 200.   Blood sugars greater than 200 are not acceptable to reach your goal of improving diabetes      Health Maintenance:   1. LABS: It is important to monitor your kidney function (blood and urine protein levels) , liver function tests and cholesterol levels when you have diabetes.     2. FOOT EXAMS:  daily foot inspections for foot wounds or skin changes are important for foot care. Any unusual changes should be reported immediately.    3. EYE EXAMS: Checking your eyes for diabetes changes is important and you should have a dilated eye exam done by an eye doctor EVERY year since these changes occur in the BACK of the eye and not visible by you.  Please let me know if you need provider list for eye doctor.     Lifestyle Therapy:    1. NUTRITION: Maintain optimal weight, calorie restriction if overweight, plant-based diet    2. PHYSICAL ACTIVITY: 150 minutes per week (30 minutes a day 5 days a week) of moderate exertion such as walking, stair climbing.  Include strength training 2-3 times per week.  Increase as tolerated.    3. SLEEP: Try and get 7-8 hours of sleep per night    4. BEHAVIOR:  Tobacco cessation and alcohol in moderation      Reminders:  Due for diabetes eye exam    Nani JOHNSON, BC-[facility], [facility]  [address], [place] [address]  [address], [address] [place] [address]  [address] [place] [address]  Diabetes Services at [facility]  T [phone]  F [phone]

## 2024-07-22 NOTE — PROGRESS NOTES
HPI:   Chester Porter is a 59 year old male who presents for follow up for the management of his diabetes.     Chief Complaint   Patient presents with    Diabetes     F/U - streaming dexcom     Patient is using personal continuous glucose monitoring or would be testing BGs at least 4 times per day.  Patient is on at least 3 insulin injections per day.   Patient is making self-adjustments in insulin according to blood sugars or carbs.    Diabetes: needs improvement  Type: 2   Duration:dx 2013 - was initially dx as type 2 then was told he had type 1 after staph infection - DALY and ISLET cell antibodies negative  Current Meds: Ozempic 2mg injection weekly, Toujeo 54 units injection daily,  Lyumjev sliding scale: 200-250 mg/dl = 2 units, 251-300 mg/dl = 4 units, 301-350 mg/dl = 6 units, > 350 mg/dl = 8 units tid w/meals   SE: denies  Long term insulin use: yes  Failed Medications: Lantus, Novolog, Levemir, Januvia, Metformin, Toujeo   Complications: Hx Proteinuria, CAD    Personal  Dexcom CGM  Analysis of data: 7/9/2024 - 7/22/2024  % Time CGM is Active: 51.6%  Sensor download: full report  in media  Average glucose : 159 mg/dl   GMI: 7.1%    Standard deviation: 45 mg/dl   CV (coefficient of variation) :28.1%     27% time above 180mg /dl   3% time above 250 mg/dl  70% time in target range:  mg/dl   0% time below target range: 70mg/dl     Evaluation   1. Baseline glucose stable and in target range  2. Some afternoon and evening postprandial elevation, usually stays withing target range with return to baseline  3. Low likelihood of hypoglycemia  4. Normal glucose variability      Overall glucose control:   HGBA1C:    Lab Results   Component Value Date    A1C 7.7 (H) 07/18/2024    A1C 7.7 (A) 04/22/2024    A1C 6.2 (A) 08/29/2023     (H) 07/28/2021       Hypertension: stable, at goal  Blood Pressure: 122/82   Medication prescribed: none    SE: lightheadedness when taking lisinopril     Hyperlipidemia: needs  improvement  LDL: 137  Tri  Medication prescribed: rousvastatin - not consistently taking, Omega 3  SE: denies     Wt Readings from Last 3 Encounters:   24 187 lb (84.8 kg)   24 188 lb (85.3 kg)   24 188 lb 12.8 oz (85.6 kg)     BP Readings from Last 3 Encounters:   24 122/82   24 126/80   24 120/72          Past History:   He  has a past medical history of Ascending aortic aneurysm ([facility]), Back problem, Calculus of kidney, Cardiovascular function study, abnormal, Coronary atherosclerosis, Diabetes ([facility]) (2023), Essential hypertension (2021), Gall stone, High blood pressure, High cholesterol, Kidney stone (2020), and Type 2 diabetes mellitus with microalbuminuria, with long-term current use of insulin ([facility]).   His family history includes Cancer in his paternal grandfather; Dementia in his maternal grandmother; Diabetes in his father; Heart Disorder in his mother; High Cholesterol in his father; Obesity in his father; Other in his father.   He  reports that he has never smoked. He has never used smokeless tobacco. He reports current alcohol use of about 4.0 standard drinks of alcohol per week. He reports that he does not use drugs.     He has No Known Allergies.     Current Outpatient Medications on File Prior to Visit   Medication Sig    Insulin Lispro-aabc, 1 U Dial, (LYUMJEV KWIKPEN) 100 UNIT/ML Subcutaneous Solution Pen-injector Inject using sliding scale as directed 3x/day up to total daily dose of 30 units    Insulin Glargine, 2 Unit Dial, (TOUJEO MAX SOLOSTAR) 300 UNIT/ML Subcutaneous Solution Pen-injector Inject daily as directed up to TDD = 50 units    semaglutide (OZEMPIC, 2 MG/DOSE,) 8 MG/3ML Subcutaneous Solution Pen-injector INJECT 2 MG INTO THE SKIN ONCE A WEEK.    ROSUVASTATIN 20 MG Oral Tab TAKE 1 TABLET BY MOUTH EVERY DAY AT NIGHT    Continuous Blood Gluc Sensor (DEXCOM G7 SENSOR) Does not apply Misc 1 EACH EVERY 10 DAYS.    Magnesium 100 MG  Oral Tab Take by mouth daily.      Omega-3 Fatty Acids (FISH OIL) 435 MG Oral Cap Take by mouth daily.      B Complex Vitamins (VITAMIN B COMPLEX) Oral Tab Take by mouth daily.      aspirin 81 MG Oral Chew Tab Chew 1 tablet (81 mg total) by mouth daily. Take 4 am of procedure    [DISCONTINUED] Insulin Pen Needle (NOVOFINE PLUS) 32G X 4 MM Does not apply Misc Inject 1 each into the skin daily.     No current facility-administered medications on file prior to visit.       CMP  (most recent labs)   Lab Results   Component Value Date/Time     (H) 07/18/2024 12:12 PM    BUN 15 07/18/2024 12:12 PM    CREATSERUM 0.97 07/18/2024 12:12 PM    GFRNAA 82 12/08/2021 10:04 AM    GFRAA 95 12/08/2021 10:04 AM    EGFRCR 90 07/18/2024 12:12 PM    CA 9.4 07/18/2024 12:12 PM    ALKPHO 74 07/18/2024 12:12 PM    AST 19 07/18/2024 12:12 PM    ALT 21 07/18/2024 12:12 PM    BILT 1.1 07/18/2024 12:12 PM    TP 6.7 07/18/2024 12:12 PM    ALB 4.4 07/18/2024 12:12 PM     07/18/2024 12:12 PM    K 4.2 07/18/2024 12:12 PM     07/18/2024 12:12 PM    CO2 26 07/18/2024 12:12 PM           Lipids  (most recent labs)   Lab Results   Component Value Date/Time    CHOLEST 200 (H) 07/18/2024 12:12 PM    TRIG 104 07/18/2024 12:12 PM    HDL 42 07/18/2024 12:12 PM     (H) 07/18/2024 12:12 PM    NONHDLC 158 (H) 07/18/2024 12:12 PM          Diabetes  (most recent labs)   Lab Results   Component Value Date/Time    A1C 7.7 (H) 07/18/2024 12:12 PM          Microalb (most recent labs)   Lab Results   Component Value Date/Time    MICROALBUMIN 3.1 07/18/2024 12:12 PM    MICROALBCREA 26.6 07/18/2022 03:09 PM        Lab results reviewed with patient.    REVIEW OF SYSTEMS:   GENERAL HEALTH: feels well otherwise  SKIN: denies any unusual skin lesions or rashes  RESPIRATORY: denies shortness of breath with exertion  CARDIOVASCULAR: denies chest pain on exertion  GI: denies nausea, abdominal pain or heartburn  NEURO: denies headaches and denies  numbness and tingling to extremities  ENDO: denies polydipsia, polyuria or polyphagia, denies unexplained weight changes    EXAM:   /82   Pulse 78   Resp 20   Wt 187 lb (84.8 kg)   SpO2 97%   BMI 26.83 kg/m²  Estimated body mass index is 26.83 kg/m² as calculated from the following:    Height as of 5/29/24: 5' 10\" (1.778 m).    Weight as of this encounter: 187 lb (84.8 kg).   Physical Exam  Vitals reviewed.   Cardiovascular:      Pulses:           Dorsalis pedis pulses are 2+ on the right side and 2+ on the left side.   Pulmonary:      Effort: Pulmonary effort is normal.   Feet:      Right foot:      Protective Sensation: 5 sites tested.  5 sites sensed.      Skin integrity: Callus present.      Toenail Condition: Right toenails are normal.      Left foot:      Protective Sensation: 5 sites tested.  5 sites sensed.      Skin integrity: Callus present.      Toenail Condition: Left toenails are normal.      Comments: Diabetes foot exam performed: Vibration to dorsum to the first toe perceived bilaterally.  Foot care practices reviewed with patient.   Neurological:      Mental Status: He is alert and oriented to person, place, and time.   Psychiatric:         Mood and Affect: Mood normal.         Behavior: Behavior normal.         ASSESSMENT AND PLAN:   As for his Diabetes, it is no significant medication side effects noted, needs further observation, needs improvement, needs to follow diet more regularly.     Recommendations are: continue present meds, lose weight by increased dietary compliance and exercise and check feet daily.    Discussed the risk of and benefits of SGLT2 inhibitor class for treatment of type 2 Diabetes. This class of meds results in the renal excretion of glucose and can lower blood sugars.   eGfr: 90  Jardiance 10mg po daily rx was sent to pharmacy  Patient was instructed to drink at least 6 eight ounce glasses of water daily to avoid dehydration.    Patient to continue Ozempic 2mg  injection weekly, Lyumjev sliding scale: 200-250 mg/dl = 2 units, 251-300 mg/dl = 4 units, 301-350 mg/dl = 6 units, > 350 mg/dl = 8 units tid w/meals and Toujeo 54 units injection daily.  Start Jardiance 10mg po daily.    Per ADA guidelines, in patients with type 2 diabetes and established ASCVD, incorporating agent proven to reduce major adverse CV events and CV mortality   GLP-1 Ozempic rx chosen since it not only lowers A1C, but studies have shown it can also reduce the risk of major CV events such as heart attack, stroke, or death in adults with type 2 diabetes who are currently treating their CV disease.  Per FDA indications, Jardiance is shown to reduce the risk of cardiovascular death in adults with type 2 diabetes and established cardiovascular disease, and to reduce the risk of death and hospitalization in patients with heart failure and low ejection fraction when added to standard of care therapies for diabetes and patients with known atherosclerotic cardiovascular disease.  Patient to continue to use personal Dexcom G7 CGM for glucose monitoring.  Discussed self monitoring blood glucose and the importance of monitoring.  Patient agreed to monitoring qid - before meals and 2 hours postprandial of one meal per day if not using CGM.  Hypoglycemia S&S, Rx, and when to call APRN/CDE reviewed using Rule of 15's. Stressed need to call if 2 readings below 80 mg/dl in 1 week for medication adjustment.   Reinforced healthy eating in healthy portions and increasing daily physical activity.       As for his hypertension, Blood Pressure is well controlled, stable.   PLAN: reviewed diet, exercise and weight control         As for his cholesterol, Lipids are no significant medication side effects noted, needs further observation, needs improvement, needs to follow diet more regularly.   PLAN: will continue present medications, reviewed diet, exercise and weight control, continue statin therapy.  Reinforced importance of  consistency with taking medication.        DM Health Maintenance  Last dilated eye exam: No data recorded   Exam shows retinopathy? No data recorded    Last diabetic foot exam: Last Foot Exam: 24      Date of last PHQ-2 depression screen: PHQ-2 - Date of last depression screenin2024      Patient  reports that he has never smoked. He has never used smokeless tobacco.  When is flu vaccine due? No recommendations at this time  When is pneumonia vaccine due? No recommendations at this time    The patient indicates understanding of these issues and agrees to the plan.  Refills addressed at time of office visit.    Diagnoses and all orders for this visit:    Type 2 diabetes mellitus without complication, with long-term current use of insulin (HCC)  -     empagliflozin (JARDIANCE) 10 MG Oral Tab; Take 1 tablet (10 mg total) by mouth daily.  -     Insulin Pen Needle (NOVOFINE PLUS) 32G X 4 MM Does not apply Misc; Inject 1 each into the skin 4 (four) times daily.  -     HGB A1C [496] [Q]    Essential hypertension    Mixed hyperlipidemia           Return in about 3 months (around 10/22/2024) for 45 minute appointment, Personal CGM.    The risks and benefits of my recommendations, as well as other treatment options were discussed with the patient today. Questions were answered to the best of my knowledge.   40 min spent with patient and >50% time spent counseling and coordinating care related to their office visit.      Nani JOHNSON, BC-ADM, [facility]

## 2024-08-26 DIAGNOSIS — E11.9 TYPE 2 DIABETES MELLITUS WITHOUT COMPLICATION, WITH LONG-TERM CURRENT USE OF INSULIN (HCC): ICD-10-CM

## 2024-08-26 DIAGNOSIS — Z79.4 TYPE 2 DIABETES MELLITUS WITHOUT COMPLICATION, WITH LONG-TERM CURRENT USE OF INSULIN (HCC): ICD-10-CM

## 2024-08-26 RX ORDER — SEMAGLUTIDE 2.68 MG/ML
2 INJECTION, SOLUTION SUBCUTANEOUS WEEKLY
Qty: 9 ML | Refills: 1 | Status: SHIPPED | OUTPATIENT
Start: 2024-08-26

## 2024-08-26 NOTE — TELEPHONE ENCOUNTER
Requested Prescriptions     Pending Prescriptions Disp Refills    OZEMPIC, 2 MG/DOSE, 8 MG/3ML Subcutaneous Solution Pen-injector [Pharmacy Med Name: OZEMPIC 8 MG/3 ML (2 MG/DOSE)]  5     Sig: INJECT 2 MG INTO THE SKIN ONCE A WEEK.     HGBA1C:    Lab Results   Component Value Date    A1C 7.7 (H) 07/18/2024    A1C 7.7 (A) 04/22/2024    A1C 6.2 (A) 08/29/2023     (H) 07/28/2021     Your Appointments      Wednesday August 28, 2024 5:00 PM  Follow Up Visit with Janes Chacko MD  [address] ([facility]) [address]  [facility] [id number] [phone]        Friday October 25, 2024 1:15 PM  Video Visit with ELIZABETH Stauffer  [facility], [place] ([facility] [place] [facility]) [address]  [facility] [id number] [phone]   Please verify your telehealth insurance benefits prior to your appointment.    You must be in the [facility] during the virtual visit.     Please use the [employer] Mobile Nicole and launch the video visit 10 minutes prior to your scheduled appointment time to ensure your camera and microphone are working properly. Once the video visit has started you will be placed in a waiting room until the provider begins the visit.     You will receive an email confirmation with instructions.  If you have questions, call your doctor's office directly.    If you are having issues or need to use a desktop/laptop, please follow the below steps:        1.       Close out all other open apps (could be competing for audio resources)  2.       Disable Bluetooth  3.       Reboot mobile device before joining the video  4.       Come off Wi-Fi and switch over to Data    Please see our Video Visit Tip Sheet if you need additional assistance.     If you believe this is an emergency, please dial 911 immediately.                  Last OFFICE VISIT:  7--DH     Last Refill:  3--9  ml with 1 refills

## 2024-08-28 ENCOUNTER — OFFICE VISIT (OUTPATIENT)
Dept: FAMILY MEDICINE CLINIC | Facility: CLINIC | Age: 59
End: 2024-08-28
Payer: COMMERCIAL

## 2024-08-28 VITALS
BODY MASS INDEX: 26.34 KG/M2 | HEIGHT: 70 IN | SYSTOLIC BLOOD PRESSURE: 120 MMHG | DIASTOLIC BLOOD PRESSURE: 70 MMHG | WEIGHT: 184 LBS | OXYGEN SATURATION: 98 % | HEART RATE: 80 BPM

## 2024-08-28 DIAGNOSIS — Z79.4 TYPE 2 DIABETES MELLITUS WITHOUT COMPLICATION, WITH LONG-TERM CURRENT USE OF INSULIN (HCC): Primary | ICD-10-CM

## 2024-08-28 DIAGNOSIS — E11.9 TYPE 2 DIABETES MELLITUS WITHOUT COMPLICATION, WITH LONG-TERM CURRENT USE OF INSULIN (HCC): Primary | ICD-10-CM

## 2024-08-28 DIAGNOSIS — I71.21 ANEURYSM OF ASCENDING AORTA WITHOUT RUPTURE (HCC): ICD-10-CM

## 2024-08-28 DIAGNOSIS — I10 ESSENTIAL HYPERTENSION: ICD-10-CM

## 2024-08-28 PROCEDURE — 99213 OFFICE O/P EST LOW 20 MIN: CPT | Performed by: FAMILY MEDICINE

## 2024-08-28 NOTE — PROGRESS NOTES
[facility] Family Medicine Office Note  Chief Complaint:   Chief Complaint   Patient presents with    Follow - Up     3 month follow up        HPI:   This is a 59 year old male coming in for follow up of chronic conditions:    T2DM - Last A1c value was 7.7% done 7/18/2024. Following with DM clinic. He is compliant with medications. Has completed diabetic eye exam, he did recently send in records request.     HTN, h/o AAA- stable w/o medications at this time. Following with cardiology. Due for CTA of aorta.     Past Medical History:    Ascending aortic aneurysm (HCC)    Back problem    Calculus of kidney    Cardiovascular function study, abnormal    Coronary atherosclerosis    Diabetes (HCC)    Essential hypertension    Gall stone    High blood pressure    High cholesterol    Kidney stone    Type 2 diabetes mellitus with microalbuminuria, with long-term current use of insulin (HCC)     Past Surgical History:   Procedure Laterality Date    Angiogram  FALL 2013    Aortic aneurysm 5-5.4cm diam      Appendectomy  01/2006    Cabg  2013    Cardiac catheterization  12/02/2013    Procedure: HEART ASCENDING AORTA REPLACEMENT;  Surgeon: Andrew Oliva MD;  Location:  [facility]    Cholecystectomy  04/30/2021    Colonoscopy      Colonoscopy N/A 01/08/2018    Procedure: COLONOSCOPY;  Surgeon: Kirit Cody MD;  Location:  ENDOSCOPY    Colonoscopy N/A 06/18/2021    Procedure: COLONOSCOPY with ORISE injection, hot polypectomy, and clip placement;  Surgeon: Ankush Waller DO;  Location:  ENDOSCOPY    Lithotripsy Right 09/24/2020    right ESWL with right RPG    Other surgical history  04/2013    staph infection    Tonsillectomy  1967     Social History:  Social History     Socioeconomic History    Marital status: Single   Tobacco Use    Smoking status: Never    Smokeless tobacco: Never   Vaping Use    Vaping status: Never Used   Substance and Sexual Activity    Alcohol use: Yes     Alcohol/week: 4.0 standard drinks of  alcohol     Types: 4 Cans of beer per week     Comment: SOCIAL    Drug use: No   Other Topics Concern    Caffeine Concern No    Stress Concern No    Weight Concern No    Special Diet No    Exercise Yes     Comment: Yes I exercise    Seat Belt Yes     Comment: Yes I wear a seat belt     Family History:  Family History   Problem Relation Age of Onset    Heart Disorder Mother     Diabetes Father     High Cholesterol Father     Obesity Father     Other (Other) Father         L BKA    Dementia Maternal Grandmother     Cancer Paternal Grandfather         Lung Cancer - Heavy Smoker     Allergies:  No Known Allergies  Current Meds:  Current Outpatient Medications   Medication Sig Dispense Refill    semaglutide (OZEMPIC, 2 MG/DOSE,) 8 MG/3ML Subcutaneous Solution Pen-injector INJECT 2 MG INTO THE SKIN ONCE A WEEK. 9 mL 1    empagliflozin (JARDIANCE) 10 MG Oral Tab Take 1 tablet (10 mg total) by mouth daily. 30 tablet 1    Insulin Pen Needle (NOVOFINE PLUS) 32G X 4 MM Does not apply Misc Inject 1 each into the skin 4 (four) times daily. 400 each 1    Insulin Lispro-aabc, 1 U Dial, (LYUMJEV KWIKPEN) 100 UNIT/ML Subcutaneous Solution Pen-injector Inject using sliding scale as directed 3x/day up to total daily dose of 30 units 15 mL 0    Insulin Glargine, 2 Unit Dial, (TOUJEO MAX SOLOSTAR) 300 UNIT/ML Subcutaneous Solution Pen-injector Inject daily as directed up to TDD = 50 units      ROSUVASTATIN 20 MG Oral Tab TAKE 1 TABLET BY MOUTH EVERY DAY AT NIGHT 90 tablet 1    Continuous Blood Gluc Sensor (DEXCOM G7 SENSOR) Does not apply Misc 1 EACH EVERY 10 DAYS. 9 each 3    Magnesium 100 MG Oral Tab Take by mouth daily.        Omega-3 Fatty Acids (FISH OIL) 435 MG Oral Cap Take by mouth daily.        B Complex Vitamins (VITAMIN B COMPLEX) Oral Tab Take by mouth daily.        aspirin 81 MG Oral Chew Tab Chew 1 tablet (81 mg total) by mouth daily. Take 4 am of procedure        Counseling given: Not Answered       REVIEW OF SYSTEMS:    Review of Systems   Constitutional: Negative.    HENT: Negative.     Eyes: Negative.    Respiratory: Negative.     Cardiovascular: Negative.    Gastrointestinal: Negative.    Endocrine: Negative.    Genitourinary: Negative.    Musculoskeletal: Negative.    Skin: Negative.    Neurological: Negative.    Psychiatric/Behavioral: Negative.          EXAM:   /70   Pulse 80   Ht 5' 10\" (1.778 m)   Wt 184 lb (83.5 kg)   SpO2 98%   BMI 26.40 kg/m²  Estimated body mass index is 26.4 kg/m² as calculated from the following:    Height as of this encounter: 5' 10\" (1.778 m).    Weight as of this encounter: 184 lb (83.5 kg).   Vital signs reviewed.Appears stated age, well groomed.  Physical Exam  Vitals and nursing note reviewed.   Constitutional:       Appearance: Normal appearance.   HENT:      Head: Normocephalic and atraumatic.   Eyes:      Pupils: Pupils are equal, round, and reactive to light.   Cardiovascular:      Rate and Rhythm: Normal rate and regular rhythm.      Pulses: Normal pulses.      Heart sounds: Normal heart sounds. No murmur heard.  Pulmonary:      Effort: Pulmonary effort is normal. No respiratory distress.      Breath sounds: Normal breath sounds. No wheezing or rhonchi.   Abdominal:      General: Abdomen is flat. Bowel sounds are normal. There is no distension.      Palpations: Abdomen is soft. There is no mass.      Tenderness: There is no abdominal tenderness.      Hernia: No hernia is present.   Musculoskeletal:      Right lower leg: No edema.      Left lower leg: No edema.   Skin:     Findings: No rash.   Neurological:      General: No focal deficit present.      Mental Status: He is alert and oriented to person, place, and time.          ASSESSMENT AND PLAN:   1. Type 2 diabetes mellitus without complication, with long-term current use of insulin (HCC)  Stable but could benefit from tighter control. Last A1c value was 7.7% done 7/18/2024. Continue present mgmt for now. Continue to work on  diet/exercise recommendations.     Goal: A1c < 7   Medications: ozempic, insulin glargine, insulin lispro, jardiance.   Diet & exercise: diabetic diet, regular exericse.   Daily accucheks are appropriate  Hypoglycemic episodes?: none   Labs: UTD  CV: continue statin, fish oil, asa  Renal: UTD  Eye: needs records   Foot: UTD  Immunizations: due for Tdap   RTC in 6mo for annual physical.      2. Aneurysm of ascending aorta without rupture (HCC)  Stable, following with cardiology. Due for gated CTA of aorta.     3. Essential hypertension  Controlled off meds. Advised to monitor closely. Monitor BP at home, goal <140/90. Follow low sodium diet, regular exercise. F/u 6mo.       Meds & Refills for this Visit:  Requested Prescriptions      No prescriptions requested or ordered in this encounter       Health Maintenance:  Health Maintenance Due   Topic Date Due    Annual Physical  11/12/2022    DTaP,Tdap,and Td Vaccines (2 - Td or Tdap) 04/25/2023    Diabetes Care Dilated Eye Exam  09/19/2023    Diabetes Care: Microalb/Creat Ratio  01/13/2024       Patient/Caregiver Education: Patient/Caregiver Education: There are no barriers to learning. Medical education done.   Outcome: Patient verbalizes understanding. Patient is notified to call with any questions, complications, allergies, or worsening or changing symptoms.  Patient is to call with any side effects or complications from the treatments as a result of today.     Problem List:  Patient Active Problem List   Diagnosis    Internal derangement of left knee    Thoracic aortic ectasia (HCC)    Ascending aortic aneurysm (HCC)    Mixed hyperlipidemia    Tubular adenoma of colon    Chondromalacia patellae, left    Trochanteric bursitis, left hip    Nevus    Kidney stone    Essential hypertension    Type 2 diabetes mellitus without complication, with long-term current use of insulin (HCC)

## 2024-09-03 ENCOUNTER — TELEPHONE (OUTPATIENT)
Dept: ENDOCRINOLOGY CLINIC | Facility: CLINIC | Age: 59
End: 2024-09-03

## 2024-09-06 NOTE — TELEPHONE ENCOUNTER
Your Appointments      Friday November 01, 2024 1:15 PM  Video Visit with ELIZABETH Stauffer  [facility] [address] ([facility] [place]) [address]  [facility] [id number] [phone]   Please verify your telehealth insurance benefits prior to your appointment.    You must be in the [facility] during the virtual visit.     Please use the [employer] Mobile Nicole and launch the video visit 10 minutes prior to your scheduled appointment time to ensure your camera and microphone are working properly. Once the video visit has started you will be placed in a waiting room until the provider begins the visit.     You will receive an email confirmation with instructions.  If you have questions, call your doctor's office directly.    If you are having issues or need to use a desktop/laptop, please follow the below steps:        1.       Close out all other open apps (could be competing for audio resources)  2.       Disable Bluetooth  3.       Reboot mobile device before joining the video  4.       Come off Wi-Fi and switch over to Data    Please see our Video Visit Tip Sheet if you need additional assistance.     If you believe this is an emergency, please dial 911 immediately.           Wednesday March 05, 2025 5:00 PM  Follow Up Visit with Janes Chacko MD  [facility] [address] [place] [address] ([facility]) [address]  [facility] [phone]

## 2024-09-20 DIAGNOSIS — E11.9 TYPE 2 DIABETES MELLITUS WITHOUT COMPLICATION, WITH LONG-TERM CURRENT USE OF INSULIN (HCC): ICD-10-CM

## 2024-09-20 DIAGNOSIS — Z79.4 TYPE 2 DIABETES MELLITUS WITHOUT COMPLICATION, WITH LONG-TERM CURRENT USE OF INSULIN (HCC): ICD-10-CM

## 2024-09-20 RX ORDER — EMPAGLIFLOZIN 10 MG/1
10 TABLET, FILM COATED ORAL DAILY
Qty: 30 TABLET | Refills: 1 | Status: SHIPPED | OUTPATIENT
Start: 2024-09-20

## 2024-09-20 NOTE — TELEPHONE ENCOUNTER
Requested Prescriptions     Pending Prescriptions Disp Refills    JARDIANCE 10 MG Oral Tab [Pharmacy Med Name: JARDIANCE 10 MG TABLET] 30 tablet 1     Sig: TAKE 1 TABLET BY MOUTH EVERY DAY     HGBA1C:    Lab Results   Component Value Date    A1C 7.7 (H) 07/18/2024    A1C 7.7 (A) 04/22/2024    A1C 6.2 (A) 08/29/2023     (H) 07/28/2021     Your Appointments      Friday November 01, 2024 1:15 PM  Video Visit with ELIZABETH Stauffer  [address] ([facility]) [address]  [facility] [address] [phone]   Please verify your telehealth insurance benefits prior to your appointment.    You must be in the [facility] during the virtual visit.     Please use the [employer] Mobile Nicole and launch the video visit 10 minutes prior to your scheduled appointment time to ensure your camera and microphone are working properly. Once the video visit has started you will be placed in a waiting room until the provider begins the visit.     You will receive an email confirmation with instructions.  If you have questions, call your doctor's office directly.    If you are having issues or need to use a desktop/laptop, please follow the below steps:        1.       Close out all other open apps (could be competing for audio resources)  2.       Disable Bluetooth  3.       Reboot mobile device before joining the video  4.       Come off Wi-Fi and switch over to Data    Please see our Video Visit Tip Sheet if you need additional assistance.     If you believe this is an emergency, please dial 911 immediately.           Wednesday March 05, 2025 5:00 PM  Follow Up Visit with Janes Chacko MD  [facility], [facility] [address] [place] [address] ([facility]) [address]  [facility] [phone]               Last OFFICE VISIT:7--    Last Refill:  7---30 tabs with 1 refills

## 2024-09-24 DIAGNOSIS — Z79.4 TYPE 2 DIABETES MELLITUS WITHOUT COMPLICATION, WITH LONG-TERM CURRENT USE OF INSULIN (HCC): ICD-10-CM

## 2024-09-24 DIAGNOSIS — E11.9 TYPE 2 DIABETES MELLITUS WITHOUT COMPLICATION, WITH LONG-TERM CURRENT USE OF INSULIN (HCC): ICD-10-CM

## 2024-09-25 NOTE — TELEPHONE ENCOUNTER
Requested Prescriptions     Pending Prescriptions Disp Refills    LYUMJEV KWIKPEN 100 UNIT/ML Subcutaneous Solution Pen-injector [Pharmacy Med Name: LYUMJEV 100 UNIT/ML KWIKPEN]  0     Sig: INJECT USING SLIDING SCALE AS DIRECTED 3X/DAY UP TO TOTAL DAILY DOSE OF 30 UNITS     Future Appointments   Date Time Provider Department Center   11/1/2024  1:15 PM Nani Aguilar APRN [id number]   3/5/2025  5:00 PM Janes Chacko MD [facility]     Your appointments       Date & Time Appointment Department (Center)    Nov 01, 2024 1:15 PM CDT Video Visit with Nani Aguilar APRN [address] ([facility] [place])    Please verify your telehealth insurance benefits prior to your appointment.    You must be in the [facility] during the virtual visit.     Please use the [employer] Mobile Nicole and launch the video visit 10 minutes prior to your scheduled appointment time to ensure your camera and microphone are working properly. Once the video visit has started you will be placed in a waiting room until the provider begins the visit.     You will receive an email confirmation with instructions.  If you have questions, call your doctor's office directly.    If you are having issues or need to use a desktop/laptop, please follow the below steps:        1.       Close out all other open apps (could be competing for audio resources)  2.       Disable Bluetooth  3.       Reboot mobile device before joining the video  4.       Come off Wi-Fi and switch over to Data    Please see our Video Visit Tip Sheet if you need additional assistance.     If you believe this is an emergency, please dial 911 immediately.          Mar 05, 2025 5:00 PM CST Follow Up Visit with Janes Chacko MD [address] ([facility])              [address]  [facility]  [address]  [facility] [id number] [phone] [facility] [address] [place]  [facility] [place]  [address]  [facility] [phone]          Last A1c value was 7.7% done 7/18/2024.  Last OV: 07/22/2024  Last refill: 04/22/2024

## 2024-09-26 RX ORDER — INSULIN LISPRO-AABC 100 [IU]/ML
INJECTION, SOLUTION SUBCUTANEOUS
Qty: 15 ML | Refills: 1 | Status: SHIPPED | OUTPATIENT
Start: 2024-09-26

## 2024-10-28 ENCOUNTER — TELEPHONE (OUTPATIENT)
Dept: ENDOCRINOLOGY CLINIC | Facility: CLINIC | Age: 59
End: 2024-10-28

## 2024-10-29 LAB — HEMOGLOBIN A1C: 6.8 % OF TOTAL HGB

## 2024-11-01 ENCOUNTER — TELEMEDICINE (OUTPATIENT)
Dept: ENDOCRINOLOGY CLINIC | Facility: CLINIC | Age: 59
End: 2024-11-01
Payer: COMMERCIAL

## 2024-11-01 ENCOUNTER — TELEPHONE (OUTPATIENT)
Dept: ENDOCRINOLOGY CLINIC | Facility: CLINIC | Age: 59
End: 2024-11-01

## 2024-11-01 DIAGNOSIS — E78.2 MIXED HYPERLIPIDEMIA: ICD-10-CM

## 2024-11-01 DIAGNOSIS — Z79.4 TYPE 2 DIABETES MELLITUS WITHOUT COMPLICATION, WITH LONG-TERM CURRENT USE OF INSULIN (HCC): Primary | ICD-10-CM

## 2024-11-01 DIAGNOSIS — E11.9 TYPE 2 DIABETES MELLITUS WITHOUT COMPLICATION, WITH LONG-TERM CURRENT USE OF INSULIN (HCC): Primary | ICD-10-CM

## 2024-11-01 PROCEDURE — 99214 OFFICE O/P EST MOD 30 MIN: CPT | Performed by: NURSE PRACTITIONER

## 2024-11-01 PROCEDURE — 95251 CONT GLUC MNTR ANALYSIS I&R: CPT | Performed by: NURSE PRACTITIONER

## 2024-11-01 NOTE — PROGRESS NOTES
HPI:   Chester Porter is a 59 year old male who presents virtually for the management of his diabetes.   This visit is conducted using Telemedicine with live, two way, interactive video and audio.  Patient verbally consents to Telemedicine visit.  Patient understands and accepts financial responsibility for any deductible, co-insurance and/or co-pays associated with this service.    Chief Complaint: Diabetes Follow Up    Patient is using personal continuous glucose monitoring or would be testing BGs at least 4 times per day.  Patient is on at least 3 insulin injections per day.   Patient is making self-adjustments in insulin according to blood sugars or carbs.    Diabetes: improved, stable, at goal  Type: 2   Duration: dx 2013 - was initially dx as type 2 then was told he had type 1 after staph infection - DALY and ISLET cell antibodies negative   Current Meds: Ozempic 2mg injection weekly, Jardiance 10mg po daily, Lyumjev sliding scale: 200-250 mg/dl = 2 units, 251-300 mg/dl = 4 units, 301-350 mg/dl = 6 units, > 350 mg/dl = 8 units tid w/meals, Toujeo 54 units injection once daily   SE: denies  Long term insulin use: yes  Failed Meds/Previous Tx: Lantus, Novolog, Levemir, Januvia, Metformin IR & ER, Toujeo    Complications: hx proteinuria, CAD    Personal Dexcom G7 CGM  Analysis of data: 10/17/2024 - 10/30/2024  % Time CGM is Active: 72.4%  Sensor download: full report  in media  Average glucose : 228 mg/dl   GMI: 8.8%    Standard deviation: 81 mg/dl   CV (coefficient of variation) : 35.7%     31% time above 180mg /dl   39% time above 250 mg/dl  29% time in target range:  mg/dl   1% time below target range: 70mg/dl     Evaluation   1. Baseline hyperglycemia  2. High glucose variability and excursions  3. Episode of early morning hypoglycemia but not a pattern    *Patient states that the elevated glucose readings were inaccurate readings from Dexcom.  Last few days have returned to normal controlled readings  after sensor change       Overall glucose control:   HGBA1C:    Lab Results   Component Value Date    A1C 6.8 (H) 10/28/2024    A1C 7.7 (H) 07/18/2024    A1C 7.7 (A) 04/22/2024     (H) 07/28/2021          Wt Readings from Last 3 Encounters:   08/28/24 184 lb (83.5 kg)   07/22/24 187 lb (84.8 kg)   05/29/24 188 lb (85.3 kg)           Past History:   He  has a past medical history of Ascending aortic aneurysm ([facility]), Back problem, Calculus of kidney, Cardiovascular function study, abnormal, Coronary atherosclerosis, Diabetes ([facility]) (March 2023), Essential hypertension (08/03/2021), Gall stone, High blood pressure, High cholesterol, Kidney stone (07/29/2020), and Type 2 diabetes mellitus with microalbuminuria, with long-term current use of insulin ([facility]).   His family history includes Cancer in his paternal grandfather; Dementia in his maternal grandmother; Diabetes in his father; Heart Disorder in his mother; High Cholesterol in his father; Obesity in his father; Other in his father.   He  reports that he has never smoked. He has never used smokeless tobacco. He reports current alcohol use of about 4.0 standard drinks of alcohol per week. He reports that he does not use drugs.     He has No Known Allergies.     Current Outpatient Medications on File Prior to Visit   Medication Sig    Insulin Lispro-aabc, 1 U Dial, (LYUMJEV KWIKPEN) 100 UNIT/ML Subcutaneous Solution Pen-injector INJECT USING SLIDING SCALE AS DIRECTED 3X/DAY UP TO TOTAL DAILY DOSE OF 30 UNITS    semaglutide (OZEMPIC, 2 MG/DOSE,) 8 MG/3ML Subcutaneous Solution Pen-injector INJECT 2 MG INTO THE SKIN ONCE A WEEK.    Insulin Pen Needle (NOVOFINE PLUS) 32G X 4 MM Does not apply Misc Inject 1 each into the skin 4 (four) times daily.    Insulin Glargine, 2 Unit Dial, (TOUJEO MAX SOLOSTAR) 300 UNIT/ML Subcutaneous Solution Pen-injector Inject daily as directed up to TDD = 50 units    ROSUVASTATIN 20 MG Oral Tab TAKE 1 TABLET BY MOUTH EVERY DAY AT NIGHT     Continuous Blood Gluc Sensor (DEXCOM G7 SENSOR) Does not apply Misc 1 EACH EVERY 10 DAYS.    Magnesium 100 MG Oral Tab Take by mouth daily.      Omega-3 Fatty Acids (FISH OIL) 435 MG Oral Cap Take by mouth daily.      B Complex Vitamins (VITAMIN B COMPLEX) Oral Tab Take by mouth daily.      aspirin 81 MG Oral Chew Tab Chew 1 tablet (81 mg total) by mouth daily. Take 4 am of procedure     No current facility-administered medications on file prior to visit.       CMP  (most recent labs)   Lab Results   Component Value Date/Time     (H) 07/18/2024 12:12 PM    BUN 15 07/18/2024 12:12 PM    CREATSERUM 0.97 07/18/2024 12:12 PM    GFRNAA 82 12/08/2021 10:04 AM    GFRAA 95 12/08/2021 10:04 AM    EGFRCR 90 07/18/2024 12:12 PM    CA 9.4 07/18/2024 12:12 PM    ALKPHO 74 07/18/2024 12:12 PM    AST 19 07/18/2024 12:12 PM    ALT 21 07/18/2024 12:12 PM    BILT 1.1 07/18/2024 12:12 PM    TP 6.7 07/18/2024 12:12 PM    ALB 4.4 07/18/2024 12:12 PM     07/18/2024 12:12 PM    K 4.2 07/18/2024 12:12 PM     07/18/2024 12:12 PM    CO2 26 07/18/2024 12:12 PM           Lipids  (most recent labs)   Lab Results   Component Value Date/Time    CHOLEST 200 (H) 07/18/2024 12:12 PM    TRIG 104 07/18/2024 12:12 PM    HDL 42 07/18/2024 12:12 PM     (H) 07/18/2024 12:12 PM    NONHDLC 158 (H) 07/18/2024 12:12 PM          Diabetes  (most recent labs)   Lab Results   Component Value Date/Time    A1C 6.8 (H) 10/28/2024 01:33 PM          Microalb (most recent labs)   Lab Results   Component Value Date/Time    MICROALBUMIN 3.1 07/18/2024 12:12 PM    MICROALBCREA 26.6 07/18/2022 03:09 PM        Lab results reviewed with patient.    REVIEW OF SYSTEMS:   GENERAL HEALTH: feels well otherwise  SKIN: denies any unusual skin lesions or rashes  RESPIRATORY: denies shortness of breath with exertion  CARDIOVASCULAR: denies chest pain on exertion  GI: denies abdominal pain and denies heartburn  NEURO: denies headaches     EXAM:   Physical  Exam  Constitutional:       Appearance: Normal appearance.   Pulmonary:      Comments: Able to speak in complete sentences without difficulty  Neurological:      Mental Status: He is alert and oriented to person, place, and time.   Psychiatric:         Mood and Affect: Mood normal.         Behavior: Behavior normal.         ASSESSMENT AND PLAN:   Diabetes:  Patient to continue Ozempic 2mg injection weekly, Lyumjev sliding scale: 200-250 mg/dl = 2 units, 251-300 mg/dl = 4 units, 301-350 mg/dl = 6 units, > 350 mg/dl = 8 units tid w/meals, and Toujeo 54 units injection once daily.  Increase Jardiance to 25mg po daily.   Per ADA guidelines, in patients with type 2 diabetes and established ASCVD, incorporating agent proven to reduce major adverse CV events and CV mortality   GLP-1 Ozempic rx chosen since it not only lowers A1C, but studies have shown it can also reduce the risk of major CV events such as heart attack, stroke, or death in adults with type 2 diabetes who are currently treating their CV disease.  Per FDA indications, Jardiance is shown to reduce the risk of cardiovascular death in adults with type 2 diabetes and established cardiovascular disease, and to reduce the risk of death and hospitalization in patients with heart failure and low ejection fraction when added to standard of care therapies for diabetes and patients with known atherosclerotic cardiovascular disease.  Patient to continue to use personal Dexcom CGM for glucose monitoring.  Discussed self monitoring blood glucose and the importance of monitoring.  Patient agreed to monitoring qid - before meals and 2 hours postprandial of one meal per day if not using CGM.  Hypoglycemia S&S, Rx, and when to call APRN/CDE reviewed using Rule of 15's. Stressed need to call if 2 readings below 80 mg/dl in 1 week for medication adjustment.   Reinforced healthy eating in healthy portions and increasing daily physical activity.     Hyperlipidemia:  Patient to  continue statin therapy.    DM Health Maintenance  Last dilated eye exam: No data recorded Exam shows retinopathy? No data recorded  Last diabetic foot exam: Last Foot Exam: 24    Date of last PHQ-2 depression screen: PHQ-2 - Date of last depression screenin2024    Patient  reports that he has never smoked. He has never used smokeless tobacco.  When is flu vaccine due? No recommendations at this time  When is pneumonia vaccine due? No recommendations at this time    The patient indicates understanding of these issues and agrees to the plan.  Refills addressed at time of office visit.    Diagnoses and all orders for this visit:    Type 2 diabetes mellitus without complication, with long-term current use of insulin (HCC)  -     empagliflozin (JARDIANCE) 25 MG Oral Tab; Take 1 tablet (25 mg total) by mouth daily.  -     COMP METABOLIC PANEL [97360] [Q]  -     HGB A1C [496] [Q]  -     MICROALB/CREAT RATIO, RANDOM URINE [6517] [Q]    Mixed hyperlipidemia  -     LIPID PANEL [7600] [Q]       Return in about 3 months (around 2025) for 45 minute appointment, Personal CGM.    The risks and benefits of my recommendations, as well as other treatment options were discussed with the patient today. questions were answered to the best of my knowledge.  Patient verbalizes understanding and amendable to plan of care.  Duration of this service:  18 minutes   Nani JOHNSON, BC-ADM, [facility]

## 2024-12-09 DIAGNOSIS — Z79.4 TYPE 2 DIABETES MELLITUS WITHOUT COMPLICATION, WITH LONG-TERM CURRENT USE OF INSULIN (HCC): ICD-10-CM

## 2024-12-09 DIAGNOSIS — E11.9 TYPE 2 DIABETES MELLITUS WITHOUT COMPLICATION, WITH LONG-TERM CURRENT USE OF INSULIN (HCC): ICD-10-CM

## 2024-12-10 RX ORDER — PEN NEEDLE, DIABETIC 32GX 5/32"
1 NEEDLE, DISPOSABLE MISCELLANEOUS 4 TIMES DAILY
Qty: 400 EACH | Refills: 1 | Status: SHIPPED | OUTPATIENT
Start: 2024-12-10

## 2024-12-10 RX ORDER — ACYCLOVIR 400 MG/1
1 TABLET ORAL
Qty: 3 EACH | Refills: 1 | Status: SHIPPED | OUTPATIENT
Start: 2024-12-10

## 2024-12-10 NOTE — TELEPHONE ENCOUNTER
Requested Prescriptions     Pending Prescriptions Disp Refills    Continuous Glucose Sensor (DEXCOM G7 SENSOR) Does not apply Misc [Pharmacy Med Name: DEXCOM G7 SENSOR] 3 each 1     Si EACH EVERY 10 DAYS.    Insulin Pen Needle (BD PEN NEEDLE EDA 2ND GEN) 32G X 4 MM Does not apply Misc [Pharmacy Med Name: BD EDA 2 GEN PEN NDL 32G 4MM] 400 each 1     Sig: INJECT 1 EACH INTO THE SKIN 4 (FOUR) TIMES DAILY.     HGBA1C:    Lab Results   Component Value Date    A1C 6.8 (H) 10/28/2024    A1C 7.7 (H) 2024    A1C 7.7 (A) 2024     (H) 2021     Your Appointments      2025 9:45 AM  Diabetes Pump follow up with ELIZABETH Stauffer  [address] ([facility] [place]) [address]  [facility] [address]  [phone]        2025 5:00 PM  Follow Up Visit with Janes Chacko MD  [address] ([facility]) [address] [place] [id number]  [phone]             Last OFFICE VISIT:  2024-    Last Refill:   each with 3 refills -

## 2025-01-28 ENCOUNTER — OFFICE VISIT (OUTPATIENT)
Dept: ENDOCRINOLOGY CLINIC | Facility: CLINIC | Age: 60
End: 2025-01-28
Payer: COMMERCIAL

## 2025-01-28 VITALS
HEART RATE: 70 BPM | RESPIRATION RATE: 18 BRPM | DIASTOLIC BLOOD PRESSURE: 64 MMHG | WEIGHT: 169 LBS | SYSTOLIC BLOOD PRESSURE: 116 MMHG | BODY MASS INDEX: 24 KG/M2

## 2025-01-28 DIAGNOSIS — Z79.4 TYPE 2 DIABETES MELLITUS WITHOUT COMPLICATION, WITH LONG-TERM CURRENT USE OF INSULIN (HCC): Primary | ICD-10-CM

## 2025-01-28 DIAGNOSIS — I10 ESSENTIAL HYPERTENSION: ICD-10-CM

## 2025-01-28 DIAGNOSIS — E11.9 TYPE 2 DIABETES MELLITUS WITHOUT COMPLICATION, WITH LONG-TERM CURRENT USE OF INSULIN (HCC): Primary | ICD-10-CM

## 2025-01-28 DIAGNOSIS — E78.2 MIXED HYPERLIPIDEMIA: ICD-10-CM

## 2025-01-28 LAB
CREAT UR-SCNC: 62.2 MG/DL
HEMOGLOBIN A1C: 6.7 % (ref 4.3–5.6)
MICROALBUMIN UR-MCNC: 1.9 MG/DL
MICROALBUMIN/CREAT 24H UR-RTO: 30.5 UG/MG (ref ?–30)

## 2025-01-28 PROCEDURE — 83036 HEMOGLOBIN GLYCOSYLATED A1C: CPT | Performed by: NURSE PRACTITIONER

## 2025-01-28 PROCEDURE — 82043 UR ALBUMIN QUANTITATIVE: CPT | Performed by: NURSE PRACTITIONER

## 2025-01-28 PROCEDURE — 99214 OFFICE O/P EST MOD 30 MIN: CPT | Performed by: NURSE PRACTITIONER

## 2025-01-28 PROCEDURE — 82570 ASSAY OF URINE CREATININE: CPT | Performed by: NURSE PRACTITIONER

## 2025-01-28 PROCEDURE — 95251 CONT GLUC MNTR ANALYSIS I&R: CPT | Performed by: NURSE PRACTITIONER

## 2025-01-28 RX ORDER — ROSUVASTATIN CALCIUM 20 MG/1
20 TABLET, COATED ORAL NIGHTLY
Qty: 90 TABLET | Refills: 1 | Status: SHIPPED | OUTPATIENT
Start: 2025-01-28

## 2025-01-28 NOTE — PROGRESS NOTES
HPI:   Chester Porter is a 60 year old male who presents for follow up for the management of his diabetes.     Chief Complaint   Patient presents with    Diabetes     Follow up-dexcom     Patient is using personal continuous glucose monitoring or would be testing BGs at least 4 times per day.  Patient is on at least 3 insulin injections per day.   Patient is making self-adjustments in insulin according to blood sugars or carbs.    Diabetes: stable, at goal  Type: 2   Duration:dx 2013 - was initially dx as type 2 then was told he had type 1 after staph infection - DALY and ISLET cell antibodies negative  Current Meds: Ozempic 2mg injection weekly, Jardiance 25mg po daily - patient hasn't taken for several weeks, Toujeo 54 units injection daily,  Lyumjev sliding scale: 200-250 mg/dl = 2 units, 251-300 mg/dl = 4 units, 301-350 mg/dl = 6 units, > 350 mg/dl = 8 units tid w/meals     Long term insulin use: yes  Failed Medications: Lantus, Novolog, Levemir, Januvia, Metformin, Toujeo   Complications: Hx Proteinuria, CAD    Personal  Dexcom G7 CGM  Analysis of data: 1/15/2025 - 1/28/2025  % Time CGM is Active: 85.7%  Sensor download: full report  in media  Average glucose : 156 mg/dl   GMI: 7.0%    Standard deviation: 46 mg/dl   CV (coefficient of variation): 29.3%     24% time above 180mg /dl   3% time above 250 mg/dl  72% time in target range:  mg/dl   1% time below target range: 70mg/dl     Evaluation   1. Baseline glucose stable and mostly within target range  2. Occasional postprandial elevations but mostly stay within target range and return to baseline  3. Low likelihood of hypoglycemia  4. Normal glucose variability      Overall glucose control:   HGBA1C:    Lab Results   Component Value Date    A1C 6.7 (A) 01/28/2025    A1C 6.8 (H) 10/28/2024    A1C 7.7 (H) 07/18/2024     (H) 07/28/2021       Hypertension: stable, at goal  Blood Pressure: 116/64   Medication prescribed: none    SE: lightheadedness when  taking lisinopril     Hyperlipidemia: needs improvement  LDL: 137  Tri  Medication prescribed: rousvastatin, Omega 3  SE: denies     Wt Readings from Last 3 Encounters:   25 169 lb (76.7 kg)   24 184 lb (83.5 kg)   24 187 lb (84.8 kg)     BP Readings from Last 3 Encounters:   25 116/64   24 120/70   24 122/82          Past History:   He  has a past medical history of Ascending aortic aneurysm, Back problem, Calculus of kidney, Cardiovascular function study, abnormal, Coronary atherosclerosis, Diabetes ([facility]) (2023), Essential hypertension (2021), Gall stone, High blood pressure, High cholesterol, Kidney stone (2020), and Type 2 diabetes mellitus with microalbuminuria, with long-term current use of insulin ([facility]).   His family history includes Cancer in his paternal grandfather; Dementia in his maternal grandmother; Diabetes in his father; Heart Disorder in his mother; High Cholesterol in his father; Obesity in his father; Other in his father.   He  reports that he has never smoked. He has never used smokeless tobacco. He reports current alcohol use of about 4.0 standard drinks of alcohol per week. He reports that he does not use drugs.     He has No Known Allergies.     Current Outpatient Medications on File Prior to Visit   Medication Sig    DEXCOM G7 SENSOR Does not apply Misc 1 EACH EVERY 10 DAYS.    BD PEN NEEDLE EDA 2ND GEN 32G X 4 MM Does not apply Misc INJECT 1 EACH INTO THE SKIN 4 (FOUR) TIMES DAILY.    Insulin Lispro-aabc, 1 U Dial, (LYUMJEV KWIKPEN) 100 UNIT/ML Subcutaneous Solution Pen-injector INJECT USING SLIDING SCALE AS DIRECTED 3X/DAY UP TO TOTAL DAILY DOSE OF 30 UNITS    semaglutide (OZEMPIC, 2 MG/DOSE,) 8 MG/3ML Subcutaneous Solution Pen-injector INJECT 2 MG INTO THE SKIN ONCE A WEEK.    Insulin Glargine, 2 Unit Dial, (TOUJEO MAX SOLOSTAR) 300 UNIT/ML Subcutaneous Solution Pen-injector Inject daily as directed up to TDD = 50 units     Magnesium 100 MG Oral Tab Take by mouth daily.      Omega-3 Fatty Acids (FISH OIL) 435 MG Oral Cap Take by mouth daily.      B Complex Vitamins (VITAMIN B COMPLEX) Oral Tab Take by mouth daily.      aspirin 81 MG Oral Chew Tab Chew 1 tablet (81 mg total) by mouth daily. Take 4 am of procedure    empagliflozin (JARDIANCE) 25 MG Oral Tab Take 1 tablet (25 mg total) by mouth daily. (Patient not taking: Reported on 1/28/2025)    [DISCONTINUED] ROSUVASTATIN 20 MG Oral Tab TAKE 1 TABLET BY MOUTH EVERY DAY AT NIGHT     No current facility-administered medications on file prior to visit.       CMP  (most recent labs)   Lab Results   Component Value Date/Time     (H) 07/18/2024 12:12 PM    BUN 15 07/18/2024 12:12 PM    CREATSERUM 0.97 07/18/2024 12:12 PM    GFRNAA 82 12/08/2021 10:04 AM    GFRAA 95 12/08/2021 10:04 AM    EGFRCR 90 07/18/2024 12:12 PM    CA 9.4 07/18/2024 12:12 PM    ALKPHO 74 07/18/2024 12:12 PM    AST 19 07/18/2024 12:12 PM    ALT 21 07/18/2024 12:12 PM    BILT 1.1 07/18/2024 12:12 PM    TP 6.7 07/18/2024 12:12 PM    ALB 4.4 07/18/2024 12:12 PM     07/18/2024 12:12 PM    K 4.2 07/18/2024 12:12 PM     07/18/2024 12:12 PM    CO2 26 07/18/2024 12:12 PM           Lipids  (most recent labs)   Lab Results   Component Value Date/Time    CHOLEST 200 (H) 07/18/2024 12:12 PM    TRIG 104 07/18/2024 12:12 PM    HDL 42 07/18/2024 12:12 PM     (H) 07/18/2024 12:12 PM    NONHDLC 158 (H) 07/18/2024 12:12 PM          Diabetes  (most recent labs)   Lab Results   Component Value Date/Time    A1C 6.7 (A) 01/28/2025 09:46 AM          Microalb (most recent labs)   Lab Results   Component Value Date/Time    MICROALBUMIN 3.1 07/18/2024 12:12 PM    MICROALBCREA 26.6 07/18/2022 03:09 PM        Lab results reviewed with patient.    REVIEW OF SYSTEMS:   GENERAL HEALTH: feels well otherwise  SKIN: denies any unusual skin lesions or rashes  RESPIRATORY: denies shortness of breath with exertion  CARDIOVASCULAR:  denies chest pain on exertion  GI: c/o intermittent diarrhea since November. denies nausea, abdominal pain or heartburn  NEURO: denies headaches and denies numbness and tingling to extremities  ENDO: denies polydipsia, polyuria or polyphagia, denies unexplained weight changes    EXAM:   /64   Pulse 70   Resp 18   Wt 169 lb (76.7 kg)   BMI 24.25 kg/m²  Estimated body mass index is 24.25 kg/m² as calculated from the following:    Height as of 8/28/24: 5' 10\" (1.778 m).    Weight as of this encounter: 169 lb (76.7 kg).   Physical Exam  Vitals reviewed.   Constitutional:       Appearance: Normal appearance.   Cardiovascular:      Pulses:           Dorsalis pedis pulses are 2+ on the right side and 2+ on the left side.   Pulmonary:      Effort: Pulmonary effort is normal.   Feet:      Right foot:      Protective Sensation: 5 sites tested.  5 sites sensed.      Toenail Condition: Right toenails are normal.      Left foot:      Protective Sensation: 5 sites tested.  5 sites sensed.      Toenail Condition: Left toenails are normal.      Comments: Diabetes foot exam performed: Vibration to dorsum to the first toe perceived bilaterally.  Foot care practices reviewed with patient.   Neurological:      Mental Status: He is alert and oriented to person, place, and time.   Psychiatric:         Mood and Affect: Mood normal.         Behavior: Behavior normal.         ASSESSMENT AND PLAN:   As for his Diabetes, it is well controlled, stable.     Recommendations are: continue present meds, check feet daily, and will check labs as ordered.    Patient to continue Ozempic 2mg injection weekly, Toujeo 54 units injection daily and Lyumjev sliding scale: 200-250 mg/dl = 2 units, 251-300 mg/dl = 4 units, 301-350 mg/dl = 6 units, > 350 mg/dl = 8 units tid w/meals.  Restart Jardiance 25mg po daily.    Per ADA guidelines, in patients with type 2 diabetes and established ASCVD, incorporating agent proven to reduce major adverse CV  events and CV mortality   GLP-1 Ozempic rx chosen since it not only lowers A1C, but studies have shown it can also reduce the risk of major CV events such as heart attack, stroke, or death in adults with type 2 diabetes who are currently treating their CV disease.  Per FDA indications, Jardiance is shown to reduce the risk of cardiovascular death in adults with type 2 diabetes and established cardiovascular disease, and to reduce the risk of death and hospitalization in patients with heart failure and low ejection fraction when added to standard of care therapies for diabetes and patients with known atherosclerotic cardiovascular disease.  Patient to continue to use personal Dexcom G7 CGM for glucose monitoring.  Discussed self monitoring blood glucose and the importance of monitoring.  Patient agreed to monitoring qid - before meals and 2 hours postprandial of one meal per day if not using CGM.  Hypoglycemia S&S, Rx, and when to call APRN/CDE reviewed using Rule of 15's. Stressed need to call if 2 readings below 80 mg/dl in 1 week for medication adjustment.   Reinforced healthy eating in healthy portions and increasing daily physical activity.  Patient to have labs done within next week at [facility].    Urine specimen obtained during office visit and sent to lab for urine microalbumin.    Discussed increasing fiber intake and follow up with PCP regarding c/o diarrhea.         As for his hypertension, Blood Pressure is well controlled, stable.   PLAN: reviewed diet, exercise and weight control         As for his cholesterol, Lipids are no significant medication side effects noted, needs further observation, needs improvement, needs to follow diet more regularly.   PLAN: will continue present medications, reviewed diet, exercise and weight control, continue statin therapy.  Reinforced importance of consistency with taking medication., and labs as ordered         DM Health Maintenance  Last dilated eye exam: Last Dilated  Eye Exam: 24     Exam shows retinopathy? Eye Exam shows Diabetic Retinopathy?: No      Last diabetic foot exam: Last Foot Exam: 25      Date of last PHQ-2 depression screen: PHQ-2 - Date of last depression screenin2025      Patient  reports that he has never smoked. He has never used smokeless tobacco.  When is flu vaccine due? No recommendations at this time  When is pneumonia vaccine due? No recommendations at this time    The patient indicates understanding of these issues and agrees to the plan.  Refills addressed at time of office visit.    Diagnoses and all orders for this visit:    Type 2 diabetes mellitus without complication, with long-term current use of insulin (HCC)  -     POC Hemoglobin A1C    Essential hypertension    Mixed hyperlipidemia  -     rosuvastatin 20 MG Oral Tab; Take 1 tablet (20 mg total) by mouth nightly.             Return in about 3 months (around 2025) for 45 minute appointment, Personal CGM.    The risks and benefits of my recommendations, as well as other treatment options were discussed with the patient today. Questions were answered to the best of my knowledge.   30 min spent with patient and >50% time spent counseling and coordinating care related to their office visit.      Nani JOHNSON, BC-ADM, CDCES

## 2025-02-08 DIAGNOSIS — E11.9 TYPE 2 DIABETES MELLITUS WITHOUT COMPLICATION, WITH LONG-TERM CURRENT USE OF INSULIN (HCC): ICD-10-CM

## 2025-02-08 DIAGNOSIS — Z79.4 TYPE 2 DIABETES MELLITUS WITHOUT COMPLICATION, WITH LONG-TERM CURRENT USE OF INSULIN (HCC): ICD-10-CM

## 2025-02-09 DIAGNOSIS — Z79.4 TYPE 2 DIABETES MELLITUS WITHOUT COMPLICATION, WITH LONG-TERM CURRENT USE OF INSULIN (HCC): ICD-10-CM

## 2025-02-09 DIAGNOSIS — E11.9 TYPE 2 DIABETES MELLITUS WITHOUT COMPLICATION, WITH LONG-TERM CURRENT USE OF INSULIN (HCC): ICD-10-CM

## 2025-02-10 ENCOUNTER — TELEPHONE (OUTPATIENT)
Dept: ENDOCRINOLOGY CLINIC | Facility: CLINIC | Age: 60
End: 2025-02-10

## 2025-02-10 RX ORDER — ACYCLOVIR 400 MG/1
TABLET ORAL
Qty: 9 EACH | Refills: 1 | Status: SHIPPED | OUTPATIENT
Start: 2025-02-10

## 2025-02-10 RX ORDER — SEMAGLUTIDE 2.68 MG/ML
2 INJECTION, SOLUTION SUBCUTANEOUS WEEKLY
Qty: 3 ML | Refills: 2 | Status: SHIPPED | OUTPATIENT
Start: 2025-02-10

## 2025-02-10 NOTE — TELEPHONE ENCOUNTER
Requested Prescriptions     Pending Prescriptions Disp Refills    DEXCOM G7 SENSOR Does not apply Misc [Pharmacy Med Name: DEXCOM G7 SENSOR]  1     Sig: USE 1 EVERY 10 DAYS     HGBA1C:          Lab Results   Component Value Date     A1C 6.7 (A) 01/28/2025     A1C 6.8 (H) 10/28/2024     A1C 7.7 (H) 07/18/2024      (H) 07/28/2021      Your Appointments       Wednesday March 05, 2025 5:00 PM  Follow Up Visit with Janes Chacko MD  [address] ([facility]) [address]  [facility] [id number] [phone]          Tuesday May 13, 2025 7:30 AM  Diabetes Pump follow up with ELIZABETH Stauffer  [address] ([facility]) [address] [place] [address]  [facility] [id number] [phone]                 Last OFFICE VISIT: 1--

## 2025-02-10 NOTE — TELEPHONE ENCOUNTER
DAWKINS:[id number]    [facility] has not yet replied to your PA request. Depending on the information you've provided, additional questions may be returned by the plan. You may close this dialog, return to your dashboard, and perform other tasks.    To check for an update later, open this request again from your dashboard.    If [facility] has not replied to your request within 24 hours please contact [facility] at [phone].

## 2025-02-10 NOTE — TELEPHONE ENCOUNTER
Requested Prescriptions     Pending Prescriptions Disp Refills    OZEMPIC, 2 MG/DOSE, 8 MG/3ML Subcutaneous Solution Pen-injector [Pharmacy Med Name: OZEMPIC 8 MG/3 ML (2 MG/DOSE)]  5     Sig: INJECT 2 MG INTO THE SKIN ONCE A WEEK.     HGBA1C:    Lab Results   Component Value Date    A1C 6.7 (A) 01/28/2025    A1C 6.8 (H) 10/28/2024    A1C 7.7 (H) 07/18/2024     (H) 07/28/2021     Your Appointments      Wednesday March 05, 2025 5:00 PM  Follow Up Visit with Janes Chacko MD  [address] ([facility]) [address]  [facility] [address]  [phone]        Tuesday May 13, 2025 7:30 AM  Diabetes Pump follow up with ELIZABETH Stauffer  [address] ([facility] [place]) [address] [facility] [address]  [facility] [address]  [phone]             Last OFFICE VISIT: 1--    Last refill:  8-- 9 ml with 1 refills -

## 2025-02-12 NOTE — TELEPHONE ENCOUNTER
(Key: [id number])    [facility] has not yet replied to your PA request. Depending on the information you've provided, additional questions may be returned by the plan. You may close this dialog, return to your dashboard, and perform other tasks.    To check for an update later, open this request again from your dashboard.    If [facility] has not replied to your request within 24 hours please contact Edilma at [phone].    Has been >24 hours, will try to call plan to get update on Prior Authorization status

## 2025-02-17 NOTE — TELEPHONE ENCOUNTER
Contacted [facility] [phone], status was closed.  Had to renew Prior Authorization which created a new key.    Key: [id number]    [facility] has not yet replied to your PA request. Depending on the information you've provided, additional questions may be returned by the plan. You may close this dialog, return to your dashboard, and perform other tasks.    To check for an update later, open this request again from your dashboard.    If [facility] has not replied to your request within 24 hours please contact [facility] at [phone].

## 2025-02-18 NOTE — TELEPHONE ENCOUNTER
Per cover my meds:    \"Your PA request has been approved. Additional information will be provided in the approval communication. (Message [id number]). Authorization Expiration Date: February 17, 2026.\"    Pharmacy made aware.

## 2025-03-20 ENCOUNTER — OFFICE VISIT (OUTPATIENT)
Dept: FAMILY MEDICINE CLINIC | Facility: CLINIC | Age: 60
End: 2025-03-20
Payer: COMMERCIAL

## 2025-03-20 VITALS
SYSTOLIC BLOOD PRESSURE: 118 MMHG | HEIGHT: 70 IN | OXYGEN SATURATION: 98 % | HEART RATE: 84 BPM | DIASTOLIC BLOOD PRESSURE: 76 MMHG | BODY MASS INDEX: 24.05 KG/M2 | RESPIRATION RATE: 18 BRPM | WEIGHT: 168 LBS

## 2025-03-20 DIAGNOSIS — Z00.00 WELLNESS EXAMINATION: Primary | ICD-10-CM

## 2025-03-20 DIAGNOSIS — Z23 NEED FOR DIPHTHERIA-TETANUS-PERTUSSIS (TDAP) VACCINE: ICD-10-CM

## 2025-03-20 DIAGNOSIS — I77.810 THORACIC AORTIC ECTASIA: ICD-10-CM

## 2025-03-20 DIAGNOSIS — Z00.00 LABORATORY EXAM ORDERED AS PART OF ROUTINE GENERAL MEDICAL EXAMINATION: ICD-10-CM

## 2025-03-20 DIAGNOSIS — I71.21 ANEURYSM OF ASCENDING AORTA WITHOUT RUPTURE: ICD-10-CM

## 2025-03-20 DIAGNOSIS — Z79.4 TYPE 2 DIABETES MELLITUS WITHOUT COMPLICATION, WITH LONG-TERM CURRENT USE OF INSULIN (HCC): ICD-10-CM

## 2025-03-20 DIAGNOSIS — H53.2 DIPLOPIA: ICD-10-CM

## 2025-03-20 DIAGNOSIS — E11.9 TYPE 2 DIABETES MELLITUS WITHOUT COMPLICATION, WITH LONG-TERM CURRENT USE OF INSULIN (HCC): ICD-10-CM

## 2025-03-20 DIAGNOSIS — R19.4 CHANGE IN BOWEL HABITS: ICD-10-CM

## 2025-03-20 PROCEDURE — 99396 PREV VISIT EST AGE 40-64: CPT | Performed by: FAMILY MEDICINE

## 2025-03-20 PROCEDURE — 90471 IMMUNIZATION ADMIN: CPT | Performed by: FAMILY MEDICINE

## 2025-03-20 PROCEDURE — 90715 TDAP VACCINE 7 YRS/> IM: CPT | Performed by: FAMILY MEDICINE

## 2025-03-20 NOTE — PATIENT INSTRUCTIONS
Health Screening Guidelines, Men Ages 50 to 64   Screening tests and health counseling are a key part of managing your health. A screening test is done to find disorders or diseases in people who don't have any symptoms. Screening tests are not used to diagnose. They are used to find out if more testing is needed. The goal may be to find a disease early so it can be treated with more success. Or the goal may be to find a disease early so you can make lifestyle changes. You may need regular checkups to help you reduce your risk of disease.   Below are guidelines for men ages 50 to 64. Talk with your healthcare provider. Make sure you’re up-to-date on what you need.   We understand gender is a spectrum. We may use gendered terms to talk about anatomy and health risk. Please use this information in a way that works best for you and your provider as you talk about your care.   Screening  Who needs it How often    Unhealthy alcohol use  All men in this age group  At routine exams   Blood pressure All men in this age group  Once a year if your blood pressure is normal. Normal blood pressure is less than 120/80 mm Hg. If your blood pressure is higher than this, follow the advice of your healthcare provider.    Colorectal cancer All men at average risk in this age group  Talk with your healthcare provider about which test below is right for you:   Colonoscopy every 10 years  Flexible sigmoidoscopy every 5 years (or every 10 years with yearly fecal immunochemical test (FIT) stool test)  CT colonography (virtual colonoscopy) every 5 years  Yearly fecal occult blood test  Yearly FIT  Stool DNA test every 1 to 3 years  If you have a test that is not a colonoscopy and have an abnormal test result, you will need a colonoscopy.   You may need to be screened more or less often. This is based on personal or family health history. Talk with your healthcare provider.    Depression All men in this age group  At routine exams   Type 2  diabetes or prediabetes  All in this age group  At least every 3 years (yearly if your blood sugar has already begun to rise)    Type 2 diabetes All men with prediabetes  Every year   Hepatitis C Men at higher risk for infection. Test 1 time for men born between 1945 and 1965.  Talk with your healthcare provider.    High cholesterol or triglycerides  All men in this age group  At least every 4 to 6 years. Talk with your healthcare provider about your risk. Ask if you should be tested more often.    HIV All men in this age group  At least 1 time. Talk with your healthcare provider about your risk factors. Ask if you should be tested more often.    Lung cancer All men in this age group who are in fairly good health and are at higher risk for lung cancer, and who:   Smoke or quit in the past 15 years  Have a 20-pack per year smoking history (1 pack a day for 20 years or 2 packs a day for 10 years)  Expert groups vary in their advice. Talk with your healthcare provider.  Yearly lung cancer screening with a low-dose CT scan (LDCT). Talk with your healthcare provider.    Obesity All men in this age group  At yearly routine exams    BMI (body mass index) All men in this age group Every year, to help find out if you are at a healthy weight for your height    Prostate cancer All men in this age group, talk with your healthcare provider about risks and benefits of a digital rectal exam (JEFFREY) and prostate-specific antigen (PSA) screening  At routine exams if you decide to be tested.    Syphilis Men at higher risk for infection  At routine exams. Talk with your healthcare provider.    Tuberculosis Men at higher risk for infection  Talk with your healthcare provider    Vision All men in this age group  Talk with your healthcare provider   Health counseling  Who needs it  How often    Diet and exercise Men who are overweight or obese  When diagnosed, and then at routine exams    Sexually transmitted infection (STI) prevention   Men at higher risk for infection  At routine exams; talk with your healthcare provider    Use of tobacco and the health effects it can cause  All men in this age group  Every exam   [employer] last reviewed this educational content on 5/1/2021 © 2000-2023 [employer]. All rights reserved. This information is not intended as a substitute for professional medical care. Always follow your healthcare professional's instructions.

## 2025-03-20 NOTE — PROGRESS NOTES
HPI:     History of Present Illness  The patient presents for routine physical and 2 new concerns.     Firstly, he has been experiencing persistent digestive issues characterized by diarrhea and urgency. The symptoms have been ongoing and have not been linked to any medication. He is on semaglutide for his diabetes. The patient has been self-managing with Metamucil, which has provided some relief, but he notes that finding the right balance of fiber intake is challenging. The patient reports that his bowel movements have been very loose, but have improved slightly with increased Metamucil intake. However, he also notes that he sometimes has to wait for up to two days between bowel movements. The patient is due for a colonoscopy this year. He denies any abdominal pain, nausea, vomiting.     Secondly, the patient reports experiencing vision problems, specifically double vision when looking to the right side. These symptoms started recently, around late March, and were initially associated with sinus pressure. The patient notes that the double vision is more pronounced when looking to right side and is particularly noticeable when looking at a computer screen. The patient also mentions a history of dry eye, which he has managed with various drops and a heat pad. The patient has not noticed any other vision issues such as tunnel vision or cloudy vision. He does report that he has had a change in job position, which has required more screen time.     Otherwise his DM has been controlled, Last A1c value was 6.7% done 1/28/2025. He is following with DM clinic. He remains consistent with his regiment.         Allergies:   No Known Allergies    CURRENT MEDICATIONS   Current Outpatient Medications   Medication Sig Dispense Refill    semaglutide (OZEMPIC, 2 MG/DOSE,) 8 MG/3ML Subcutaneous Solution Pen-injector INJECT 2 MG INTO THE SKIN ONCE A WEEK. 3 mL 2    Continuous Glucose Sensor (DEXCOM G7 SENSOR) Does not apply Misc  USE 1 EVERY 10 DAYS 9 each 1    rosuvastatin 20 MG Oral Tab Take 1 tablet (20 mg total) by mouth nightly. 90 tablet 1    BD PEN NEEDLE EDA 2ND GEN 32G X 4 MM Does not apply Misc INJECT 1 EACH INTO THE SKIN 4 (FOUR) TIMES DAILY. 400 each 1    empagliflozin (JARDIANCE) 25 MG Oral Tab Take 1 tablet (25 mg total) by mouth daily. 90 tablet 1    Insulin Lispro-aabc, 1 U Dial, (LYUMJEV KWIKPEN) 100 UNIT/ML Subcutaneous Solution Pen-injector INJECT USING SLIDING SCALE AS DIRECTED 3X/DAY UP TO TOTAL DAILY DOSE OF 30 UNITS 15 mL 1    Insulin Glargine, 2 Unit Dial, (TOUJEO MAX SOLOSTAR) 300 UNIT/ML Subcutaneous Solution Pen-injector Inject daily as directed up to TDD = 50 units      Magnesium 100 MG Oral Tab Take by mouth daily.        Omega-3 Fatty Acids (FISH OIL) 435 MG Oral Cap Take by mouth daily.        B Complex Vitamins (VITAMIN B COMPLEX) Oral Tab Take by mouth daily.        aspirin 81 MG Oral Chew Tab Chew 1 tablet (81 mg total) by mouth daily. Take 4 am of procedure        HISTORICAL INFORMATION   Past Medical History:    Ascending aortic aneurysm    Back problem    Calculus of kidney    Cardiovascular function study, abnormal    Coronary atherosclerosis    Diabetes (HCC)    Essential hypertension    Gall stone    High blood pressure    High cholesterol    Kidney stone    Type 2 diabetes mellitus with microalbuminuria, with long-term current use of insulin (HCC)      Past Surgical History:   Procedure Laterality Date    Angiogram  FALL 2013    Aortic aneurysm 5-5.4cm diam      Appendectomy  01/2006    Cabg  2013    Cardiac catheterization  12/02/2013    Procedure: HEART ASCENDING AORTA REPLACEMENT;  Surgeon: Andrew Oliva MD;  Location:  [facility]    Cholecystectomy  04/30/2021    Colonoscopy      Colonoscopy N/A 01/08/2018    Procedure: COLONOSCOPY;  Surgeon: Kirit Cody MD;  Location:  ENDOSCOPY    Colonoscopy N/A 06/18/2021    Procedure: COLONOSCOPY with ORISE injection, hot polypectomy, and clip  placement;  Surgeon: Ankush Waller DO;  Location:  ENDOSCOPY    Lithotripsy Right 09/24/2020    right ESWL with right RPG    Other surgical history  04/2013    staph infection    Tonsillectomy  1967      Family History   Problem Relation Age of Onset    Heart Disorder Mother     Diabetes Father     High Cholesterol Father     Obesity Father     Other (Other) Father         L BKA    Dementia Maternal Grandmother     Cancer Paternal Grandfather         Lung Cancer - Heavy Smoker      SOCIAL HISTORY   Social History     Socioeconomic History    Marital status: Single   Tobacco Use    Smoking status: Never    Smokeless tobacco: Never   Vaping Use    Vaping status: Never Used   Substance and Sexual Activity    Alcohol use: Yes     Alcohol/week: 4.0 standard drinks of alcohol     Types: 4 Cans of beer per week     Comment: SOCIAL    Drug use: No   Other Topics Concern    Caffeine Concern No    Stress Concern No    Weight Concern No    Special Diet No    Exercise Yes     Comment: Yes I exercise    Seat Belt Yes     Comment: Yes I wear a seat belt     Social History     Social History Narrative    Not on file        REVIEW OF SYSTEMS:     Constitutional: negative  Eyes:  see HPI  ENT: negative  Respiratory: negative  Cardiovascular: negative  Gastrointestinal:  see HPI  Integument/Breast: negative  Genitourinary: negative  Heme/Lymph: negative  Musculoskeletal: negative  Neurological: negative  Psych: negative  Endocrine: negative  Allergic/Immune: negative    EXAM:   /76   Pulse 84   Resp 18   Ht 5' 10\" (1.778 m)   Wt 168 lb (76.2 kg)   SpO2 98%   BMI 24.11 kg/m²    Wt Readings from Last 6 Encounters:   03/20/25 168 lb (76.2 kg)   01/28/25 169 lb (76.7 kg)   08/28/24 184 lb (83.5 kg)   07/22/24 187 lb (84.8 kg)   05/29/24 188 lb (85.3 kg)   04/22/24 188 lb 12.8 oz (85.6 kg)     Body mass index is 24.11 kg/m².    General: alert, appears stated age, and cooperative  Head: Normocephalic, without obvious  abnormality, atraumatic  Eyes: conjunctivae/corneas clear. PERRL, EOM's intact. Fundi benign.  Ears: normal TM's and external ear canals both ears  Nose: Nares normal. Septum midline. Mucosa normal. No drainage or sinus tenderness.  Throat: lips, mucosa, and tongue normal; teeth and gums normal  Neck: no adenopathy, supple, symmetrical, trachea midline, and thyroid not enlarged, symmetric, no tenderness/mass/nodules  Heart: S1, S2 normal, no murmur, click, rub or gallop, regular rate and rhythm  Lungs: clear to auscultation bilaterally  Chest wall: no tenderness  Abdomen: soft, non-tender; bowel sounds normal; no masses,  no organomegaly  : deferred  Back: negative  Extremities: extremities normal, atraumatic, no cyanosis or edema  Pulses: 2+ and symmetric  Skin: Skin color, texture, turgor normal. No rashes or lesions  Lymph Nodes:  No LAD  Neurologic: Grossly normal    ASSESSMENT AND PLAN:   Chester was seen today for annual and immunization/injection.    Diagnoses and all orders for this visit:    Wellness examination  -Immunizations: UTD  -Metabolic: BMI 24. BP wnl. Due for annual labs   -Cancer screening: due for CRC screening   -Communicable disease: low risk   -Mental health: no concerns   -Other preventative: follow with dentistry and optometry.   -Lifestyle: Follow a well balanced healthy diet with emphasis on fruits, vegetables, whole grains, lean meats. Limit processed and junk foods. Aim for at least 150 minutes of moderate intensity exercise weekly. Make sure you are staying adequately hydrated. Aim to get 7-9 hours of sleep nightly.     Laboratory exam ordered as part of routine general medical examination  -     TSH [899] [Q]  -     CBC [6399] [Q]    Need for diphtheria-tetanus-pertussis (Tdap) vaccine  -     TETANUS, DIPHTHERIA TOXOIDS AND ACELLULAR PERTUSIS VACCINE (TDAP), >7 YEARS, IM USE    Type 2 diabetes mellitus without complication, with long-term current use of insulin (HCC)  Well controlled,  CPM. Following with DM clinic.    Change in bowel habits  Unclear of underlying etiology. Will refer to GI For evaluation as he is also due for colonoscopy.   -     Gastro Referral - In Network    [facility]  Eye exam wnl today. He will f/u with his ophthalmologist.   Will check labs. Will r/o thyroid disease.     Thoracic aortic ectasia  Stable, following with cardiology.    Aneurysm of ascending aorta without rupture  Stable, following with cardiology.      Patient Instructions     Health Screening Guidelines, Men Ages 50 to 64   Screening tests and health counseling are a key part of managing your health. A screening test is done to find disorders or diseases in people who don't have any symptoms. Screening tests are not used to diagnose. They are used to find out if more testing is needed. The goal may be to find a disease early so it can be treated with more success. Or the goal may be to find a disease early so you can make lifestyle changes. You may need regular checkups to help you reduce your risk of disease.   Below are guidelines for men ages 50 to 64. Talk with your healthcare provider. Make sure you’re up-to-date on what you need.   We understand gender is a spectrum. We may use gendered terms to talk about anatomy and health risk. Please use this information in a way that works best for you and your provider as you talk about your care.   Screening  Who needs it How often    Unhealthy alcohol use  All men in this age group  At routine exams   Blood pressure All men in this age group  Once a year if your blood pressure is normal. Normal blood pressure is less than 120/80 mm Hg. If your blood pressure is higher than this, follow the advice of your healthcare provider.    Colorectal cancer All men at average risk in this age group  Talk with your healthcare provider about which test below is right for you:   Colonoscopy every 10 years  Flexible sigmoidoscopy every 5 years (or every 10 years with yearly  fecal immunochemical test (FIT) stool test)  CT colonography (virtual colonoscopy) every 5 years  Yearly fecal occult blood test  Yearly FIT  Stool DNA test every 1 to 3 years  If you have a test that is not a colonoscopy and have an abnormal test result, you will need a colonoscopy.   You may need to be screened more or less often. This is based on personal or family health history. Talk with your healthcare provider.    Depression All men in this age group  At routine exams   Type 2 diabetes or prediabetes  All in this age group  At least every 3 years (yearly if your blood sugar has already begun to rise)    Type 2 diabetes All men with prediabetes  Every year   Hepatitis C Men at higher risk for infection. Test 1 time for men born between 1945 and 1965.  Talk with your healthcare provider.    High cholesterol or triglycerides  All men in this age group  At least every 4 to 6 years. Talk with your healthcare provider about your risk. Ask if you should be tested more often.    HIV All men in this age group  At least 1 time. Talk with your healthcare provider about your risk factors. Ask if you should be tested more often.    Lung cancer All men in this age group who are in fairly good health and are at higher risk for lung cancer, and who:   Smoke or quit in the past 15 years  Have a 20-pack per year smoking history (1 pack a day for 20 years or 2 packs a day for 10 years)  Expert groups vary in their advice. Talk with your healthcare provider.  Yearly lung cancer screening with a low-dose CT scan (LDCT). Talk with your healthcare provider.    Obesity All men in this age group  At yearly routine exams    BMI (body mass index) All men in this age group Every year, to help find out if you are at a healthy weight for your height    Prostate cancer All men in this age group, talk with your healthcare provider about risks and benefits of a digital rectal exam (JEFFREY) and prostate-specific antigen (PSA) screening  At  routine exams if you decide to be tested.    Syphilis Men at higher risk for infection  At routine exams. Talk with your healthcare provider.    Tuberculosis Men at higher risk for infection  Talk with your healthcare provider    Vision All men in this age group  Talk with your healthcare provider   Health counseling  Who needs it  How often    Diet and exercise Men who are overweight or obese  When diagnosed, and then at routine exams    Sexually transmitted infection (STI) prevention  Men at higher risk for infection  At routine exams; talk with your healthcare provider    Use of tobacco and the health effects it can cause  All men in this age group  Every exam   [employer] last reviewed this educational content on 5/1/2021 © 2000-2023 [employer]. All rights reserved. This information is not intended as a substitute for professional medical care. Always follow your healthcare professional's instructions.          The patient indicates understanding of these issues and agrees to the plan.    Problem List:  Patient Active Problem List   Diagnosis    Internal derangement of left knee    Thoracic aortic ectasia    Ascending aortic aneurysm    Mixed hyperlipidemia    Tubular adenoma of colon    Chondromalacia patellae, left    Trochanteric bursitis, left hip    Nevus    Kidney stone    Essential hypertension    Type 2 diabetes mellitus without complication, with long-term current use of insulin ([facility])       Janes Chacko MD  3/20/2025  12:55 PM

## 2025-03-20 NOTE — PROGRESS NOTES
The following individual(s) verbally consented to be recorded using ambient AI listening technology and understand that they can each withdraw their consent to this listening technology at any point by asking the clinician to turn off or pause the recording:    Patient name: Chester Porter  Additional names:

## 2025-04-29 ENCOUNTER — HOSPITAL ENCOUNTER (OUTPATIENT)
Dept: GENERAL RADIOLOGY | Age: 60
Discharge: HOME | End: 2025-04-29
Attending: STUDENT IN AN ORGANIZED HEALTH CARE EDUCATION/TRAINING PROGRAM
Payer: COMMERCIAL

## 2025-04-29 DIAGNOSIS — Z79.4 TYPE 2 DIABETES MELLITUS WITHOUT COMPLICATION, WITH LONG-TERM CURRENT USE OF INSULIN (HCC): ICD-10-CM

## 2025-04-29 DIAGNOSIS — R19.8 ALTERED BOWEL FUNCTION: ICD-10-CM

## 2025-04-29 DIAGNOSIS — E11.9 TYPE 2 DIABETES MELLITUS WITHOUT COMPLICATION, WITH LONG-TERM CURRENT USE OF INSULIN (HCC): ICD-10-CM

## 2025-04-29 PROCEDURE — 74019 RADEX ABDOMEN 2 VIEWS: CPT | Performed by: STUDENT IN AN ORGANIZED HEALTH CARE EDUCATION/TRAINING PROGRAM

## 2025-04-29 RX ORDER — EMPAGLIFLOZIN 25 MG/1
25 TABLET, FILM COATED ORAL DAILY
Qty: 90 TABLET | Refills: 1 | Status: SHIPPED | OUTPATIENT
Start: 2025-04-29

## 2025-04-29 NOTE — TELEPHONE ENCOUNTER
Requested Prescriptions     Pending Prescriptions Disp Refills    JARDIANCE 25 MG Oral Tab [Pharmacy Med Name: JARDIANCE 25 MG TABLET] 90 tablet 1     Sig: TAKE 1 TABLET (25 MG TOTAL) BY MOUTH DAILY.     HGBA1C:    Lab Results   Component Value Date    A1C 6.7 (A) 01/28/2025    A1C 6.8 (H) 10/28/2024    A1C 7.7 (H) 07/18/2024     (H) 07/28/2021     Your Appointments      Tuesday April 29, 2025 7:00 PM  XRAY ABDOMEN OBSTRUCTIVE SERIES with PF XR RM1  [facility] X-ray [facility] ([facility]) [address] [facility] [address] [phone]   Your appointment is in the X-ray department on the MAIN FLOOR of the building.  After entering the main door, proceed straight ahead to Registration where you will check in for your appointment.       Tuesday May 13, 2025 7:30 AM  Diabetes Pump follow up with ELIZABETH Stauffer  [address] ([facility]) [address] [facility] [phone]        Wednesday May 28, 2025 8:30 AM  Colonoscopy with Acosta Stanley DO  [facility] ([facility]) [address] [place] [address] [phone]   Please arrive 60 minutes prior to your scheduled procedure time.          Wednesday May 28, 2025 9:00 AM  EGD with Acosta Stanley DO  [facility] ([facility]) [address] [place] [address] [phone]   Please arrive 60 minutes prior to your scheduled procedure time.          Thursday September 25, 2025 12:40 PM  Follow Up Visit with Janes Chacko MD  [address] ([facility]) [address] [address] [phone]   Contact your primary care provider if your insurance requires a referral.    Please arrive 15 minutes prior to your scheduled appointment. Be sure to bring your current Insurance card, Photo ID, and medication bottles or a list of  your current medications.      A 24 hour notice is required to cancel any appointment or you may be charged a $40 No Show Fee.     Important: 24 hour notice is required to cancel any appointment or you may be charged a $40 No Show Fee. Please notify your physician office.                 Last Office Visit: 1--    Last Refill: 11--90 tabs with 1 refills-DH

## 2025-05-08 DIAGNOSIS — E11.9 TYPE 2 DIABETES MELLITUS WITHOUT COMPLICATION, WITH LONG-TERM CURRENT USE OF INSULIN (HCC): ICD-10-CM

## 2025-05-08 DIAGNOSIS — Z79.4 TYPE 2 DIABETES MELLITUS WITHOUT COMPLICATION, WITH LONG-TERM CURRENT USE OF INSULIN (HCC): ICD-10-CM

## 2025-05-08 LAB
ABSOLUTE BASOPHILS: 53 CELLS/UL (ref 0–200)
ABSOLUTE EOSINOPHILS: 178 CELLS/UL (ref 15–500)
ABSOLUTE LYMPHOCYTES: 2066 CELLS/UL (ref 850–3900)
ABSOLUTE MONOCYTES: 508 CELLS/UL (ref 200–950)
ABSOLUTE NEUTROPHILS: 3795 CELLS/UL (ref 1500–7800)
ALBUMIN/GLOBULIN RATIO: 2.2 (CALC) (ref 1–2.5)
ALBUMIN: 4.6 G/DL (ref 3.6–5.1)
ALKALINE PHOSPHATASE: 86 U/L (ref 35–144)
ALT: 26 U/L (ref 9–46)
AST: 17 U/L (ref 10–35)
BASOPHILS: 0.8 %
BILIRUBIN, TOTAL: 1 MG/DL (ref 0.2–1.2)
BUN: 19 MG/DL (ref 7–25)
CALCIUM: 9.4 MG/DL (ref 8.6–10.3)
CARBON DIOXIDE: 27 MMOL/L (ref 20–32)
CHLORIDE: 100 MMOL/L (ref 98–110)
CHOL/HDLC RATIO: 3.7 (CALC)
CHOLESTEROL, TOTAL: 182 MG/DL
CREATININE: 1.09 MG/DL (ref 0.7–1.3)
EGFR: 78 ML/MIN/1.73M2
EOSINOPHILS: 2.7 %
GLOBULIN: 2.1 G/DL (CALC) (ref 1.9–3.7)
GLUCOSE: 184 MG/DL (ref 65–99)
HDL CHOLESTEROL: 49 MG/DL
HEMATOCRIT: 51 % (ref 38.5–50)
HEMOGLOBIN: 16.6 G/DL (ref 13.2–17.1)
LDL-CHOLESTEROL: 108 MG/DL (CALC)
LYMPHOCYTES: 31.3 %
MCH: 30.5 PG (ref 27–33)
MCHC: 32.5 G/DL (ref 32–36)
MCV: 93.6 FL (ref 80–100)
MONOCYTES: 7.7 %
MPV: 8.9 FL (ref 7.5–12.5)
NEUTROPHILS: 57.5 %
NON-HDL CHOLESTEROL: 133 MG/DL (CALC)
PLATELET COUNT: 298 THOUSAND/UL (ref 140–400)
POTASSIUM: 4.1 MMOL/L (ref 3.5–5.3)
PROTEIN, TOTAL: 6.7 G/DL (ref 6.1–8.1)
RDW: 12.9 % (ref 11–15)
RED BLOOD CELL COUNT: 5.45 MILLION/UL (ref 4.2–5.8)
SODIUM: 138 MMOL/L (ref 135–146)
TRIGLYCERIDES: 130 MG/DL
TSH: 1.22 MIU/L (ref 0.4–4.5)
WHITE BLOOD CELL COUNT: 6.6 THOUSAND/UL (ref 3.8–10.8)

## 2025-05-08 RX ORDER — SEMAGLUTIDE 2.68 MG/ML
2 INJECTION, SOLUTION SUBCUTANEOUS WEEKLY
Qty: 9 ML | Refills: 1 | Status: SHIPPED | OUTPATIENT
Start: 2025-05-08

## 2025-05-08 NOTE — TELEPHONE ENCOUNTER
Requested Prescriptions     Pending Prescriptions Disp Refills    OZEMPIC, 2 MG/DOSE, 8 MG/3ML Subcutaneous Solution Pen-injector [Pharmacy Med Name: OZEMPIC 8 MG/3 ML (2 MG/DOSE)]  2     Sig: INJECT 2 MG INTO THE SKIN ONCE A WEEK.     Your appointments       Date & Time Appointment Department ([place])    May 13, 2025 7:30 AM CDT Diabetes Pump follow up with Nani Aguilar APRN [address] ([facility] [place])        May 28, 2025 8:30 AM CDT Colonoscopy with Acosta Stanley DO [facility] ([facility])    Please arrive 60 minutes prior to your scheduled procedure time.         May 28, 2025 9:00 AM CDT EGD with Acosta Stanley DO [facility] ([facility])    Please arrive 60 minutes prior to your scheduled procedure time.         Sep 25, 2025 12:40 PM CDT Follow Up Visit with Janes Chacko MD [address] ([facility])    Contact your primary care provider if your insurance requires a referral.    Please arrive 15 minutes prior to your scheduled appointment. Be sure to bring your current Insurance card, Photo ID, and medication bottles or a list of your current medications.      A 24 hour notice is required to cancel any appointment or you may be charged a $40 No Show Fee.     Important: 24 hour notice is required to cancel any appointment or you may be charged a $40 No Show Fee. Please notify your physician office.               [address]  [address]  [facility] [phone] [address]  [facility] [place]  [address]  [facility] [phone] [facility]  [facility]  [address] [facility] [address] [phone]         Last A1c value was 6.7% done 1/28/2025.   Last office visit:  1/28/25 - DH  Last refill: 2/10/25 - DH

## 2025-05-13 ENCOUNTER — OFFICE VISIT (OUTPATIENT)
Dept: ENDOCRINOLOGY CLINIC | Facility: CLINIC | Age: 60
End: 2025-05-13
Payer: COMMERCIAL

## 2025-05-13 VITALS
RESPIRATION RATE: 18 BRPM | HEART RATE: 70 BPM | WEIGHT: 176 LBS | SYSTOLIC BLOOD PRESSURE: 120 MMHG | BODY MASS INDEX: 31 KG/M2 | DIASTOLIC BLOOD PRESSURE: 66 MMHG

## 2025-05-13 DIAGNOSIS — Z79.4 TYPE 2 DIABETES MELLITUS WITHOUT COMPLICATION, WITH LONG-TERM CURRENT USE OF INSULIN (HCC): Primary | ICD-10-CM

## 2025-05-13 DIAGNOSIS — E11.9 TYPE 2 DIABETES MELLITUS WITHOUT COMPLICATION, WITH LONG-TERM CURRENT USE OF INSULIN (HCC): Primary | ICD-10-CM

## 2025-05-13 DIAGNOSIS — I10 ESSENTIAL HYPERTENSION: ICD-10-CM

## 2025-05-13 DIAGNOSIS — E78.2 MIXED HYPERLIPIDEMIA: ICD-10-CM

## 2025-05-13 LAB — HEMOGLOBIN A1C: 6.7 % (ref 4.3–5.6)

## 2025-05-13 PROCEDURE — 99214 OFFICE O/P EST MOD 30 MIN: CPT | Performed by: NURSE PRACTITIONER

## 2025-05-13 PROCEDURE — 83036 HEMOGLOBIN GLYCOSYLATED A1C: CPT | Performed by: NURSE PRACTITIONER

## 2025-05-13 PROCEDURE — 95251 CONT GLUC MNTR ANALYSIS I&R: CPT | Performed by: NURSE PRACTITIONER

## 2025-05-13 RX ORDER — INSULIN DEGLUDEC 200 U/ML
INJECTION, SOLUTION SUBCUTANEOUS
Qty: 27 ML | Refills: 1 | Status: SHIPPED | OUTPATIENT
Start: 2025-05-13

## 2025-05-13 NOTE — PROGRESS NOTES
HPI:   Chester Porter is a 60 year old male who presents for follow up for the management of his diabetes.     Chief Complaint   Patient presents with    Diabetes     Follow up-Dexcom     Patient is using personal continuous glucose monitoring or would be testing BGs at least 4 times per day.  Patient is on at least 3 insulin injections per day.   Patient is making self-adjustments in insulin according to blood sugars or carbs.    Diabetes: stable, at goal  Type: 2   Duration:dx  - was initially dx as type 2 then was told he had type 1 after staph infection - DALY and ISLET cell antibodies negative  Current Meds: Ozempic 2mg injection weekly, Jardiance 25mg po daily, Toujeo 54 units injection daily,  Lyumjev sliding scale: 200-250 mg/dl = 2 units, 251-300 mg/dl = 4 units, 301-350 mg/dl = 6 units, > 350 mg/dl = 8 units tid w/meals     Long term insulin use: yes  Failed Medications: Lantus, Novolog, Levemir, Januvia, Metformin, Toujeo   Complications: Hx Proteinuria, CAD    Personal  Dexcom G7 CGM  Analysis of data: 2025 - 2025  % Time CGM is Active: 99.2%  Sensor download: full report  in media  Average glucose : 214 mg/dl   GMI: 8.4%    Standard deviation: 70 mg/dl   CV (coefficient of variation): 32.7%     31% time above 180mg /dl   31% time above 250 mg/dl  38% time in target range:  mg/dl   0% time below target range: 70mg/dl     Evaluation   1. Baseline hyperglycemia from May 4 - May 8th  2. Episodes of postprandial elevations  3. Low likelihood of hypoglycemia  4. Increased glucose variability      Overall glucose control:   HGBA1C:    Lab Results   Component Value Date    A1C 6.7 (A) 2025    A1C 6.7 (A) 2025    A1C 6.8 (H) 10/28/2024     (H) 2021       Hypertension: stable, at goal  Blood Pressure: 120/66   Medication prescribed: none    SE: lightheadedness when taking lisinopril     Hyperlipidemia: needs improvement  LDL: 108  Tri  Medication prescribed:  rousvastatin, Omega 3  SE: denies     Wt Readings from Last 3 Encounters:   05/13/25 176 lb (79.8 kg)   05/09/25 173 lb (78.5 kg)   04/22/25 176 lb 6.4 oz (80 kg)     BP Readings from Last 3 Encounters:   05/13/25 120/66   04/22/25 155/89   03/20/25 118/76          Past History:   He  has a past medical history of Ascending aortic aneurysm, Back problem, Blurred vision (March 2025), Calculus of kidney, Cardiovascular function study, abnormal, Coronary atherosclerosis, Diabetes (HCC) (March 2023), Diabetes mellitus (HCC) (2003), Diarrhea, unspecified (Dec 2024 or Jan 2025), Essential hypertension (08/03/2021), Gall stone, Gallbladder disease (2021), High blood pressure, High cholesterol, Irregular bowel habits (Dec 2024 or Jan 2025), Kidney stone (07/29/2020), KIDNEY STONE (2019), Nausea (March 2025), Pain (Oct 2021), Type 2 diabetes mellitus with microalbuminuria, with long-term current use of insulin (HCC), Uncomfortable fullness after meals (Approx 2023), and Weight loss (mid 2023).   His family history includes Breast Cancer in his maternal cousin female and maternal cousin female; Cancer in his paternal grandfather; Dementia in his maternal grandmother; Diabetes in his father; Heart Disease in his mother; Heart Disorder in his mother; High Cholesterol in his father; Obesity in his father; Other in his father.   He  reports that he has never smoked. He has never used smokeless tobacco. He reports current alcohol use of about 4.0 standard drinks of alcohol per week. He reports that he does not use drugs.     He has no known allergies.     Current Outpatient Medications on File Prior to Visit   Medication Sig    semaglutide (OZEMPIC, 2 MG/DOSE,) 8 MG/3ML Subcutaneous Solution Pen-injector INJECT 2 MG INTO THE SKIN ONCE A WEEK.    JARDIANCE 25 MG Oral Tab TAKE 1 TABLET (25 MG TOTAL) BY MOUTH DAILY.    Continuous Glucose Sensor (DEXCOM G7 SENSOR) Does not apply Misc USE 1 EVERY 10 DAYS    rosuvastatin 20 MG Oral Tab  Take 1 tablet (20 mg total) by mouth nightly.    BD PEN NEEDLE EDA 2ND GEN 32G X 4 MM Does not apply Misc INJECT 1 EACH INTO THE SKIN 4 (FOUR) TIMES DAILY.    Insulin Lispro-aabc, 1 U Dial, (LYUMJEV KWIKPEN) 100 UNIT/ML Subcutaneous Solution Pen-injector INJECT USING SLIDING SCALE AS DIRECTED 3X/DAY UP TO TOTAL DAILY DOSE OF 30 UNITS    Magnesium 100 MG Oral Tab Take by mouth daily.      Omega-3 Fatty Acids (FISH OIL) 435 MG Oral Cap Take by mouth daily.      B Complex Vitamins (VITAMIN B COMPLEX) Oral Tab Take by mouth daily.      aspirin 81 MG Oral Chew Tab Chew 1 tablet (81 mg total) by mouth daily. Take 4 am of procedure     No current facility-administered medications on file prior to visit.       CMP  (most recent labs)   Lab Results   Component Value Date/Time     (H) 05/07/2025 09:05 AM    BUN 19 05/07/2025 09:05 AM    CREATSERUM 1.09 05/07/2025 09:05 AM    GFRNAA 82 12/08/2021 10:04 AM    GFRAA 95 12/08/2021 10:04 AM    EGFRCR 78 05/07/2025 09:05 AM    CA 9.4 05/07/2025 09:05 AM    ALKPHO 86 05/07/2025 09:05 AM    AST 17 05/07/2025 09:05 AM    ALT 26 05/07/2025 09:05 AM    BILT 1.0 05/07/2025 09:05 AM    TP 6.7 05/07/2025 09:05 AM    ALB 4.6 05/07/2025 09:05 AM     05/07/2025 09:05 AM    K 4.1 05/07/2025 09:05 AM     05/07/2025 09:05 AM    CO2 27 05/07/2025 09:05 AM           Lipids  (most recent labs)   Lab Results   Component Value Date/Time    CHOLEST 182 05/07/2025 09:05 AM    TRIG 130 05/07/2025 09:05 AM    HDL 49 05/07/2025 09:05 AM     (H) 05/07/2025 09:05 AM    NONHDLC 133 (H) 05/07/2025 09:05 AM          Diabetes  (most recent labs)   Lab Results   Component Value Date/Time    A1C 6.7 (A) 05/13/2025 07:33 AM          Microalb (most recent labs)   Lab Results   Component Value Date/Time    MICROALBUMIN 3.1 07/18/2024 12:12 PM    MICROALBCREA 30.5 (H) 01/28/2025 10:09 AM        Lab results reviewed with patient.    REVIEW OF SYSTEMS:   GENERAL HEALTH: feels well  otherwise  SKIN: denies any unusual skin lesions or rashes  RESPIRATORY: denies shortness of breath with exertion  CARDIOVASCULAR: denies chest pain on exertion  GI:  denies nausea, abdominal pain or heartburn  NEURO: denies headaches and denies numbness and tingling to extremities  ENDO: denies polydipsia, polyuria or polyphagia, denies unexplained weight changes    EXAM:   /66   Pulse 70   Resp 18   Wt 176 lb (79.8 kg)   BMI 31.18 kg/m²  Estimated body mass index is 31.18 kg/m² as calculated from the following:    Height as of 5/9/25: 5' 3\" (1.6 m).    Weight as of this encounter: 176 lb (79.8 kg).   Physical Exam  Vitals reviewed.   Constitutional:       Appearance: Normal appearance.   Pulmonary:      Effort: Pulmonary effort is normal.   Neurological:      Mental Status: He is alert and oriented to person, place, and time.   Psychiatric:         Mood and Affect: Mood normal.         Behavior: Behavior normal.         ASSESSMENT AND PLAN:   As for his Diabetes, it is well controlled, stable.     Recommendations are: continue present meds, lose weight by increased dietary compliance and exercise, and check feet daily.    Patient to continue Ozempic 2mg injection weekly, Jardiance 25mg po daily and Lyumjev sliding scale: 200-250 mg/dl = 2 units, 251-300 mg/dl = 4 units, 301-350 mg/dl = 6 units, > 350 mg/dl = 8 units tid w/meals.  Change Toujeo to Tresiba U200 54 units injection once daily as requested by patient.    Medication instructions provided to patient via AVS for upcoming colonoscopy/endoscopy.    Per ADA guidelines, in patients with type 2 diabetes and established ASCVD, incorporating agent proven to reduce major adverse CV events and CV mortality   GLP-1 Ozempic rx chosen since it not only lowers A1C, but studies have shown it can also reduce the risk of major CV events such as heart attack, stroke, or death in adults with type 2 diabetes who are currently treating their CV disease.  Per FDA  indications, Jardiance is shown to reduce the risk of cardiovascular death in adults with type 2 diabetes and established cardiovascular disease, and to reduce the risk of death and hospitalization in patients with heart failure and low ejection fraction when added to standard of care therapies for diabetes and patients with known atherosclerotic cardiovascular disease.  Patient to continue to use personal Dexcom G7 CGM for glucose monitoring.  Reinforced double checking sensor readings with fingerstick glucose readings if readings seem inaccurate.  If having discrepancies over 20% despite calibration change sensor and contact Dexcom for replacement.  Discussed self monitoring blood glucose and the importance of monitoring.  Patient agreed to monitoring qid - before meals and 2 hours postprandial of one meal per day if not using CGM.  Hypoglycemia S&S, Rx, and when to call APRN/CDE reviewed using Rule of 15's. Stressed need to call if 2 readings below 80 mg/dl in 1 week for medication adjustment.   Reinforced healthy eating in healthy portions and increasing daily physical activity.  Reviewed ways to improve the protein in the urine:   Keep blood sugars in target range   Keep blood pressure in target range   Watch over the counter medicines like NSAID (non steroidal anti-inflammatory drugs) these can be harmful to  kidneys (examples include: , Motrin , Advil, ibuprofen, naprosyn, Aleve)   Continue SGLT2 therapy - renoprotective           As for his hypertension, Blood Pressure is well controlled, stable.   PLAN: reviewed diet, exercise and weight control         As for his cholesterol, Lipids are improved, no significant medication side effects noted, needs further observation, needs improvement, needs to follow diet more regularly.   PLAN: will continue present medications, reviewed diet, exercise and weight control, continue statin therapy.  Reinforced importance of consistency with taking medication.          DM  Health Maintenance  Last dilated eye exam: Last Dilated Eye Exam: 24     Exam shows retinopathy? Eye Exam shows Diabetic Retinopathy?: No      Last diabetic foot exam: Last Foot Exam: 25      Date of last PHQ-2 depression screen: PHQ-2 - Date of last depression screenin2025      Patient  reports that he has never smoked. He has never used smokeless tobacco.  When is flu vaccine due? No recommendations at this time  When is pneumonia vaccine due? No recommendations at this time    The patient indicates understanding of these issues and agrees to the plan.  Refills addressed at time of office visit.    Diagnoses and all orders for this visit:    Type 2 diabetes mellitus without complication, with long-term current use of insulin ([facility])  -     POC Hemoglobin A1C  -     insulin degludec (TRESIBA FLEXTOUCH) 200 UNIT/ML Subcutaneous Solution Pen-injector; Inject daily as directed up to TDD = 60 units    Essential hypertension    Mixed hyperlipidemia               Return in about 3 months (around 2025) for 45 minute appointment, Personal CGM.    The risks and benefits of my recommendations, as well as other treatment options were discussed with the patient today. Questions were answered to the best of my knowledge.   35 min spent with patient and >50% time spent counseling and coordinating care related to their office visit.      Nani JOHNSON, BC-ADM, CDCES

## 2025-05-13 NOTE — PATIENT INSTRUCTIONS
We are here to support you with Diabetes but please remember that you still need your primary care doctor for your routine health maintenance.   Your current A1C: 6.7%  This test provides us with your average blood sugar for the past 3 months.   The main goal of diabetes treatment is to keep your sugar from going too high. We measure your overall blood sugar trends with a Hemoglobin A1C test. (also called an A1C)  For most people the target is less than 7.0% but sometimes we make exceptions based on age, health history and other factors.   Keeping an A1C less than 7% helps prevents diabetes related health problems.   If your A1C goes too high, then we need to talk about changing your current diabetes treatment.    MEDICATIONS:   It is important to take all of your medications as prescribed.   Please call me if you cannot get the prescriptions filled or are having issues with refills.   Also, please call me if you have any issues with medication questions, side effects, dosing questions or problems with your blood sugar trends BEFORE CHANGING OR STOPPING ANY MEDICATIONS.   Continue Ozempic 2mg injection once weekly  Continue Jardiance 25mg daily - drink 4-6 eight ounce glasses of water daily to avoid dehydration  Continue Lyumjev sliding scale: 200-250 mg/dl = 2 units, 251-300 mg/dl = 4 units, 301-350 mg/dl = 6 units, > 350 mg/dl = 8 units 3x/day w/meals  Change Toujeo to Tresiba 54 units injection once daily    Colonoscopy/Endoscopy Instructions:  Hold Ozempic dose one week prior to procedure  Hold Jardiance 3 days prior to procedure  Hold Lyumjev morning of procedure  Decrease Toujeo or Tresiba dose to 43 units starting 2 days prior to procedure  *May resume medications and normal dosing following procedure    Blood sugar testing:   Always bring your glucose meter or blood sugar logbook to every appointment here at the diabetes center.  This allows me to safely make adjustments to your diabetes plan.   In order for  me to determine any patterns in your blood sugars, you will need to continue to use personal Dexcom CGM or test your blood sugar 4 times daily   Call if 2 readings below 80 mg/dl in 1 week for medication adjustment.       Blood sugar targets:  Before breakfast:   (preferably < 110)  2 hours After meals: less than 180 (preferably less than 150)   Call for persistent blood sugars < 75 or > 200.   Blood sugars greater than 200 are not acceptable to reach your goal of improving diabetes      Health Maintenance:   1. LABS: It is important to monitor your kidney function (blood and urine protein levels) , liver function tests and cholesterol levels when you have diabetes.     2. FOOT EXAMS:  daily foot inspections for foot wounds or skin changes are important for foot care. Any unusual changes should be reported immediately.    3. EYE EXAMS: Checking your eyes for diabetes changes is important and you should have a dilated eye exam done by an eye doctor EVERY year since these changes occur in the BACK of the eye and not visible by you.  Please let me know if you need provider list for eye doctor.     Lifestyle Therapy:    1. NUTRITION: Maintain optimal weight, calorie restriction if overweight, plant-based diet    2. PHYSICAL ACTIVITY: 150 minutes per week (30 minutes a day 5 days a week) of moderate exertion such as walking, stair climbing.  Include strength training 2-3 times per week.  Increase as tolerated.    3. SLEEP: Try and get 7-8 hours of sleep per night    4. BEHAVIOR:  Tobacco cessation and alcohol in moderation      Reminders:  Diabetes eye exam due in August  Tresiba copay card available from [employer]    Nani JOHNSON, BC-[facility], [facility]  [address] [place] [address]  [address] [place] [address]  [address] [place] [address]  Diabetes Services at [facility] [phone]  F [phone]

## 2025-05-28 PROBLEM — K21.9 GASTROESOPHAGEAL REFLUX DISEASE: Status: ACTIVE | Noted: 2025-05-28

## 2025-05-28 PROBLEM — Z86.0101 HISTORY OF ADENOMATOUS POLYP OF COLON: Status: ACTIVE | Noted: 2025-05-28

## 2025-05-28 PROBLEM — D12.3 BENIGN NEOPLASM OF TRANSVERSE COLON: Status: ACTIVE | Noted: 2025-05-28

## 2025-05-28 PROBLEM — R19.4 CHANGE IN BOWEL HABITS: Status: ACTIVE | Noted: 2025-05-28

## 2025-05-28 PROBLEM — D12.0 BENIGN NEOPLASM OF CECUM: Status: ACTIVE | Noted: 2025-05-28

## 2025-05-28 PROBLEM — R14.0 ABDOMINAL BLOATING: Status: ACTIVE | Noted: 2025-05-28

## 2025-05-28 PROBLEM — D12.4 BENIGN NEOPLASM OF DESCENDING COLON: Status: ACTIVE | Noted: 2025-05-28

## 2025-05-28 PROBLEM — D12.5 BENIGN NEOPLASM OF SIGMOID COLON: Status: ACTIVE | Noted: 2025-05-28

## 2025-07-29 ENCOUNTER — PATIENT MESSAGE (OUTPATIENT)
Dept: ENDOCRINOLOGY CLINIC | Facility: CLINIC | Age: 60
End: 2025-07-29

## 2025-07-29 DIAGNOSIS — E11.9 TYPE 2 DIABETES MELLITUS WITHOUT COMPLICATION, WITH LONG-TERM CURRENT USE OF INSULIN (HCC): ICD-10-CM

## 2025-07-29 DIAGNOSIS — Z79.4 TYPE 2 DIABETES MELLITUS WITHOUT COMPLICATION, WITH LONG-TERM CURRENT USE OF INSULIN (HCC): ICD-10-CM

## 2025-07-29 RX ORDER — SEMAGLUTIDE 2.68 MG/ML
2 INJECTION, SOLUTION SUBCUTANEOUS WEEKLY
Qty: 3 ML | Refills: 3 | Status: SHIPPED | OUTPATIENT
Start: 2025-07-29

## 2025-07-30 ENCOUNTER — TELEPHONE (OUTPATIENT)
Facility: CLINIC | Age: 60
End: 2025-07-30

## 2025-08-06 DIAGNOSIS — E78.2 MIXED HYPERLIPIDEMIA: ICD-10-CM

## 2025-08-07 DIAGNOSIS — E11.9 TYPE 2 DIABETES MELLITUS WITHOUT COMPLICATION, WITH LONG-TERM CURRENT USE OF INSULIN (HCC): ICD-10-CM

## 2025-08-07 DIAGNOSIS — Z79.4 TYPE 2 DIABETES MELLITUS WITHOUT COMPLICATION, WITH LONG-TERM CURRENT USE OF INSULIN (HCC): ICD-10-CM

## 2025-08-07 RX ORDER — ROSUVASTATIN CALCIUM 20 MG/1
20 TABLET, COATED ORAL NIGHTLY
Qty: 90 TABLET | Refills: 1 | Status: SHIPPED | OUTPATIENT
Start: 2025-08-07

## 2025-08-07 RX ORDER — ACYCLOVIR 400 MG/1
TABLET ORAL
Qty: 3 EACH | Refills: 5 | Status: SHIPPED | OUTPATIENT
Start: 2025-08-07

## 2025-08-26 ENCOUNTER — TELEPHONE (OUTPATIENT)
Dept: ENDOCRINOLOGY CLINIC | Facility: CLINIC | Age: 60
End: 2025-08-26

## (undated) DIAGNOSIS — Z79.4 TYPE 2 DIABETES MELLITUS WITH MICROALBUMINURIA, WITH LONG-TERM CURRENT USE OF INSULIN (HCC): Primary | ICD-10-CM

## (undated) DIAGNOSIS — E11.29 TYPE 2 DIABETES MELLITUS WITH MICROALBUMINURIA, WITH LONG-TERM CURRENT USE OF INSULIN (HCC): Primary | ICD-10-CM

## (undated) DIAGNOSIS — R80.9 TYPE 2 DIABETES MELLITUS WITH MICROALBUMINURIA, WITH LONG-TERM CURRENT USE OF INSULIN (HCC): Primary | ICD-10-CM

## (undated) DIAGNOSIS — M75.51 SUBACROMIAL BURSITIS OF RIGHT SHOULDER JOINT: Primary | ICD-10-CM

## (undated) DIAGNOSIS — M67.911 ROTATOR CUFF DYSFUNCTION, RIGHT: ICD-10-CM

## (undated) DEVICE — ZIPWIRE GUIDEWIRE .035X150 STR

## (undated) DEVICE — CAUTERY PENCIL

## (undated) DEVICE — NEEDLE CONTRAST INTERJECT 25G

## (undated) DEVICE — SNARE CAPTIFLEX MICRO-OVL OLY

## (undated) DEVICE — SUTURE MONOCRYL 4-0 PS-2

## (undated) DEVICE — SCD SLEEVE KNEE HI BLEND

## (undated) DEVICE — TRAP 4 CPTR CHMBR N EZ INLN

## (undated) DEVICE — TROCAR: Brand: KII® SLEEVE

## (undated) DEVICE — TROCAR: Brand: KII FIOS FIRST ENTRY

## (undated) DEVICE — CLIP LGT 11MM OPEN 2.8MM 235CM

## (undated) DEVICE — Device

## (undated) DEVICE — REM POLYHESIVE ADULT PATIENT RETURN ELECTRODE: Brand: VALLEYLAB

## (undated) DEVICE — DRAPE HALF 40X58 DYNJP2410

## (undated) DEVICE — STERILE POLYISOPRENE POWDER-FREE SURGICAL GLOVES: Brand: PROTEXIS

## (undated) DEVICE — 3M™ RED DOT™ MONITORING ELECTRODE WITH FOAM TAPE AND STICKY GEL, 50/BAG, 20/CASE, 72/PLT 2570: Brand: RED DOT™

## (undated) DEVICE — Device: Brand: DEFENDO AIR/WATER/SUCTION AND BIOPSY VALVE

## (undated) DEVICE — DISPOSABLE LAPAROSCOPIC CLIP APPLIER WITH 20 CLIPS.: Brand: EPIX® UNIVERSAL CLIP APPLIER

## (undated) DEVICE — C-ARM: Brand: UNBRANDED

## (undated) DEVICE — CHLORAPREP 26ML APPLICATOR

## (undated) DEVICE — FILTERLINE NASAL ADULT O2/CO2

## (undated) DEVICE — ENDOSCOPY PACK - LOWER: Brand: MEDLINE INDUSTRIES, INC.

## (undated) DEVICE — ORISE GEL

## (undated) DEVICE — LAP CHOLE/APPY CDS-LF: Brand: MEDLINE INDUSTRIES, INC.

## (undated) DEVICE — FORCEP BIOPSY RJ4 LG CAP W/ND

## (undated) DEVICE — VIOLET BRAIDED (POLYGLACTIN 910), SYNTHETIC ABSORBABLE SUTURE: Brand: COATED VICRYL

## (undated) DEVICE — LIGHT HANDLE

## (undated) DEVICE — NET SPECIMEN RETRIEVAL BLUE

## (undated) DEVICE — TIGERTAIL 5F FLXTIP 70CM

## (undated) DEVICE — TROCARS: Brand: KII® BALLOON BLUNT TIP SYSTEM

## (undated) DEVICE — 1200CC GUARDIAN II: Brand: GUARDIAN

## (undated) DEVICE — STERILE SYNTHETIC POLYISOPRENE POWDER-FREE SURGICAL GLOVES WITH HYDROGEL COATING: Brand: PROTEXIS

## (undated) DEVICE — 40580 - THE PINK PAD - ADVANCED TRENDELENBURG POSITIONING KIT: Brand: 40580 - THE PINK PAD - ADVANCED TRENDELENBURG POSITIONING KIT

## (undated) NOTE — LETTER
[facility]  Phone: [phone]  Right Fax: [phone]  EVALUATION REQUEST PREOP    Sent By:  Pauline Crockett RN Date: 12/18/17    Patient Name: Marta Jade  Surgery Date: 1/8/2018    CSN: [id number]  Medical Record: [id number]

## (undated) NOTE — MR AVS SNAPSHOT
[address]  [address] [facility], [facility] [address] [place] [id number] [phone]               Thank you for choosing us for your health care visit with Wilmer Juárez DO.   We are glad to serve you and happy to provide you with this requirements for authorization, please wait 5-7 days and then contact your physician's office. At that time, you will be provided with any authorization numbers or be assured that none are required. You can then schedule your appointment.  Failure to obtain Magnesium 100 MG Tabs   Take  by mouth. MetFORMIN HCl  MG Tb24   1 tab bid   Commonly known as:  GLUCOPHAGE-XR           simvastatin 10 MG Tabs   Take 1 tablet (10 mg total) by mouth once daily.    Commonly known as:  ZOCOR

## (undated) NOTE — LETTER
06/19/20        [address]  [address] [facility] [address]      Dear Igor Shock records indicate that you have outstanding lab work and or testing that was ordered for you and has not yet been completed:  Orders Placed This Encounter      C

## (undated) NOTE — LETTER
[facility] Department  Phone: [phone]  OUTSIDE TESTING RESULT REQUEST       TO:   Dr. Jeffry Panchal Today's Date: 4/23/21    FAX #:  [phone]     IMPORTANT: FOR YOUR IMMEDIATE ATTENTION  Please FAX all test results l

## (undated) NOTE — LETTER
[facility]  Phone: [phone]  OUTSIDE TESTING RESULT REQUEST      TO:   Dr. Asya Donahue Today's Date: 4/23/21    FAX #: [phone]     IMPORTANT: FOR YOUR IMMEDIATE ATTENTION  Please FAX all test results listed

## (undated) NOTE — ED AVS SNAPSHOT
Chris Robb   MRN: [id number]    Department:  [facility] Emergency Department   Date of Visit:  11/28/2019           Disclosure     Insurance plans vary and the physician(s) referred by the ER may not be covered by your plan.  Please contact you tell this physician (or your personal doctor if your instructions are to return to your personal doctor) about any new or lasting problems. The primary care or specialist physician will see patients referred from the [facility] Emergency Department.  Joann Enriquez

## (undated) NOTE — LETTER
Kiley Irizarry D.O. Surgical Clearance Needed    Date: 4/21/2021                                                                       From: MADI Lewis: DR. Alferd Snellen

## (undated) NOTE — LETTER
21    Patient: Jaskaran Ornelas  : 1965 Visit date: 2021    Dear  Raciel Doan MD    Thank you for referring Faxon Ceci to my practice. Please find my assessment and plan below.       Assessment   Recurrent biliary colic with chronic

## (undated) NOTE — MR AVS SNAPSHOT
[address]  [address] [address] [phone]               Thank you for choosing us for your health care visit with Laure Sahu DO.   We are glad to serve you and happy to provide you with this Commonly known as:  FREESTYLE INSULINX TEST           insulin aspart 100 UNIT/ML Sopn   INJECT 1 UNIT FOR EVERY 15GM OF CARBS THREE TIMES DAILY WITH MEALS AND ADD 1 UNIT FOR EVERY 40MG/DL OF BLOOD SUGAR ABOVE 140M/DL   Commonly known as:  Evalene Blizzard Summaries. If you've been to the Emergency Department or your doctor's office, you can view your past visit information in [employer] by going to Visits < Visit Summaries. [employer] questions? Call [phone] for help.   [employer] is NOT to be used for urge

## (undated) NOTE — LETTER
01/08/18        [address] [address]      Dear Igor Shock records indicate that you have outstanding lab work and or testing that was ordered for you and has not yet been completed:          Comp Metabolic Panel (14)